# Patient Record
Sex: FEMALE | Race: WHITE | NOT HISPANIC OR LATINO | Employment: FULL TIME | URBAN - METROPOLITAN AREA
[De-identification: names, ages, dates, MRNs, and addresses within clinical notes are randomized per-mention and may not be internally consistent; named-entity substitution may affect disease eponyms.]

---

## 2017-10-25 ENCOUNTER — OFFICE VISIT (OUTPATIENT)
Dept: OBSTETRICS AND GYNECOLOGY | Facility: CLINIC | Age: 57
End: 2017-10-25

## 2017-10-25 VITALS
HEIGHT: 65 IN | BODY MASS INDEX: 35.82 KG/M2 | WEIGHT: 215 LBS | SYSTOLIC BLOOD PRESSURE: 122 MMHG | DIASTOLIC BLOOD PRESSURE: 80 MMHG

## 2017-10-25 DIAGNOSIS — Z01.419 WELL WOMAN EXAM WITH ROUTINE GYNECOLOGICAL EXAM: Primary | ICD-10-CM

## 2017-10-25 PROCEDURE — 99396 PREV VISIT EST AGE 40-64: CPT | Performed by: OBSTETRICS & GYNECOLOGY

## 2017-10-25 RX ORDER — CANAGLIFLOZIN 300 MG/1
TABLET, FILM COATED ORAL
Refills: 1 | COMMUNITY
Start: 2017-08-23

## 2017-10-25 RX ORDER — DULAGLUTIDE 0.75 MG/.5ML
INJECTION, SOLUTION SUBCUTANEOUS
Refills: 0 | COMMUNITY
Start: 2017-10-20 | End: 2022-01-05

## 2017-10-25 RX ORDER — METFORMIN HYDROCHLORIDE 500 MG/1
TABLET, EXTENDED RELEASE ORAL
Refills: 3 | COMMUNITY
Start: 2017-10-20

## 2017-10-25 RX ORDER — METOPROLOL SUCCINATE 25 MG/1
TABLET, EXTENDED RELEASE ORAL
Refills: 1 | COMMUNITY
Start: 2017-09-20

## 2017-10-25 RX ORDER — PRAVASTATIN SODIUM 40 MG
TABLET ORAL
Refills: 1 | COMMUNITY
Start: 2017-10-20

## 2017-10-25 RX ORDER — LORAZEPAM 1 MG/1
TABLET ORAL
Refills: 5 | COMMUNITY
Start: 2017-10-14 | End: 2022-01-05

## 2017-10-25 RX ORDER — ESOMEPRAZOLE MAGNESIUM 40 MG/1
CAPSULE, DELAYED RELEASE ORAL
Refills: 2 | COMMUNITY
Start: 2017-10-14

## 2017-10-25 RX ORDER — ROPINIROLE 1 MG/1
1 TABLET, FILM COATED ORAL NIGHTLY
COMMUNITY
End: 2022-01-05

## 2017-10-25 RX ORDER — VENLAFAXINE HYDROCHLORIDE 75 MG/1
CAPSULE, EXTENDED RELEASE ORAL
Refills: 1 | COMMUNITY
Start: 2017-10-14

## 2017-10-25 NOTE — PROGRESS NOTES
Subjective:    Patient Colleen Yanes is a 56 y.o. female.   Chief Complaint   Patient presents with   • Gynecologic Exam     MX TODAY. DX UP TO DATE. C-SCOPY DUE. NON SMOKER. NO HYST.      Patient's postmenopausal having no hormone symptoms here for her anal exam    HPI      The following portions of the patient's history were reviewed and updated as appropriate: allergies, current medications, past family history, past medical history, past social history, past surgical history and problem list.      Review of Systems   Constitutional: Negative.    HENT: Negative.    Eyes: Negative.    Respiratory: Negative.    Cardiovascular: Negative.    Gastrointestinal: Negative for abdominal distention, abdominal pain, anal bleeding, blood in stool, constipation, diarrhea, nausea, rectal pain and vomiting.   Endocrine: Negative for cold intolerance, heat intolerance, polydipsia, polyphagia and polyuria.   Genitourinary: Negative.  Negative for decreased urine volume, dyspareunia, dysuria, enuresis, flank pain, frequency, genital sores, hematuria, menstrual problem, pelvic pain, urgency, vaginal bleeding, vaginal discharge and vaginal pain.   Musculoskeletal: Negative.    Skin: Negative.    Allergic/Immunologic: Negative.    Neurological: Negative.    Hematological: Negative for adenopathy. Does not bruise/bleed easily.   Psychiatric/Behavioral: Negative for agitation, confusion and sleep disturbance. The patient is not nervous/anxious.          Objective:      Physical Exam   Constitutional: She appears well-developed and well-nourished. She is not intubated.   HENT:   Head: Hair is normal.   Nose: Nose normal.   Mouth/Throat: Oropharynx is clear and moist.   Eyes: Conjunctivae are normal.   Neck: Normal carotid pulses and no JVD present. No tracheal tenderness, no spinous process tenderness and no muscular tenderness present. Carotid bruit is not present. No rigidity. No edema, no erythema and normal range of motion present.  No thyroid mass and no thyromegaly present.   Cardiovascular: Normal rate, regular rhythm, S1 normal and normal heart sounds.  Exam reveals no gallop.    No murmur heard.  Pulmonary/Chest: Effort normal. No accessory muscle usage or stridor. No apnea, no tachypnea and no bradypnea. She is not intubated. No respiratory distress. She has no wheezes. She has no rales. She exhibits no tenderness. Right breast exhibits no inverted nipple, no mass, no nipple discharge, no skin change and no tenderness. Left breast exhibits no inverted nipple, no mass, no nipple discharge, no skin change and no tenderness.   Abdominal: Soft. Bowel sounds are normal. She exhibits no distension and no mass. There is no tenderness. There is no rebound and no guarding. No hernia.   Genitourinary: Vagina normal and uterus normal. Rectal exam shows no external hemorrhoid, no internal hemorrhoid, no fissure, no mass, no tenderness and anal tone normal. There is no rash, tenderness, lesion or injury on the right labia. There is no rash, tenderness, lesion or injury on the left labia. Uterus is not deviated, not enlarged, not fixed and not tender. Cervix exhibits no motion tenderness, no discharge and no friability. Right adnexum displays no mass and no tenderness. Left adnexum displays no mass and no tenderness. No erythema, tenderness or bleeding in the vagina. No foreign body in the vagina. No signs of injury around the vagina. No vaginal discharge found.   Musculoskeletal: She exhibits no edema or tenderness.        Right shoulder: She exhibits no tenderness, no swelling, no pain and no spasm.   Lymphadenopathy:        Head (right side): No submental, no submandibular, no tonsillar, no preauricular, no posterior auricular and no occipital adenopathy present.        Head (left side): No submental, no submandibular, no tonsillar, no preauricular, no posterior auricular and no occipital adenopathy present.     She has no cervical adenopathy.         Right cervical: No superficial cervical, no deep cervical and no posterior cervical adenopathy present.       Left cervical: No superficial cervical, no deep cervical and no posterior cervical adenopathy present.        Right axillary: No pectoral and no lateral adenopathy present.        Left axillary: No pectoral and no lateral adenopathy present.       Right: No inguinal, no supraclavicular and no epitrochlear adenopathy present.        Left: No inguinal, no supraclavicular and no epitrochlear adenopathy present.   Neurological: No cranial nerve deficit. Coordination normal.   Skin: Skin is warm and dry. No abrasion, no bruising, no burn, no lesion, no petechiae, no purpura and no rash noted. Rash is not macular, not maculopapular, not nodular and not urticarial. No cyanosis or erythema. No pallor. Nails show no clubbing.   Psychiatric: She has a normal mood and affect. Her behavior is normal.         Assessment and Plan: Patient's having no major issues she does have the adult onset diabetes and started on trulucidity    Patient has been instructed to perform a self breast exam on a weekly basis, a yearly mammogram, pap smear yearly unless instructed otherwise and bone density every 2 years.  I recommended that the patient not smoke, and discussed smoking cessation when appropriate.     Colleen was seen today for gynecologic exam.    Diagnoses and all orders for this visit:    Well woman exam with routine gynecological exam  -     IGP,rfx Aptima HPV All Pth - ThinPrep Vial, Cervix

## 2017-10-27 LAB
CONV .: NORMAL
CYTOLOGIST CVX/VAG CYTO: NORMAL
CYTOLOGY CVX/VAG DOC THIN PREP: NORMAL
DX ICD CODE: NORMAL
HIV 1 & 2 AB SER-IMP: NORMAL
OTHER STN SPEC: NORMAL
PATH REPORT.FINAL DX SPEC: NORMAL
STAT OF ADQ CVX/VAG CYTO-IMP: NORMAL

## 2018-02-26 ENCOUNTER — OFFICE (OUTPATIENT)
Dept: URBAN - METROPOLITAN AREA PATHOLOGY 4 | Facility: PATHOLOGY | Age: 58
End: 2018-02-26

## 2018-02-26 ENCOUNTER — AMBULATORY SURGICAL CENTER (OUTPATIENT)
Dept: URBAN - METROPOLITAN AREA SURGERY 17 | Facility: SURGERY | Age: 58
End: 2018-02-26

## 2018-02-26 VITALS
HEART RATE: 78 BPM | HEART RATE: 75 BPM | OXYGEN SATURATION: 97 % | HEART RATE: 86 BPM | OXYGEN SATURATION: 95 % | OXYGEN SATURATION: 100 % | OXYGEN SATURATION: 94 % | SYSTOLIC BLOOD PRESSURE: 142 MMHG | OXYGEN SATURATION: 91 % | DIASTOLIC BLOOD PRESSURE: 54 MMHG | SYSTOLIC BLOOD PRESSURE: 147 MMHG | OXYGEN SATURATION: 96 % | SYSTOLIC BLOOD PRESSURE: 120 MMHG | RESPIRATION RATE: 20 BRPM | SYSTOLIC BLOOD PRESSURE: 123 MMHG | DIASTOLIC BLOOD PRESSURE: 71 MMHG | DIASTOLIC BLOOD PRESSURE: 85 MMHG | RESPIRATION RATE: 21 BRPM | HEART RATE: 87 BPM | HEART RATE: 79 BPM | SYSTOLIC BLOOD PRESSURE: 146 MMHG | TEMPERATURE: 99 F | SYSTOLIC BLOOD PRESSURE: 104 MMHG | DIASTOLIC BLOOD PRESSURE: 84 MMHG | HEART RATE: 85 BPM | OXYGEN SATURATION: 93 % | OXYGEN SATURATION: 92 % | HEIGHT: 64 IN | SYSTOLIC BLOOD PRESSURE: 158 MMHG | WEIGHT: 208 LBS | HEART RATE: 104 BPM | DIASTOLIC BLOOD PRESSURE: 69 MMHG | DIASTOLIC BLOOD PRESSURE: 89 MMHG | SYSTOLIC BLOOD PRESSURE: 107 MMHG | DIASTOLIC BLOOD PRESSURE: 75 MMHG | DIASTOLIC BLOOD PRESSURE: 66 MMHG | HEART RATE: 76 BPM | SYSTOLIC BLOOD PRESSURE: 124 MMHG | RESPIRATION RATE: 14 BRPM | DIASTOLIC BLOOD PRESSURE: 53 MMHG | HEART RATE: 81 BPM | TEMPERATURE: 98.5 F | SYSTOLIC BLOOD PRESSURE: 131 MMHG | OXYGEN SATURATION: 98 % | DIASTOLIC BLOOD PRESSURE: 58 MMHG | DIASTOLIC BLOOD PRESSURE: 76 MMHG | HEART RATE: 101 BPM | OXYGEN SATURATION: 99 % | DIASTOLIC BLOOD PRESSURE: 73 MMHG | HEART RATE: 88 BPM | SYSTOLIC BLOOD PRESSURE: 117 MMHG | RESPIRATION RATE: 16 BRPM | SYSTOLIC BLOOD PRESSURE: 127 MMHG

## 2018-02-26 DIAGNOSIS — D12.5 BENIGN NEOPLASM OF SIGMOID COLON: ICD-10-CM

## 2018-02-26 DIAGNOSIS — Z12.11 ENCOUNTER FOR SCREENING FOR MALIGNANT NEOPLASM OF COLON: ICD-10-CM

## 2018-02-26 DIAGNOSIS — K63.5 POLYP OF COLON: ICD-10-CM

## 2018-02-26 DIAGNOSIS — K62.89 OTHER SPECIFIED DISEASES OF ANUS AND RECTUM: ICD-10-CM

## 2018-02-26 LAB
GI HISTOLOGY: A. UNSPECIFIED: (no result)
GI HISTOLOGY: PDF REPORT: (no result)

## 2018-02-26 PROCEDURE — 45380 COLONOSCOPY AND BIOPSY: CPT | Mod: 33 | Performed by: INTERNAL MEDICINE

## 2018-02-26 PROCEDURE — 88305 TISSUE EXAM BY PATHOLOGIST: CPT | Mod: 33 | Performed by: INTERNAL MEDICINE

## 2018-02-26 RX ORDER — LINACLOTIDE 145 UG/1
145 CAPSULE, GELATIN COATED ORAL
Qty: 30 | Refills: 11 | Status: COMPLETED
Start: 2018-02-26 | End: 2023-03-09

## 2018-02-26 RX ADMIN — PROPOFOL 50 MG: 10 INJECTION, EMULSION INTRAVENOUS at 12:38

## 2018-02-26 RX ADMIN — PROPOFOL 25 MG: 10 INJECTION, EMULSION INTRAVENOUS at 12:48

## 2018-02-26 RX ADMIN — PROPOFOL 50 MG: 10 INJECTION, EMULSION INTRAVENOUS at 12:51

## 2018-02-26 RX ADMIN — PROPOFOL 50 MG: 10 INJECTION, EMULSION INTRAVENOUS at 12:41

## 2018-02-26 RX ADMIN — PROPOFOL 50 MG: 10 INJECTION, EMULSION INTRAVENOUS at 12:40

## 2018-02-26 RX ADMIN — PROPOFOL 100 MG: 10 INJECTION, EMULSION INTRAVENOUS at 12:44

## 2018-02-26 RX ADMIN — PROPOFOL 25 MG: 10 INJECTION, EMULSION INTRAVENOUS at 12:52

## 2018-02-26 RX ADMIN — PROPOFOL 50 MG: 10 INJECTION, EMULSION INTRAVENOUS at 13:00

## 2018-02-26 RX ADMIN — PROPOFOL 50 MG: 10 INJECTION, EMULSION INTRAVENOUS at 12:56

## 2018-02-26 RX ADMIN — PROPOFOL 50 MG: 10 INJECTION, EMULSION INTRAVENOUS at 12:36

## 2018-02-26 RX ADMIN — PROPOFOL 50 MG: 10 INJECTION, EMULSION INTRAVENOUS at 13:10

## 2018-02-26 RX ADMIN — LIDOCAINE HYDROCHLORIDE 25 MG: 10 INJECTION, SOLUTION EPIDURAL; INFILTRATION; INTRACAUDAL; PERINEURAL at 12:35

## 2018-02-26 RX ADMIN — PROPOFOL 50 MG: 10 INJECTION, EMULSION INTRAVENOUS at 13:05

## 2018-02-26 RX ADMIN — PROPOFOL 100 MG: 10 INJECTION, EMULSION INTRAVENOUS at 12:35

## 2018-03-21 VITALS
DIASTOLIC BLOOD PRESSURE: 59 MMHG | RESPIRATION RATE: 16 BRPM | OXYGEN SATURATION: 94 % | SYSTOLIC BLOOD PRESSURE: 124 MMHG | RESPIRATION RATE: 19 BRPM | SYSTOLIC BLOOD PRESSURE: 129 MMHG | DIASTOLIC BLOOD PRESSURE: 69 MMHG | DIASTOLIC BLOOD PRESSURE: 80 MMHG | HEART RATE: 74 BPM | DIASTOLIC BLOOD PRESSURE: 76 MMHG | SYSTOLIC BLOOD PRESSURE: 116 MMHG | SYSTOLIC BLOOD PRESSURE: 136 MMHG | TEMPERATURE: 98.4 F | OXYGEN SATURATION: 91 % | DIASTOLIC BLOOD PRESSURE: 66 MMHG | OXYGEN SATURATION: 99 % | DIASTOLIC BLOOD PRESSURE: 90 MMHG | SYSTOLIC BLOOD PRESSURE: 128 MMHG | HEART RATE: 83 BPM | RESPIRATION RATE: 17 BRPM | RESPIRATION RATE: 18 BRPM | HEART RATE: 94 BPM | HEIGHT: 64 IN | HEART RATE: 80 BPM | SYSTOLIC BLOOD PRESSURE: 141 MMHG | OXYGEN SATURATION: 95 % | TEMPERATURE: 98.6 F | HEART RATE: 76 BPM | HEART RATE: 96 BPM | RESPIRATION RATE: 14 BRPM | SYSTOLIC BLOOD PRESSURE: 153 MMHG | WEIGHT: 208 LBS | DIASTOLIC BLOOD PRESSURE: 75 MMHG

## 2018-03-22 ENCOUNTER — OFFICE (OUTPATIENT)
Dept: URBAN - METROPOLITAN AREA PATHOLOGY 4 | Facility: PATHOLOGY | Age: 58
End: 2018-03-22

## 2018-03-22 ENCOUNTER — AMBULATORY SURGICAL CENTER (OUTPATIENT)
Dept: URBAN - METROPOLITAN AREA SURGERY 17 | Facility: SURGERY | Age: 58
End: 2018-03-22

## 2018-03-22 DIAGNOSIS — K31.7 POLYP OF STOMACH AND DUODENUM: ICD-10-CM

## 2018-03-22 DIAGNOSIS — K29.50 UNSPECIFIED CHRONIC GASTRITIS WITHOUT BLEEDING: ICD-10-CM

## 2018-03-22 DIAGNOSIS — K29.60 OTHER GASTRITIS WITHOUT BLEEDING: ICD-10-CM

## 2018-03-22 DIAGNOSIS — K21.9 GASTRO-ESOPHAGEAL REFLUX DISEASE WITHOUT ESOPHAGITIS: ICD-10-CM

## 2018-03-22 DIAGNOSIS — R13.10 DYSPHAGIA, UNSPECIFIED: ICD-10-CM

## 2018-03-22 DIAGNOSIS — K22.2 ESOPHAGEAL OBSTRUCTION: ICD-10-CM

## 2018-03-22 LAB
GI HISTOLOGY: A. UNSPECIFIED: (no result)
GI HISTOLOGY: PDF REPORT: (no result)

## 2018-03-22 PROCEDURE — 43239 EGD BIOPSY SINGLE/MULTIPLE: CPT | Performed by: INTERNAL MEDICINE

## 2018-03-22 PROCEDURE — 43450 DILATE ESOPHAGUS 1/MULT PASS: CPT | Performed by: INTERNAL MEDICINE

## 2018-03-22 PROCEDURE — 88305 TISSUE EXAM BY PATHOLOGIST: CPT | Performed by: INTERNAL MEDICINE

## 2018-03-22 RX ADMIN — PROPOFOL 100 MG: 10 INJECTION, EMULSION INTRAVENOUS at 11:34

## 2018-03-22 RX ADMIN — PROPOFOL 25 MG: 10 INJECTION, EMULSION INTRAVENOUS at 11:37

## 2018-03-22 RX ADMIN — PROPOFOL 75 MG: 10 INJECTION, EMULSION INTRAVENOUS at 11:37

## 2018-03-22 RX ADMIN — PROPOFOL 100 MG: 10 INJECTION, EMULSION INTRAVENOUS at 11:41

## 2019-01-22 DIAGNOSIS — Z12.31 VISIT FOR SCREENING MAMMOGRAM: Primary | ICD-10-CM

## 2019-01-23 ENCOUNTER — OFFICE VISIT (OUTPATIENT)
Dept: OBSTETRICS AND GYNECOLOGY | Facility: CLINIC | Age: 59
End: 2019-01-23

## 2019-01-23 VITALS
HEIGHT: 64 IN | SYSTOLIC BLOOD PRESSURE: 112 MMHG | DIASTOLIC BLOOD PRESSURE: 60 MMHG | BODY MASS INDEX: 34.15 KG/M2 | WEIGHT: 200 LBS

## 2019-01-23 DIAGNOSIS — Z01.419 ENCOUNTER FOR ANNUAL ROUTINE GYNECOLOGICAL EXAMINATION: Primary | ICD-10-CM

## 2019-01-23 PROCEDURE — 99396 PREV VISIT EST AGE 40-64: CPT | Performed by: OBSTETRICS & GYNECOLOGY

## 2019-01-23 RX ORDER — SEMAGLUTIDE 1.34 MG/ML
INJECTION, SOLUTION SUBCUTANEOUS
Refills: 3 | COMMUNITY
Start: 2018-12-02

## 2019-01-23 RX ORDER — PERPHENAZINE 16 MG
TABLET ORAL
COMMUNITY

## 2019-01-23 RX ORDER — LOSARTAN POTASSIUM 25 MG/1
25 TABLET ORAL DAILY
Refills: 1 | COMMUNITY
Start: 2018-12-05

## 2019-01-23 NOTE — PROGRESS NOTES
Subjective:    Patient Colleen Yanes is a 58 y.o. female.   Chief Complaint   Patient presents with   • Gynecologic Exam     AE, NO HYST, NON SMOKER     CC no issues annual exam yearly mammograms discussed patient had lichen sclerosis but the symptoms cleared completely    HPI      The following portions of the patient's history were reviewed and updated as appropriate: allergies, current medications, past family history, past medical history, past social history, past surgical history and problem list.      Review of Systems   Constitutional: Negative.    HENT: Negative.    Eyes: Negative.    Respiratory: Negative.    Cardiovascular: Negative.    Gastrointestinal: Negative for abdominal distention, abdominal pain, anal bleeding, blood in stool, constipation, diarrhea, nausea, rectal pain and vomiting.   Endocrine: Negative for cold intolerance, heat intolerance, polydipsia, polyphagia and polyuria.   Genitourinary: Negative.  Negative for decreased urine volume, dyspareunia, dysuria, enuresis, flank pain, frequency, genital sores, hematuria, menstrual problem, pelvic pain, urgency, vaginal bleeding, vaginal discharge and vaginal pain.   Musculoskeletal: Negative.    Skin: Negative.    Allergic/Immunologic: Negative.    Neurological: Negative.    Hematological: Negative for adenopathy. Does not bruise/bleed easily.   Psychiatric/Behavioral: Negative for agitation, confusion and sleep disturbance. The patient is not nervous/anxious.          Objective:      Physical Exam   Constitutional: She appears well-developed and well-nourished. She is not intubated.   HENT:   Head: Hair is normal.   Nose: Nose normal.   Mouth/Throat: Oropharynx is clear and moist.   Eyes: Conjunctivae are normal.   Neck: Normal carotid pulses and no JVD present. No tracheal tenderness, no spinous process tenderness and no muscular tenderness present. Carotid bruit is not present. No neck rigidity. No edema, no erythema and normal range of  motion present. No thyroid mass and no thyromegaly present.   Cardiovascular: Normal rate, regular rhythm, S1 normal and normal heart sounds. Exam reveals no gallop.   No murmur heard.  Pulmonary/Chest: Effort normal. No accessory muscle usage or stridor. No apnea, no tachypnea and no bradypnea. She is not intubated. No respiratory distress. She has no wheezes. She has no rales. She exhibits no tenderness. Right breast exhibits no inverted nipple, no mass, no nipple discharge, no skin change and no tenderness. Left breast exhibits no inverted nipple, no mass, no nipple discharge, no skin change and no tenderness.   Abdominal: Soft. Bowel sounds are normal. She exhibits no distension and no mass. There is no tenderness. There is no rebound and no guarding. No hernia.   Genitourinary: Vagina normal and uterus normal. Rectal exam shows no external hemorrhoid, no internal hemorrhoid, no fissure, no mass, no tenderness and anal tone normal. There is no rash, tenderness, lesion or injury on the right labia. There is no rash, tenderness, lesion or injury on the left labia. Uterus is not deviated, not enlarged, not fixed and not tender. Cervix exhibits no motion tenderness, no discharge and no friability. Right adnexum displays no mass and no tenderness. Left adnexum displays no mass and no tenderness. No erythema, tenderness or bleeding in the vagina. No foreign body in the vagina. No signs of injury around the vagina. No vaginal discharge found.   Musculoskeletal: She exhibits no edema or tenderness.        Right shoulder: She exhibits no tenderness, no swelling, no pain and no spasm.   Lymphadenopathy:        Head (right side): No submental, no submandibular, no tonsillar, no preauricular, no posterior auricular and no occipital adenopathy present.        Head (left side): No submental, no submandibular, no tonsillar, no preauricular, no posterior auricular and no occipital adenopathy present.     She has no cervical  adenopathy.        Right cervical: No superficial cervical, no deep cervical and no posterior cervical adenopathy present.       Left cervical: No superficial cervical, no deep cervical and no posterior cervical adenopathy present.        Right axillary: No pectoral and no lateral adenopathy present.        Left axillary: No pectoral and no lateral adenopathy present.       Right: No inguinal, no supraclavicular and no epitrochlear adenopathy present.        Left: No inguinal, no supraclavicular and no epitrochlear adenopathy present.   Neurological: No cranial nerve deficit. Coordination normal.   Skin: Skin is warm and dry. No abrasion, no bruising, no burn, no lesion, no petechiae, no purpura and no rash noted. Rash is not macular, not maculopapular, not nodular and not urticarial. No cyanosis or erythema. No pallor. Nails show no clubbing.   Psychiatric: She has a normal mood and affect. Her behavior is normal.         Assessment and Plan: Patient will get her mammogram and yearly mammograms recommended  lichen sclerosis has cleared but she had several lichen sclerosis spots on her upper torso and she can use the clobetasol    Patient has been instructed to perform a self breast exam on a weekly basis, a yearly mammogram, pap smear yearly unless instructed otherwise and bone density every 2 years.  I recommended that the patient not smoke, and discussed smoking cessation when appropriate.     Colleen was seen today for gynecologic exam.    Diagnoses and all orders for this visit:    Encounter for annual routine gynecological examination  -     Pap IG, Rfx HPV ASCU

## 2020-09-16 ENCOUNTER — OFFICE VISIT (OUTPATIENT)
Dept: OBSTETRICS AND GYNECOLOGY | Facility: CLINIC | Age: 60
End: 2020-09-16

## 2020-09-16 ENCOUNTER — APPOINTMENT (OUTPATIENT)
Dept: WOMENS IMAGING | Facility: HOSPITAL | Age: 60
End: 2020-09-16

## 2020-09-16 ENCOUNTER — PROCEDURE VISIT (OUTPATIENT)
Dept: OBSTETRICS AND GYNECOLOGY | Facility: CLINIC | Age: 60
End: 2020-09-16

## 2020-09-16 VITALS
WEIGHT: 199.8 LBS | DIASTOLIC BLOOD PRESSURE: 78 MMHG | HEIGHT: 64 IN | SYSTOLIC BLOOD PRESSURE: 138 MMHG | BODY MASS INDEX: 34.11 KG/M2

## 2020-09-16 DIAGNOSIS — Z01.419 WELL WOMAN EXAM WITH ROUTINE GYNECOLOGICAL EXAM: Primary | ICD-10-CM

## 2020-09-16 DIAGNOSIS — Z12.31 VISIT FOR SCREENING MAMMOGRAM: Primary | ICD-10-CM

## 2020-09-16 PROCEDURE — 77067 SCR MAMMO BI INCL CAD: CPT | Performed by: RADIOLOGY

## 2020-09-16 PROCEDURE — 77067 SCR MAMMO BI INCL CAD: CPT | Performed by: STUDENT IN AN ORGANIZED HEALTH CARE EDUCATION/TRAINING PROGRAM

## 2020-09-16 PROCEDURE — 99396 PREV VISIT EST AGE 40-64: CPT | Performed by: STUDENT IN AN ORGANIZED HEALTH CARE EDUCATION/TRAINING PROGRAM

## 2020-09-16 PROCEDURE — 77063 BREAST TOMOSYNTHESIS BI: CPT | Performed by: RADIOLOGY

## 2020-09-16 PROCEDURE — 77063 BREAST TOMOSYNTHESIS BI: CPT | Performed by: STUDENT IN AN ORGANIZED HEALTH CARE EDUCATION/TRAINING PROGRAM

## 2020-09-16 RX ORDER — PRAMIPEXOLE DIHYDROCHLORIDE 0.5 MG/1
0.5 TABLET ORAL DAILY
COMMUNITY

## 2020-09-16 NOTE — PROGRESS NOTES
GYN Annual Exam     CC- Here for annual exam.     Colleen Yanes is a 59 y.o. female who presents for annual well woman exam. Postmenopausal status. She has no complaints today and reports that she is doing well. She denies postmenopausal bleeding, vaginal discharge, vaginal dryness.     OB History        3    Para   3    Term   3            AB        Living           SAB        TAB        Ectopic        Molar        Multiple        Live Births                  Currently not sexually active with .   Current contraception: post menopausal status  History of abnormal Pap smear: Yes, reports testing positive for HPV and underwent colposcopy with normal biopsy for study.   Family history of uterine, colon or ovarian cancer: no  History of abnormal mammogram: no  Family history of breast cancer: yes - Mother at age 80  Last Pap : 29- NILM   Last mammogram: 10/25/17- B[facility]ADS 1   Last colonoscopy: Performed in 2018- polyp. Patient reports needing follow up in 3-5 years from colonoscopy.   Last DEXA: n/a  Parental Hip Fracture: denies    Past Medical History:   Diagnosis Date   • Depression    • Diabetes mellitus (CMS/HCC)    • GERD (gastroesophageal reflux disease)    • Heart palpitations    • Hyperlipidemia    • Restless leg        Past Surgical History:   Procedure Laterality Date   •  SECTION     • DILATATION AND CURETTAGE           Current Outpatient Medications:   •  Alpha-Lipoic Acid 600 MG capsule, Take  by mouth., Disp: , Rfl:   •  CALCIUM-MAG-VIT C-VIT D PO, Take  by mouth., Disp: , Rfl:   •  esomeprazole (nexIUM) 40 MG capsule, TK 1 C PO QD, Disp: , Rfl: 2  •  INVOKANA 300 MG tablet, TK 1 T PO QAM B NATE, Disp: , Rfl: 1  •  LORazepam (ATIVAN) 1 MG tablet, TK 1 T PO  Q 6 H PRF ANXIETY., Disp: , Rfl: 5  •  losartan (COZAAR) 25 MG tablet, Take 25 mg by mouth Daily., Disp: , Rfl: 1  •  metFORMIN ER (GLUCOPHAGE-XR) 500 MG 24 hr tablet, TK 2 TS PO BID WC, Disp: , Rfl: 3  •  metoprolol  succinate XL (TOPROL-XL) 25 MG 24 hr tablet, TK 1 T PO  QD, Disp: , Rfl: 1  •  OZEMPIC 1 MG/DOSE solution pen-injector, INJECT 1 MG INTO THE SKIN Q 7 DAYS, Disp: , Rfl: 3  •  pramipexole (MIRAPEX) 0.5 MG tablet, Take 0.5 mg by mouth Daily., Disp: , Rfl:   •  pravastatin (PRAVACHOL) 40 MG tablet, TK 1 T PO D, Disp: , Rfl: 1  •  venlafaxine XR (EFFEXOR-XR) 75 MG 24 hr capsule, TK 1 C PO D, Disp: , Rfl: 1  •  rOPINIRole (REQUIP) 1 MG tablet, Take 1 mg by mouth Every Night. Take 1 hour before bedtime., Disp: , Rfl:   •  TRULICITY 0.75 MG/0.5ML solution pen-injector, INJECT 0.5MLS INTO THE SKIN Q WEEK, Disp: , Rfl: 0    Allergies   Allergen Reactions   • Lisinopril Other (See Comments)     cough  cough  cough   • Simvastatin Other (See Comments)     myalgias  myalgias  myalgias   • Sulfa Antibiotics Itching and Rash   • Sulfasalazine Itching and Rash       Social History     Tobacco Use   • Smoking status: Former Smoker   • Smokeless tobacco: Never Used   • Tobacco comment: quit age 35   Substance Use Topics   • Alcohol use: Yes     Comment: occassional   • Drug use: Never       Family History   Problem Relation Age of Onset   • Breast cancer Mother        Review of Systems   Constitutional: Negative for chills and fever.   HENT: Negative for congestion and sore throat.    Eyes: Negative for photophobia and visual disturbance.   Respiratory: Negative for cough and shortness of breath.    Cardiovascular: Negative for chest pain and leg swelling.   Gastrointestinal: Negative for abdominal pain, constipation and diarrhea.   Endocrine: Negative for cold intolerance and heat intolerance.   Genitourinary: Negative for breast discharge, breast lump, breast pain, difficulty urinating, vaginal bleeding and vaginal discharge.   Musculoskeletal: Negative for arthralgias and myalgias.   Skin: Negative for rash and bruise.   Allergic/Immunologic: Negative for environmental allergies and food allergies.   Neurological: Negative for  "light-headedness and headache.   Hematological: Negative for adenopathy. Does not bruise/bleed easily.   Psychiatric/Behavioral: Negative for agitation. The patient is not nervous/anxious.        /78   Ht 162.6 cm (64\")   Wt 90.6 kg (199 lb 12.8 oz)   BMI 34.30 kg/m²     Physical Exam  Vitals signs reviewed.   Constitutional:       Appearance: Normal appearance.   HENT:      Head: Normocephalic and atraumatic.      Right Ear: External ear normal.      Left Ear: External ear normal.   Eyes:      Extraocular Movements: Extraocular movements intact.      Pupils: Pupils are equal, round, and reactive to light.   Neck:      Musculoskeletal: Normal range of motion and neck supple.   Cardiovascular:      Rate and Rhythm: Normal rate and regular rhythm.   Pulmonary:      Effort: Pulmonary effort is normal. No respiratory distress.   Chest:      Breasts: Breasts are symmetrical.         Right: Normal. No mass, nipple discharge, skin change or tenderness.         Left: Normal. No mass, nipple discharge, skin change or tenderness.   Abdominal:      General: There is no distension.      Palpations: Abdomen is soft. There is no mass.      Tenderness: There is no abdominal tenderness. There is no guarding or rebound.      Hernia: No hernia is present.   Genitourinary:     Comments: Normal external female genitalia. Normal vulva. Mild vaginal atrophy. Normal appearing, small cervix. Uterus anteverted, normal size, non-tender, mobile. No adnexal masses or tenderness.   Musculoskeletal: Normal range of motion.         General: No swelling.   Lymphadenopathy:      Upper Body:      Right upper body: No supraclavicular or axillary adenopathy.      Left upper body: No supraclavicular or axillary adenopathy.   Skin:     General: Skin is warm and dry.   Neurological:      General: No focal deficit present.      Mental Status: She is alert and oriented to person, place, and time.   Psychiatric:         Mood and Affect: Mood " normal.         Behavior: Behavior normal.       Assessment     1) GYN annual well woman exam.   2) Breast cancer screening      Plan     1) Breast Health - Clinical breast exam & mammogram yearly, Self breast awareness monthly. Patient underwent mammogram today.   2) Pap - Up to date. Patient needs repeat pap smear with cotesting in 2022.   3) Smoking status- non-smoker   4) Colon health - screening colonoscopy recommended if not up to date. Patient to contact PCP regarding follow up colonoscopy.   5) Bone health - Weight bearing exercise, dietary calcium recommendations and vitamin D reviewed.   6) Activity recommends - Adult 150-300 min/week of multi-component physical activities that include balance training, aerobic and physical strengthening.    Avoidance of distracted driving - texts/phone calls while driving.   7) Follow up prn and one year    Dorothy Dean MD  9/16/2020  14:17 EDT

## 2020-10-05 DIAGNOSIS — R92.8 ABNORMALITY OF LEFT BREAST ON SCREENING MAMMOGRAM: Primary | ICD-10-CM

## 2020-10-05 DIAGNOSIS — R92.1 CALCIFICATION OF LEFT BREAST ON MAMMOGRAPHY: ICD-10-CM

## 2020-10-06 ENCOUNTER — TELEPHONE (OUTPATIENT)
Dept: OBSTETRICS AND GYNECOLOGY | Facility: CLINIC | Age: 60
End: 2020-10-06

## 2020-10-06 NOTE — TELEPHONE ENCOUNTER
Called patient to discuss mammogram results. We reviewed mammogram that demonstrated calcifications needing further evaluation on the left breast with a diagnostic mammogram which is scheduled for 10/28/20. Right breast appeared normal. All questions and concerns answered.    Dorothy Dean MD  10/6/2020  17:06 EDT

## 2020-10-28 ENCOUNTER — APPOINTMENT (OUTPATIENT)
Dept: WOMENS IMAGING | Facility: HOSPITAL | Age: 60
End: 2020-10-28

## 2020-10-28 PROCEDURE — 77065 DX MAMMO INCL CAD UNI: CPT | Performed by: RADIOLOGY

## 2020-10-30 ENCOUNTER — TELEPHONE (OUTPATIENT)
Dept: OBSTETRICS AND GYNECOLOGY | Facility: CLINIC | Age: 60
End: 2020-10-30

## 2020-10-30 DIAGNOSIS — R92.8 ABNORMALITY OF LEFT BREAST ON SCREENING MAMMOGRAM: ICD-10-CM

## 2020-10-30 DIAGNOSIS — R92.1 CALCIFICATION OF LEFT BREAST ON MAMMOGRAPHY: ICD-10-CM

## 2020-10-30 DIAGNOSIS — R92.1 BREAST CALCIFICATION, LEFT: ICD-10-CM

## 2020-10-30 DIAGNOSIS — R92.8 ABNORMAL MAMMOGRAM OF LEFT BREAST: Primary | ICD-10-CM

## 2020-11-11 ENCOUNTER — APPOINTMENT (OUTPATIENT)
Dept: WOMENS IMAGING | Facility: HOSPITAL | Age: 60
End: 2020-11-11

## 2020-11-11 PROCEDURE — 19081 BX BREAST 1ST LESION STRTCTC: CPT | Performed by: RADIOLOGY

## 2020-11-13 ENCOUNTER — TELEPHONE (OUTPATIENT)
Dept: OBSTETRICS AND GYNECOLOGY | Facility: CLINIC | Age: 60
End: 2020-11-13

## 2020-11-13 DIAGNOSIS — R92.8 ABNORMAL MAMMOGRAM OF LEFT BREAST: ICD-10-CM

## 2020-11-13 DIAGNOSIS — R92.1 BREAST CALCIFICATION, LEFT: ICD-10-CM

## 2020-11-13 NOTE — TELEPHONE ENCOUNTER
Called patient regarding results of recent stereotactic left breast biopsy. Verified name and . Reviewed benign pathology with the patient and recommended repeat screening mammogram in 1 year. All questions and concerns answered.    Dorothy Dean MD  2020  13:06 EST

## 2021-03-19 ENCOUNTER — BULK ORDERING (OUTPATIENT)
Dept: CASE MANAGEMENT | Facility: OTHER | Age: 61
End: 2021-03-19

## 2021-03-19 DIAGNOSIS — Z23 IMMUNIZATION DUE: ICD-10-CM

## 2022-01-05 ENCOUNTER — OFFICE VISIT (OUTPATIENT)
Dept: OBSTETRICS AND GYNECOLOGY | Facility: CLINIC | Age: 62
End: 2022-01-05

## 2022-01-05 ENCOUNTER — APPOINTMENT (OUTPATIENT)
Dept: WOMENS IMAGING | Facility: HOSPITAL | Age: 62
End: 2022-01-05

## 2022-01-05 ENCOUNTER — PROCEDURE VISIT (OUTPATIENT)
Dept: OBSTETRICS AND GYNECOLOGY | Facility: CLINIC | Age: 62
End: 2022-01-05

## 2022-01-05 VITALS
WEIGHT: 195 LBS | DIASTOLIC BLOOD PRESSURE: 61 MMHG | HEIGHT: 64 IN | BODY MASS INDEX: 33.29 KG/M2 | SYSTOLIC BLOOD PRESSURE: 118 MMHG

## 2022-01-05 DIAGNOSIS — N90.4 LICHEN SCLEROSUS OF FEMALE GENITALIA: ICD-10-CM

## 2022-01-05 DIAGNOSIS — Z01.419 WELL WOMAN EXAM: Primary | ICD-10-CM

## 2022-01-05 DIAGNOSIS — Z12.31 VISIT FOR SCREENING MAMMOGRAM: Primary | ICD-10-CM

## 2022-01-05 DIAGNOSIS — Z12.31 BREAST CANCER SCREENING BY MAMMOGRAM: ICD-10-CM

## 2022-01-05 DIAGNOSIS — Z12.4 CERVICAL CANCER SCREENING: ICD-10-CM

## 2022-01-05 DIAGNOSIS — Z78.0 POSTMENOPAUSAL STATE: ICD-10-CM

## 2022-01-05 PROCEDURE — 77067 SCR MAMMO BI INCL CAD: CPT | Performed by: RADIOLOGY

## 2022-01-05 PROCEDURE — 77063 BREAST TOMOSYNTHESIS BI: CPT | Performed by: RADIOLOGY

## 2022-01-05 PROCEDURE — 77063 BREAST TOMOSYNTHESIS BI: CPT | Performed by: STUDENT IN AN ORGANIZED HEALTH CARE EDUCATION/TRAINING PROGRAM

## 2022-01-05 PROCEDURE — 99396 PREV VISIT EST AGE 40-64: CPT | Performed by: STUDENT IN AN ORGANIZED HEALTH CARE EDUCATION/TRAINING PROGRAM

## 2022-01-05 PROCEDURE — 99213 OFFICE O/P EST LOW 20 MIN: CPT | Performed by: STUDENT IN AN ORGANIZED HEALTH CARE EDUCATION/TRAINING PROGRAM

## 2022-01-05 PROCEDURE — 77067 SCR MAMMO BI INCL CAD: CPT | Performed by: STUDENT IN AN ORGANIZED HEALTH CARE EDUCATION/TRAINING PROGRAM

## 2022-01-05 RX ORDER — CLOBETASOL PROPIONATE 0.5 MG/G
1 OINTMENT TOPICAL 2 TIMES WEEKLY
Qty: 60 G | Refills: 2 | Status: SHIPPED | OUTPATIENT
Start: 2022-01-06

## 2022-01-05 NOTE — PROGRESS NOTES
GYN Annual Exam     CC- Here for annual exam.     Colleen Yanes is a 61 y.o. postmenopausal female who presents for annual well woman exam. She has no complaints today and reports that she is doing well. She denies postmenopausal vaginal bleeding, discharge.  Denies vasomotor symptoms. She has history of lichen sclerosus that she previously was applying a steroid to and has not caused too many issues over the last few years. It is now causing intermittent itching and dryness.     OB History        3    Para   3    Term   3            AB        Living           SAB        IAB        Ectopic        Molar        Multiple        Live Births                  She was on birth control pill until her mid 50s then switched to hormone patch but did not like how she felt.   Current contraception: post menopausal status  History of abnormal Pap smear: yes - reports positive testing for HPV and underwent colposcopy with benign biopsy in the past.   Family history of uterine, colon or ovarian cancer: no  History of abnormal mammogram: no  Family history of breast cancer: yes - Mother diagnosed at age 80.  Last Pap : 19- NILM   Last mammogram: 20- BIRADS 0--> 10/ stereotactic biopsy with placement of clip of left breast with benign biopsy  Last colonoscopy: - polyp--> repeat in 3-5 years.   Last DEXA: n/a  Parental Hip Fracture: denies     Past Medical History:   Diagnosis Date   • Depression    • Diabetes mellitus (HCC)    • GERD (gastroesophageal reflux disease)    • Heart palpitations    • Hyperlipidemia    • Restless leg        Past Surgical History:   Procedure Laterality Date   •  SECTION     • DILATATION AND CURETTAGE           Current Outpatient Medications:   •  Alpha-Lipoic Acid 600 MG capsule, Take  by mouth., Disp: , Rfl:   •  CALCIUM-MAG-VIT C-VIT D PO, Take  by mouth., Disp: , Rfl:   •  esomeprazole (nexIUM) 40 MG capsule, TK 1 C PO QD, Disp: , Rfl: 2  •  INVOKANA 300 MG tablet, TK  "1 T PO QAM B NATE, Disp: , Rfl: 1  •  losartan (COZAAR) 25 MG tablet, Take 25 mg by mouth Daily., Disp: , Rfl: 1  •  metFORMIN ER (GLUCOPHAGE-XR) 500 MG 24 hr tablet, TK 2 TS PO BID WC, Disp: , Rfl: 3  •  metoprolol succinate XL (TOPROL-XL) 25 MG 24 hr tablet, TK 1 T PO  QD, Disp: , Rfl: 1  •  OZEMPIC 1 MG/DOSE solution pen-injector, INJECT 1 MG INTO THE SKIN Q 7 DAYS, Disp: , Rfl: 3  •  pramipexole (MIRAPEX) 0.5 MG tablet, Take 0.5 mg by mouth Daily., Disp: , Rfl:   •  pravastatin (PRAVACHOL) 40 MG tablet, TK 1 T PO D, Disp: , Rfl: 1  •  venlafaxine XR (EFFEXOR-XR) 75 MG 24 hr capsule, TK 1 C PO D, Disp: , Rfl: 1    Allergies   Allergen Reactions   • Lisinopril Other (See Comments)     cough  cough  cough   • Simvastatin Other (See Comments)     myalgias  myalgias  myalgias   • Sulfa Antibiotics Itching and Rash   • Sulfasalazine Itching and Rash       Social History     Tobacco Use   • Smoking status: Former Smoker   • Smokeless tobacco: Never Used   • Tobacco comment: quit age 35   Substance Use Topics   • Alcohol use: Yes     Comment: occassional   • Drug use: Never       Family History   Problem Relation Age of Onset   • Breast cancer Mother        Review of Systems   All other systems reviewed and are negative.      /61   Ht 162.6 cm (64.02\")   Wt 88.5 kg (195 lb)   BMI 33.45 kg/m²     Physical Exam  Vitals reviewed. Exam conducted with a chaperone present.   Constitutional:       General: She is not in acute distress.  HENT:      Head: Normocephalic and atraumatic.      Right Ear: External ear normal.      Left Ear: External ear normal.   Eyes:      Extraocular Movements: Extraocular movements intact.      Pupils: Pupils are equal, round, and reactive to light.   Cardiovascular:      Rate and Rhythm: Normal rate and regular rhythm.   Pulmonary:      Effort: Pulmonary effort is normal. No respiratory distress.   Chest:   Breasts: Breasts are symmetrical.      Right: No swelling, bleeding, inverted " nipple, mass, nipple discharge, skin change or tenderness.      Left: Mass present. No swelling, bleeding, inverted nipple, nipple discharge, skin change or tenderness.        Comments: Bilateral fibrocystic dense breasts. Palpable mass in left breast at 6 o'clock approximately 1 cm size, where clip placed previously.   Abdominal:      General: There is no distension.      Palpations: Abdomen is soft.      Tenderness: There is no abdominal tenderness. There is no guarding or rebound.   Genitourinary:     Labia:         Right: No tenderness, lesion or injury.         Left: No tenderness, lesion or injury.       Urethra: No prolapse or urethral swelling.      Vagina: Vaginal discharge (white discharge in vaginal vault.) present.      Cervix: Normal.      Uterus: Normal. Not enlarged, not fixed and not tender.       Adnexa: Right adnexa normal and left adnexa normal.        Right: No mass, tenderness or fullness.          Left: No mass, tenderness or fullness.        Comments: Labial agglutination of labia minora to labia majora with tissue appearing thin and white.   Moderate vaginal atrophy.   Anterior vaginal wall with mild prolapse.   Musculoskeletal:         General: No deformity. Normal range of motion.      Cervical back: Normal range of motion and neck supple.   Lymphadenopathy:      Lower Body: No right inguinal adenopathy. No left inguinal adenopathy.   Skin:     General: Skin is warm and dry.   Neurological:      General: No focal deficit present.      Mental Status: She is alert and oriented to person, place, and time.   Psychiatric:         Mood and Affect: Mood normal.         Behavior: Behavior normal.         Assessment     1) GYN annual well woman exam.   2) Postmenopausal status   3) Cervical cancer screening  4) Breast cancer screening  5) Lichen sclerosus      Plan     1) Breast Health - Clinical breast exam & mammogram yearly, Self breast awareness monthly. Mammogram performed today.   2) Pap - Pap  smear with cotesting performed today.   3) Smoking status- non-smoker   4) Colon health - screening colonoscopy recommended if not up to date  5) Bone health - Weight bearing exercise, dietary calcium recommendations and vitamin D reviewed.   6) Activity recommends - Adult 150-300 min/week of multi-component physical activities that include balance training, aerobic and physical strengthening.    7) Lichen sclerosus- Patient exam consistent with lichen sclerosus and will prescribe clobetasol 0.05% ointment to be applied twice weekly for symptom management.   8) Follow up prn and one year      Dorothy Dean MD  1/5/2022  13:40 EST

## 2022-01-10 LAB
CYTOLOGIST CVX/VAG CYTO: NORMAL
CYTOLOGY CVX/VAG DOC CYTO: NORMAL
CYTOLOGY CVX/VAG DOC THIN PREP: NORMAL
DX ICD CODE: NORMAL
HIV 1 & 2 AB SER-IMP: NORMAL
HPV I/H RISK 4 DNA CVX QL PROBE+SIG AMP: NEGATIVE
OTHER STN SPEC: NORMAL
STAT OF ADQ CVX/VAG CYTO-IMP: NORMAL

## 2022-01-14 ENCOUNTER — TELEPHONE (OUTPATIENT)
Dept: OBSTETRICS AND GYNECOLOGY | Facility: CLINIC | Age: 62
End: 2022-01-14

## 2023-04-26 ENCOUNTER — OFFICE (OUTPATIENT)
Dept: URBAN - METROPOLITAN AREA CLINIC 76 | Facility: CLINIC | Age: 63
End: 2023-04-26

## 2023-04-26 VITALS
DIASTOLIC BLOOD PRESSURE: 67 MMHG | WEIGHT: 198 LBS | HEIGHT: 64 IN | OXYGEN SATURATION: 98 % | HEART RATE: 74 BPM | SYSTOLIC BLOOD PRESSURE: 120 MMHG

## 2023-04-26 DIAGNOSIS — K59.00 CONSTIPATION, UNSPECIFIED: ICD-10-CM

## 2023-04-26 DIAGNOSIS — Z86.010 PERSONAL HISTORY OF COLONIC POLYPS: ICD-10-CM

## 2023-04-26 DIAGNOSIS — K76.0 FATTY (CHANGE OF) LIVER, NOT ELSEWHERE CLASSIFIED: ICD-10-CM

## 2023-04-26 DIAGNOSIS — K21.9 GASTRO-ESOPHAGEAL REFLUX DISEASE WITHOUT ESOPHAGITIS: ICD-10-CM

## 2023-04-26 DIAGNOSIS — Z12.11 ENCOUNTER FOR SCREENING FOR MALIGNANT NEOPLASM OF COLON: ICD-10-CM

## 2023-04-26 DIAGNOSIS — R13.10 DYSPHAGIA, UNSPECIFIED: ICD-10-CM

## 2023-04-26 PROBLEM — K29.70 GASTRITIS, UNSPECIFIED, WITHOUT BLEEDING: Status: ACTIVE | Noted: 2018-03-22

## 2023-04-26 PROBLEM — K62.89 OTHER SPECIFIED DISEASES OF ANUS AND RECTUM: Status: ACTIVE | Noted: 2018-02-26

## 2023-04-26 PROBLEM — K22.2 ESOPHAGEAL OBSTRUCTION: Status: ACTIVE | Noted: 2018-03-22

## 2023-04-26 PROBLEM — K31.89 OTHER DISEASES OF STOMACH AND DUODENUM: Status: ACTIVE | Noted: 2018-03-22

## 2023-04-26 PROBLEM — D12.5 BENIGN NEOPLASM OF SIGMOID COLON: Status: ACTIVE | Noted: 2018-02-26

## 2023-04-26 PROBLEM — K31.7 POLYP OF STOMACH AND DUODENUM: Status: ACTIVE | Noted: 2018-03-22

## 2023-04-26 PROCEDURE — 99204 OFFICE O/P NEW MOD 45 MIN: CPT | Performed by: NURSE PRACTITIONER

## 2024-03-06 ENCOUNTER — OFFICE VISIT (OUTPATIENT)
Dept: OBSTETRICS AND GYNECOLOGY | Facility: CLINIC | Age: 64
End: 2024-03-06
Payer: COMMERCIAL

## 2024-03-06 ENCOUNTER — PROCEDURE VISIT (OUTPATIENT)
Dept: OBSTETRICS AND GYNECOLOGY | Facility: CLINIC | Age: 64
End: 2024-03-06
Payer: COMMERCIAL

## 2024-03-06 VITALS
SYSTOLIC BLOOD PRESSURE: 107 MMHG | DIASTOLIC BLOOD PRESSURE: 62 MMHG | HEIGHT: 64 IN | BODY MASS INDEX: 30.56 KG/M2 | WEIGHT: 179 LBS

## 2024-03-06 DIAGNOSIS — N90.4 LICHEN SCLEROSUS OF FEMALE GENITALIA: ICD-10-CM

## 2024-03-06 DIAGNOSIS — Z12.4 CERVICAL CANCER SCREENING: ICD-10-CM

## 2024-03-06 DIAGNOSIS — Z12.31 BREAST CANCER SCREENING BY MAMMOGRAM: ICD-10-CM

## 2024-03-06 DIAGNOSIS — Z78.0 POSTMENOPAUSAL STATUS: ICD-10-CM

## 2024-03-06 DIAGNOSIS — Z12.31 VISIT FOR SCREENING MAMMOGRAM: Primary | ICD-10-CM

## 2024-03-06 DIAGNOSIS — Z01.419 WELL WOMAN EXAM WITH ROUTINE GYNECOLOGICAL EXAM: Primary | ICD-10-CM

## 2024-03-06 PROCEDURE — 77067 SCR MAMMO BI INCL CAD: CPT | Performed by: STUDENT IN AN ORGANIZED HEALTH CARE EDUCATION/TRAINING PROGRAM

## 2024-03-06 PROCEDURE — 77063 BREAST TOMOSYNTHESIS BI: CPT | Performed by: STUDENT IN AN ORGANIZED HEALTH CARE EDUCATION/TRAINING PROGRAM

## 2024-03-06 NOTE — PROGRESS NOTES
GYN Annual Exam     CC- Here for annual exam.     Colleen Yanes is a 63 y.o. female who presents for annual well woman exam. She has no complaints today and reports that she is doing well. She denies postmenopausal vaginal bleeding, discharge.  Denies vasomotor symptoms. She has history of lichen sclerosus and reports flares up every now and then which she uses Clobetasol cream and A&D ointment.     OB History          3    Para   3    Term   3            AB        Living             SAB        IAB        Ectopic        Molar        Multiple        Live Births                  She was on birth control pill until her mid 50s then switched to hormone patch but did not like how she felt.    Current contraception: post menopausal status  History of abnormal Pap smear: yes - reports positive testing for HPV  and underwent colposcopy with benign biopsy in the past.   Family history of uterine, colon or ovarian cancer: no  History of abnormal mammogram: yes- 2020- left stereotactic biopsy for BIRADS-4A- benign predominately fatty breast tissue showing lobular and stromal microcalcifications   Family history of breast cancer: yes - Mother diagnosed at age 80.   Last Pap : 22- NILM, negative HPV testing   Last Completed Pap Smear            PAP SMEAR (Every 3 Years) Next due on 2022  IGP, Apt HPV,rfx 16 / 18,45    2019  Pap IG, Rfx HPV ASCU    10/25/2017  IGP,rfx Aptima HPV All Pth - ThinPrep Vial, Cervix                   Last mammogram: 22- BIRADS-2   Last Completed Mammogram       This patient has no relevant Health Maintenance data.           Last colonoscopy:  3-4 years ago- polyp benign- followed by her GI   Last Completed Colonoscopy       This patient has no relevant Health Maintenance data.           Last DEXA: reports normal a long time ago; Frax score 7.4%, 0.7%  Parental Hip Fracture: denies     Past Medical History:   Diagnosis Date    Depression     Diabetes  mellitus     GERD (gastroesophageal reflux disease)     Heart palpitations     Hyperlipidemia     Restless leg        Past Surgical History:   Procedure Laterality Date     SECTION      DILATATION AND CURETTAGE           Current Outpatient Medications:     clobetasol (TEMOVATE) 0.05 % ointment, Apply 1 application topically to the appropriate area as directed 2 (Two) Times a Week., Disp: 60 g, Rfl: 2    losartan (COZAAR) 25 MG tablet, Take 1 tablet by mouth Daily., Disp: , Rfl: 1    metFORMIN ER (GLUCOPHAGE-XR) 500 MG 24 hr tablet, TK 2 TS PO BID WC, Disp: , Rfl: 3    metoprolol succinate XL (TOPROL-XL) 25 MG 24 hr tablet, TK 1 T PO  QD, Disp: , Rfl: 1    pramipexole (MIRAPEX) 0.5 MG tablet, Take 1 tablet by mouth Daily., Disp: , Rfl:     pravastatin (PRAVACHOL) 40 MG tablet, TK 1 T PO D, Disp: , Rfl: 1    Alpha-Lipoic Acid 600 MG capsule, Take  by mouth. (Patient not taking: Reported on 3/6/2024), Disp: , Rfl:     CALCIUM-MAG-VIT C-VIT D PO, Take  by mouth. (Patient not taking: Reported on 3/6/2024), Disp: , Rfl:     esomeprazole (nexIUM) 40 MG capsule, TK 1 C PO QD, Disp: , Rfl: 2    INVOKANA 300 MG tablet, TK 1 T PO QAM B NATE, Disp: , Rfl: 1    OZEMPIC 1 MG/DOSE solution pen-injector, INJECT 1 MG INTO THE SKIN Q 7 DAYS (Patient not taking: Reported on 3/6/2024), Disp: , Rfl: 3    venlafaxine XR (EFFEXOR-XR) 75 MG 24 hr capsule, TK 1 C PO D, Disp: , Rfl: 1    Allergies   Allergen Reactions    Lisinopril Other (See Comments)     cough  cough  cough    Simvastatin Other (See Comments)     myalgias  myalgias  myalgias    Sulfa Antibiotics Itching and Rash    Sulfasalazine Itching and Rash       Social History     Tobacco Use    Smoking status: Former    Smokeless tobacco: Never    Tobacco comments:     quit age 35   Substance Use Topics    Alcohol use: Yes     Comment: occassional    Drug use: Never       Family History   Problem Relation Age of Onset    Breast cancer Mother     Ovarian cancer Neg Hx      "Uterine cancer Neg Hx     Colon cancer Neg Hx        Review of Systems   All other systems reviewed and are negative.      /62   Ht 162.6 cm (64.02\")   Wt 81.2 kg (179 lb)   BMI 30.71 kg/m²     Physical Exam  Vitals reviewed. Exam conducted with a chaperone present.   Constitutional:       General: She is not in acute distress.  HENT:      Head: Normocephalic and atraumatic.      Right Ear: External ear normal.      Left Ear: External ear normal.   Eyes:      Extraocular Movements: Extraocular movements intact.      Pupils: Pupils are equal, round, and reactive to light.   Cardiovascular:      Rate and Rhythm: Normal rate.   Pulmonary:      Effort: Pulmonary effort is normal. No respiratory distress.   Chest:   Breasts:     Right: No swelling, bleeding, inverted nipple, mass, nipple discharge, skin change or tenderness.      Left: No swelling, bleeding, inverted nipple, mass, nipple discharge, skin change or tenderness.   Abdominal:      General: There is no distension.      Palpations: Abdomen is soft.      Tenderness: There is no abdominal tenderness. There is no guarding or rebound.   Genitourinary:     General: Normal vulva.      Exam position: Lithotomy position.      Labia:         Right: No rash, tenderness, lesion or injury.         Left: No rash, tenderness, lesion or injury.       Urethra: No prolapse or urethral swelling.      Vagina: No vaginal discharge, erythema, tenderness, bleeding or lesions.      Cervix: Normal.      Uterus: Not enlarged, not fixed and not tender.       Adnexa:         Right: No mass, tenderness or fullness.          Left: No mass, tenderness or fullness.        Comments: Labial agglutination of the labia minora.   Moderate vulvovaginal atrophy.   Mild anterior wall vaginal prolapse.   Musculoskeletal:         General: No deformity. Normal range of motion.      Cervical back: Normal range of motion and neck supple.   Lymphadenopathy:      Upper Body:      Right upper body: " No supraclavicular or axillary adenopathy.      Left upper body: No supraclavicular or axillary adenopathy.      Lower Body: No right inguinal adenopathy. No left inguinal adenopathy.   Skin:     General: Skin is warm and dry.   Neurological:      General: No focal deficit present.      Mental Status: She is alert and oriented to person, place, and time.   Psychiatric:         Mood and Affect: Mood normal.         Behavior: Behavior normal.       Assessment     1) GYN annual well woman exam.   2) Postmenopausal status   3) Cervical cancer screening   4) Breast cancer screening   5) Lichen sclerosus      Plan     1) Breast Health - Clinical breast exam & mammogram yearly, Self breast awareness monthly. Mammogram performed today for breast cancer screening.   2) Pap - collected pap smear for cervical cancer screening.   3) Smoking status- former smoker.   4) Colon health - screening colonoscopy recommended if not up to date  5) Bone health - Weight bearing exercise, dietary calcium recommendations and vitamin D reviewed. Reviewed Frax score today and early DEXA is not indicated at this time. Will plan to starting osteoporosis screening at 65.   6) Activity recommends - Adult 150-300 min/week of multi-component physical activities that include balance training, aerobic and physical strengthening.    7) Lichen sclerosus- Patient is doing well with Clobetasol cream and A&D ointment to manage flare ups. Clinical exam consistent with lichen sclerosus today.   8) Follow up prn and one year      Dorothy Dean MD  3/6/2024  10:27 EST

## 2024-03-10 LAB
CONV .: NORMAL
CYTOLOGIST CVX/VAG CYTO: NORMAL
CYTOLOGY CVX/VAG DOC CYTO: NORMAL
CYTOLOGY CVX/VAG DOC THIN PREP: NORMAL
DX ICD CODE: NORMAL
Lab: NORMAL
OTHER STN SPEC: NORMAL
STAT OF ADQ CVX/VAG CYTO-IMP: NORMAL

## 2024-03-12 DIAGNOSIS — N64.89 BREAST ASYMMETRY: ICD-10-CM

## 2024-03-12 DIAGNOSIS — R92.8 ABNORMAL MAMMOGRAM: Primary | ICD-10-CM

## 2024-03-13 ENCOUNTER — TELEPHONE (OUTPATIENT)
Dept: OBSTETRICS AND GYNECOLOGY | Facility: CLINIC | Age: 64
End: 2024-03-13
Payer: COMMERCIAL

## 2024-03-13 NOTE — TELEPHONE ENCOUNTER
LM for pt to call about mammogram results and appt scheduled.    [facility] 3/19/24 @ 2 & 230pm for DX Left Mammo & Left Limited US suggested from her screening mammogram.

## 2024-03-13 NOTE — TELEPHONE ENCOUNTER
Pt returned my call.  We went over results and we changed her appt at [facility] to 3/20/24 @ 230 & 3pm.

## 2024-03-13 NOTE — TELEPHONE ENCOUNTER
----- Message from Trudi Duran RN sent at 3/12/2024  8:42 AM EDT -----  VM left for pt to call for imaging results, order placed for L dx mammo/US

## 2024-03-20 ENCOUNTER — APPOINTMENT (OUTPATIENT)
Dept: WOMENS IMAGING | Facility: HOSPITAL | Age: 64
End: 2024-03-20
Payer: COMMERCIAL

## 2024-03-20 PROCEDURE — 77061 BREAST TOMOSYNTHESIS UNI: CPT | Performed by: RADIOLOGY

## 2024-03-20 PROCEDURE — 77065 DX MAMMO INCL CAD UNI: CPT | Performed by: RADIOLOGY

## 2024-03-20 PROCEDURE — G0279 TOMOSYNTHESIS, MAMMO: HCPCS | Performed by: RADIOLOGY

## 2024-03-20 PROCEDURE — 76642 ULTRASOUND BREAST LIMITED: CPT | Performed by: RADIOLOGY

## 2024-03-21 ENCOUNTER — TELEPHONE (OUTPATIENT)
Dept: OBSTETRICS AND GYNECOLOGY | Facility: CLINIC | Age: 64
End: 2024-03-21
Payer: COMMERCIAL

## 2024-03-21 DIAGNOSIS — R92.8 ABNORMAL MAMMOGRAM: ICD-10-CM

## 2024-03-21 DIAGNOSIS — N64.89 BREAST ASYMMETRY: ICD-10-CM

## 2024-03-21 DIAGNOSIS — R92.8 ABNORMAL MAMMOGRAM: Primary | ICD-10-CM

## 2024-03-21 DIAGNOSIS — N63.20 MASS OF LEFT BREAST, UNSPECIFIED QUADRANT: Primary | ICD-10-CM

## 2024-03-21 RX ORDER — LIDOCAINE HYDROCHLORIDE AND EPINEPHRINE 10; 10 MG/ML; UG/ML
20 INJECTION, SOLUTION INFILTRATION; PERINEURAL ONCE
OUTPATIENT
Start: 2024-03-21 | End: 2024-03-21

## 2024-03-21 NOTE — TELEPHONE ENCOUNTER
Called patient and reviewed results of mammogram and ultrasound of left breast with BIRADS-4C results. Discussed recommended biopsies and the patient is already scheduled tomorrow with [facility]. She indicated understanding and all questions answered.     Dorothy Dean MD

## 2024-03-22 ENCOUNTER — LAB REQUISITION (OUTPATIENT)
Dept: LAB | Facility: HOSPITAL | Age: 64
End: 2024-03-22
Payer: COMMERCIAL

## 2024-03-22 ENCOUNTER — APPOINTMENT (OUTPATIENT)
Dept: WOMENS IMAGING | Facility: HOSPITAL | Age: 64
End: 2024-03-22
Payer: COMMERCIAL

## 2024-03-22 DIAGNOSIS — N63.0 UNSPECIFIED LUMP IN UNSPECIFIED BREAST: ICD-10-CM

## 2024-03-22 DIAGNOSIS — N63.20 MASS OF LEFT BREAST, UNSPECIFIED QUADRANT: Primary | ICD-10-CM

## 2024-03-22 PROCEDURE — 88360 TUMOR IMMUNOHISTOCHEM/MANUAL: CPT | Performed by: STUDENT IN AN ORGANIZED HEALTH CARE EDUCATION/TRAINING PROGRAM

## 2024-03-22 PROCEDURE — A4648 IMPLANTABLE TISSUE MARKER: HCPCS | Performed by: RADIOLOGY

## 2024-03-22 PROCEDURE — 19083 BX BREAST 1ST LESION US IMAG: CPT | Performed by: RADIOLOGY

## 2024-03-22 PROCEDURE — 88341 IMHCHEM/IMCYTCHM EA ADD ANTB: CPT | Performed by: STUDENT IN AN ORGANIZED HEALTH CARE EDUCATION/TRAINING PROGRAM

## 2024-03-22 PROCEDURE — 88342 IMHCHEM/IMCYTCHM 1ST ANTB: CPT | Performed by: STUDENT IN AN ORGANIZED HEALTH CARE EDUCATION/TRAINING PROGRAM

## 2024-03-22 PROCEDURE — 88305 TISSUE EXAM BY PATHOLOGIST: CPT | Performed by: STUDENT IN AN ORGANIZED HEALTH CARE EDUCATION/TRAINING PROGRAM

## 2024-03-22 PROCEDURE — 19000 PUNCTURE ASPIR CYST BREAST: CPT | Performed by: RADIOLOGY

## 2024-03-22 PROCEDURE — 19084 BX BREAST ADD LESION US IMAG: CPT | Performed by: RADIOLOGY

## 2024-03-26 ENCOUNTER — TELEPHONE (OUTPATIENT)
Dept: OBSTETRICS AND GYNECOLOGY | Facility: CLINIC | Age: 64
End: 2024-03-26
Payer: COMMERCIAL

## 2024-03-26 DIAGNOSIS — C50.919 INVASIVE LOBULAR CARCINOMA OF BREAST IN FEMALE: ICD-10-CM

## 2024-03-26 DIAGNOSIS — C50.912 INVASIVE DUCTAL CARCINOMA OF BREAST, LEFT: Primary | ICD-10-CM

## 2024-03-26 NOTE — TELEPHONE ENCOUNTER
Called the patient and reviewed results of breast biopsy demonstrated invasive ductal carcinoma and invasive lobular carcinoma on two different biopsies sites. Discussed next steps and getting her referred to breast surgery. Referral has been placed and will work on getting her scheduled ASAP. All questions answered.    Dorothy Dean MD

## 2024-03-27 ENCOUNTER — TELEPHONE (OUTPATIENT)
Dept: SURGERY | Facility: CLINIC | Age: 64
End: 2024-03-27
Payer: COMMERCIAL

## 2024-03-27 ENCOUNTER — HOSPITAL ENCOUNTER (OUTPATIENT)
Dept: MRI IMAGING | Facility: HOSPITAL | Age: 64
Discharge: HOME | End: 2024-03-27
Admitting: STUDENT IN AN ORGANIZED HEALTH CARE EDUCATION/TRAINING PROGRAM
Payer: COMMERCIAL

## 2024-03-27 DIAGNOSIS — C50.812 MALIGNANT NEOPLASM OF OVERLAPPING SITES OF LEFT BREAST IN FEMALE, ESTROGEN RECEPTOR POSITIVE: ICD-10-CM

## 2024-03-27 DIAGNOSIS — Z17.0 MALIGNANT NEOPLASM OF OVERLAPPING SITES OF LEFT BREAST IN FEMALE, ESTROGEN RECEPTOR POSITIVE: ICD-10-CM

## 2024-03-27 LAB
DX PRELIMINARY: NORMAL
LAB AP CASE REPORT: NORMAL
LAB AP CLINICAL INFORMATION: NORMAL
LAB AP DIAGNOSIS COMMENT: NORMAL
LAB AP SPECIAL STAINS: NORMAL
LAB AP SYNOPTIC CHECKLIST: NORMAL
PATH REPORT.FINAL DX SPEC: NORMAL
PATH REPORT.GROSS SPEC: NORMAL

## 2024-03-27 PROCEDURE — 77049 MRI BREAST C-+ W/CAD BI: CPT

## 2024-03-27 PROCEDURE — 0 GADOBENATE DIMEGLUMINE 529 MG/ML SOLUTION: Performed by: STUDENT IN AN ORGANIZED HEALTH CARE EDUCATION/TRAINING PROGRAM

## 2024-03-27 PROCEDURE — 82565 ASSAY OF CREATININE: CPT

## 2024-03-27 PROCEDURE — A9577 INJ MULTIHANCE: HCPCS | Performed by: STUDENT IN AN ORGANIZED HEALTH CARE EDUCATION/TRAINING PROGRAM

## 2024-03-27 RX ADMIN — GADOBENATE DIMEGLUMINE 16 ML: 529 INJECTION, SOLUTION INTRAVENOUS at 17:57

## 2024-03-27 NOTE — TELEPHONE ENCOUNTER
Scheduled with Dr Riddhi Amezcua, confirmed with patient, images requested, new patient packet mailed.    71-year-old male with significant past medical history for renal insufficiency, CHF, COPD, diabetes, prostate CA presents to the ED from [place] for AMS during episode of hypoglycemia.  In the ED patient is ANO x3 and asymptomatic.  We will repeat labs and monitor for hypoglycemia.

## 2024-03-27 NOTE — TELEPHONE ENCOUNTER
Eduardo, I do not place those referrals.    Pt has been scheduled to see Dr Amezcua on 4/10/24 @ 1245pm.

## 2024-03-27 NOTE — PROGRESS NOTES
[facility]     Referring Provider: Dorothy Dean MD     Chief complaint: newly diagnosed breast cancer    Subjective    HPI: Ms. Colleen Yanes is a 63 y.o. yo woman, seen at the request of Dr. Dean for a new diagnosis of left breast cancer.      This was initially detected as an imaging abnormality. Her work-up is detailed in the oncologic history below.   She denies any breast lumps, pain, skin changes, or nipple discharge.    She previously underwent 1 prior breast biopsy in 2020, left breast - reportedly benign pathology. No changes to her over all health. Since this diagnosis has been extreemly anxious, her PCP has added ativan to help with acute stress.    She reports she has a pertinent PMH of DM not on insulin, managed with mounjaro (A1c in January was 9) and metformin, depression, HTN, HLD, GERD.     She was joined today in clinic by her . They all participated in the discussion, after her examination, and she gave her consent for them to participate.  She gave consent for her  to be present during her examination and participate in the discussion.          Oncology/Hematology History    No history exists.       Review of Systems   Constitutional:  Negative for appetite change, fatigue and fever.   Respiratory:  Negative for cough and shortness of breath.    Gastrointestinal:  Positive for constipation. Negative for abdominal distention, diarrhea and nausea.   Musculoskeletal:  Negative for arthralgias and back pain.   Neurological:  Negative for dizziness, headaches and speech difficulty.   Psychiatric/Behavioral:  Positive for depression and sleep disturbance. Negative for decreased concentration.        Medications:    Current Outpatient Medications:     clobetasol (TEMOVATE) 0.05 % ointment, Apply 1 application topically to the appropriate area as directed 2 (Two) Times a Week., Disp: 60 g, Rfl: 2    esomeprazole (nexIUM) 40 MG capsule, TK 1 C PO QD, Disp: , Rfl: 2     fluorouracil (EFUDEX) 5 % cream, , Disp: , Rfl:     hydrOXYzine (ATARAX) 25 MG tablet, Take 1 tablet by mouth 3 (Three) Times a Day As Needed for Anxiety., Disp: 30 tablet, Rfl: 0    INVOKANA 300 MG tablet, TK 1 T PO QAM B NATE, Disp: , Rfl: 1    Jardiance 25 MG tablet tablet, Take 1 tablet by mouth Daily., Disp: , Rfl:     LORazepam (ATIVAN) 0.5 MG tablet, Take 1 tablet by mouth Every 8 (Eight) Hours As Needed for Anxiety., Disp: , Rfl:     losartan (COZAAR) 25 MG tablet, Take 1 tablet by mouth Daily., Disp: , Rfl: 1    metFORMIN ER (GLUCOPHAGE-XR) 500 MG 24 hr tablet, TK 2 TS PO BID WC, Disp: , Rfl: 3    metoprolol succinate XL (TOPROL-XL) 25 MG 24 hr tablet, TK 1 T PO  QD, Disp: , Rfl: 1    pramipexole (MIRAPEX) 0.5 MG tablet, Take 1 tablet by mouth Daily., Disp: , Rfl:     pravastatin (PRAVACHOL) 40 MG tablet, TK 1 T PO D, Disp: , Rfl: 1    rosuvastatin (CRESTOR) 40 MG tablet, Take 1 tablet by mouth Daily., Disp: , Rfl:     Tirzepatide (Mounjaro) 12.5 MG/0.5ML solution pen-injector pen, Inject 0.5 mL under the skin into the appropriate area as directed 1 (One) Time Per Week., Disp: , Rfl:     venlafaxine XR (EFFEXOR-XR) 75 MG 24 hr capsule, TK 1 C PO D, Disp: , Rfl: 1    vitamin D3 (vitamin d) 125 MCG (5000 UT) capsule capsule, Take  by mouth., Disp: , Rfl:     Alpha-Lipoic Acid 600 MG capsule, Take  by mouth. (Patient not taking: Reported on 3/6/2024), Disp: , Rfl:     CALCIUM-MAG-VIT C-VIT D PO, Take  by mouth. (Patient not taking: Reported on 3/6/2024), Disp: , Rfl:     OZEMPIC 1 MG/DOSE solution pen-injector, INJECT 1 MG INTO THE SKIN Q 7 DAYS (Patient not taking: Reported on 3/6/2024), Disp: , Rfl: 3    Allergies:  Allergies   Allergen Reactions    Lisinopril Other (See Comments)     cough  cough  cough    Simvastatin Other (See Comments)     myalgias  myalgias  myalgias    Sulfa Antibiotics Itching and Rash    Sulfasalazine Itching and Rash       Medical history:  Past Medical History:   Diagnosis Date     Depression     Diabetes mellitus     GERD (gastroesophageal reflux disease)     Heart palpitations     Hyperlipidemia     Restless leg        Surgical History:  Past Surgical History:   Procedure Laterality Date    BREAST BIOPSY Left      AND     BREAST CYST ASPIRATION Left      SECTION      DILATATION AND CURETTAGE      US GUIDED CYST ASPIRATION BREAST N/A 2024       Family History:  Family History   Problem Relation Age of Onset    Breast cancer Mother     Ovarian cancer Neg Hx     Uterine cancer Neg Hx     Colon cancer Neg Hx        Family Cancer History: relationship - (age of diagnosis/current age or age at death)  Breast: Mother dx age 78/  age 88 from old age    Ovarian: N/A  Colon: N/A  Pancreas: N/A  Prostate: N/A    Social History:   Social History     Socioeconomic History    Marital status:    Tobacco Use    Smoking status: Former     Passive exposure: Never    Smokeless tobacco: Never    Tobacco comments:     quit age 35   Substance and Sexual Activity    Alcohol use: Yes     Comment: occassional    Drug use: Never    Sexual activity: Yes     Birth control/protection: Post-menopausal       She lives her .  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         GYNECOLOGIC HISTORY:   G: 3. P: 3. AB: 0.  Last menstrual period: 46-47  Age at menarche: 12  Age at first childbirth: 27  Lactation/How long: N/A  Age at menopause: [age]  Total years of oral contraceptive use: 30+ years  Total years of hormone replacement therapy: hormone path only used once or twice.      Objective   PHYSICAL EXAMINATION:   Vitals:    24 0856   BP: 142/84   Pulse: 78   SpO2: 97%     Examination chaperoned by Cristal.  ECOG 0 - Asymptomatic  General: NAD, well appearing  Psych: a&o x 3, normal mood and affect  Eyes: EOMI, no scleral icterus  ENMT: neck supple without masses or thyromegaly, mucus membranes moist  Resp: normal effort  CV: RRR, no edema    GI: soft, NT, ND  MSK: normal gait, normal ROM in bilateral shoulders  Lymph nodes: no cervical, supraclavicular or axillary lymphadenopathy  Breast: symmetric, mild ptosis, bra size 42c  Right:  No visible abnormalities on inspection while seated, with arms raised or hands on hips. No masses, skin changes, or nipple abnormalities.  Left: significant bruise to 6 oclock, 12 oclock with hematoma prox 2 cm in size. No skin involvement, skin is freely mobile over bruising. No visible abnormalities on inspection while seated, with arms raised or hands on hips. No masses, skin changes, or nipple abnormalities.      Previous IMAGING: I have independently reviewed her imaging:   3/6/2024 bilateral screening mammogram ([facility])  Scattered areas of fibroglandular density.  Isodense focal asymmetry measuring 6 mm in the middle one third inferior region of the left breast.  No suspicious calcifications or areas of architectural distortion.  Biopsy clip noted, unchanged.  Right breast no suspicious masses, significant calcifications or other abnormalities are seen.  Impression: Focal asymmetry in the left breast requires additional evaluation.  Diagnostic mammogram and limited breast ultrasound is recommended.  BI-RADS 0    3/20/2024 left diagnostic mammogram ([facility])  Scattered areas of fibroglandular density.  Finding 1: Additional evaluation performed for focal asymmetry seen in the left breast inferior.  On present, there is an isodense irregular mass measuring 11 mm with spiculated margins and associated calcifications in the middle one third region of the left breast at 6:00.  Finding 3: There is a focal asymmetry measuring 10 mm with associated calcifications seen in the posterior one third outer region of the left breast.  Best seen on LS CC projection.  The left breast ultrasound  Finding 1: Ultrasound demonstrates an irregular nonparallel hypoechoic mass with spiculated margins measuring 11 x 7 x 8 mm in the middle one  third region of the left breast at 6:00 2 cm from the nipple.  Echogenic foci within the mass are consistent with calcifications seen mammographically.  Finding 2: There is an oval hypoechoic mass with indistinct margins measuring 5 x 3 x 5 mm in the left breast.  This is an incidental finding.  Impression:  Finding 1: Mass in the middle one third region of the left breast at 6:00 2 cm from the nipple is suspicious.  Ultrasound-guided biopsy is recommended.  Finding 2: Mass in the left breast is suspicious ultrasound-guided cyst aspiration is recommended, followed by ultrasound-guided biopsy if aspiration is unsuccessful.  Finding 3: Focal asymmetry in the posterior one third upper outer region of the left breast is suspicious.  Stereotactic biopsy is recommended.  However this will be reevaluated on the day of biopsy with additional diagnostic views and repeat ultrasound to determine the best biopsy approach if still indicated.  BI-RADS 4C    3/22/2024 ultrasound-guided cyst aspiration  Left breast cyst was identified at 3:00.  Approximately 1/4 cc of yellow fluid was removed.  This walls of the cyst collapsed.  With complete resolution of the cyst.  Impression successful cyst aspiration    3/22/2024 left ultrasound-guided biopsy (6:00)  The left breast at 6:00 was imaged and the mass was localized.  Using a 12-gauge core needle vacuum-assisted device 16 cores were obtained.  Using ultrasound guidance a mini cork tissue marker was placed at the biopsy site.  Hemostasis was achieved.  2 view postprocedure mammogram confirmed the clip in the expected position.  There is a 5 to 6 cm postbiopsy hematoma.  Pathology returned as invasive ductal carcinoma grade 2.  Pathology is malignant and concordant.    3/22/2024 left ultrasound-guided biopsy (12:00)  Left breast at 12:00 was imaged and the mass was localized with calcifications at 12-gauge vacuum-assisted device was utilized and 13 cores were obtained.  A Top-Hat  shaped tissue marker was placed on the biopsy site.  Hemostasis was achieved.  Follow-up mammogram showed Top-Hat within the expected position residual calcifications are seen but difficult to assess.  Specimen radiograph confirmed sampling of calcifications.  5 to 6 cm postbiopsy hematoma was noted.  Pathology returned as invasive lobular carcinoma, ductal carcinoma in situ, atypical lobular hyperplasia.  Pathology is malignant and concordant.    3/27/24 Breast MRI  FINDINGS: Scattered fibroglandular tissue is seen throughout both  breast. Mild background parenchymal enhancement of both breasts is  noted.     In the left breast at the 12 o'clock position centered on the order of 8  cm from the nipple there is an irregular enhancing mass that measures  1.3 cm in anterior to posterior dimension, 1.2 cm in the  superior-inferior dimension and 1 cm in the medial to lateral dimension.  A centrally located signal void is noted within the mass. This  corresponds to the biopsy-proven malignancy with the internal top hat  shaped metallic clip.     In the middle third of the left breast at the 6 o'clock position  centered on the order of 4.5 cm from the nipple there is an irregular  enhancing mass that measures on the order of 0.8 cm in the anterior to  posterior dimension, 0.7 cm in the superior to inferior dimension and  1.2 cm in the medial to lateral dimension. A centrally located signal  void is noted. This corresponds to the biopsy-proven malignancy with the  internal mini cork shaped metallic clip.     No other areas of suspicious enhancement or morphology are seen in the  left breast. I see no evidence for abnormal skin, nipple or chest wall  enhancement of the left breast and there is no evidence for left  axillary or internal mammary chain adenopathy.     There are no areas of suspicious enhancement or morphology in the right  breast. I see no evidence for abnormal skin, nipple or chest wall  enhancement of the  right breast and there is no evidence for right  axillary or internal mammary chain adenopathy.     IMPRESSION:  1. Biopsy-proven malignancy in the left breast at the 12 o'clock  position in the posterior one third that measures on the order of 1.3 cm  in greatest dimension with the internal top hat shaped metallic clip. No  other evidence for left axillary adenopathy is appreciated.     2. Biopsy-proven malignancy in the left breast at the 6 o'clock position  in the middle third that measures on the order of 1.2 cm in greatest  dimension with the internal mini cork shaped metallic clip.        3. There are no findings suspicious for malignancy in the right breast.     BI-RADS category 6: Known malignancy.    PATHOLOGY:         Assessment & Plan   Assessment:  63 y.o. F with a new diagnosis of left breast multifocal: Invasive ductal and invasive lobular carcinoma grade 2/3, ER/NC +, Her2 -; [id number], anatomic stage IA, prognostic stage IA.      Discussion:  I had an extensive discussion with the patient and her family about the nature of her breast cancer diagnosis. We reviewed the components of breast tissue including ducts and lobules. We reviewed her pathology report in detail. We reviewed breast cancer histology, including stage, grade, ER/NC receptors, HER2 receptors and how this applies to her diagnosis. We reviewed the basics of systemic and local/regional management of breast cancer.      We discussed that in her case, systemic treatment would involve endocrine therapy and possibly chemotherapy. She will be referred to medical oncology postoperatively to discuss this further.     We reviewed potential surgical treatments to include partial mastectomy, mastectomy, sentinel lymph node biopsy and axillary node dissection and discussed the rationale associated with each approach. Regarding radiation therapy, we discussed that radiation is indicated in all cases of breast conservation and in only limited  circumstances following mastectomy. Partial mastectomy with radiation and mastectomy were explained with option of reconstruction.  The patient was informed that from a survival standpoint, both procedures are oncologically equivalent.  We discussed that the primary goal of adjuvant radiation is to decrease the likelihood of local recurrence.     In her case she is not a candidate for breast conservation due to multifocal disease at opposite sides of her breast. Mastectomy was discussed including surgical incision planning and reconstruction options. She is considering bilateral surgery for peace of mind, her cancer diagnosis has been extremely stressful    I explained that most breast cancer is not hereditary, that I do not believe this plays a role in her case, and that she does not meet NCCN criteria for genetic testing. I would not recommend a bilateral mastectomy, since her risk of a second primary cancer is relatively low and endocrine therapy will further reduce her risk. She is also at an elevated risk of wound healing issues due to her diabetes and elevated A1c.    I described additional risks and potential complications associated with surgery, including, but not limited to, bleeding, infection, deformity/poor cosmetic result, chronic pain, numbness, seroma, hematoma, deep venous thrombosis, skin flap necrosis, disease recurrence and the possibility of requiring additional surgery. We also discussed other treatment options including the option of not undergoing any surgical treatment with a palliative rather than curative intent and the risks associated with this including disease progression. She expressed an understanding of these factors and wished to proceed.    We discussed axillary staging. I described the procedure for sentinel lymph node biopsy in detail, including the preoperative injection of a radiotracer and intraoperative injection of lymphazurin blue dye. I explained that this is a mapping  test and not a cancer test, that all of the lymph nodes containing these dyes will be removed for complete testing by pathology, and that the results could impact the decision for adjuvant treatment or additional surgery.    I described additional risks and potential complications associated with surgery, including, but not limited to, bleeding, infection, complications related to blue dye, lymphedema, deformity/poor cosmetic result, chronic pain, neurovascular injury, numbness, seroma, hematoma, deep venous thrombosis, skin flap necrosis, disease recurrence and the possibility of requiring additional surgery. We also discussed other treatment options including the option of not undergoing any surgical treatment and the risks associated with this including disease progression. She expressed an understanding of these factors and wished to proceed.      Plan:  A multidisciplinary plan has been formulated for the patient:      # Breast Surgical Oncology:  - plan for at least left mastectomy and sentinel lymph node biopsy.  -Plastic surgery referral.   - RTC after PRS appointment for final consents and surgical planning. She prefers to meet with Dr. Duran. Will do our best to coordinate surgical timing with our office.    # Medical Oncology:  - oncotype candidate  - will need 5-10 years of hormone blockade, for invasive cancer and ALH  -Will refer postoperatively.    #  Radiation Oncology:  -Will refer postoperatively.    # Nurse Navigation  - Referral made today  - discussed need for support groups vs peer support and more importantly therapy for stress of diagnosis on top of baseline depression.     # Lymphedema clinic  - Referral was placed today     # Genetic Testing   - not indicated per NCCN    # Breast Imaging  - Next Screening mammogram due: 3/2025      Riddhi Amezcua MD  Breast Surgical Oncology    I spent 65 minutes caring for Ms Yanes on this date of service. This time includes time spent by me in the  following activities: preparing for the visit, reviewing tests, obtaining and/or reviewing a separately obtained history, performing a medically appropriate examination and/or evaluation , counseling and educating the patient/family/caregiver, referring and communicating with other health care professionals , documenting information in the medical record, independently interpreting results and communicating that information with the patient/family/caregiver, and care coordination.      CC:  Dr. Jermaine Schuster

## 2024-03-28 LAB — CREAT BLDA-MCNC: 0.6 MG/DL (ref 0.6–1.3)

## 2024-03-29 ENCOUNTER — TELEPHONE (OUTPATIENT)
Dept: OBSTETRICS AND GYNECOLOGY | Facility: CLINIC | Age: 64
End: 2024-03-29
Payer: COMMERCIAL

## 2024-03-29 RX ORDER — HYDROXYZINE HYDROCHLORIDE 25 MG/1
25 TABLET, FILM COATED ORAL 3 TIMES DAILY PRN
Qty: 30 TABLET | Refills: 0 | Status: SHIPPED | OUTPATIENT
Start: 2024-03-29

## 2024-03-29 NOTE — TELEPHONE ENCOUNTER
Tab Mercado- she can make an appointment with Dr. Dean if she feels she needs something stronger, but I will start with hydroxyzine which can be taken as needed.  It can cause sedation so do not recommend taking before driving or operating machinery.

## 2024-03-29 NOTE — TELEPHONE ENCOUNTER
Jermaine pt called stated she was diagnosed with breast cancer and would like some anxiety medicine sent in if possible.   Pharmacy is updated in [facility].    Please Advise?

## 2024-04-05 ENCOUNTER — OFFICE VISIT (OUTPATIENT)
Dept: SURGERY | Facility: CLINIC | Age: 64
End: 2024-04-05
Payer: COMMERCIAL

## 2024-04-05 ENCOUNTER — HOSPITAL ENCOUNTER (OUTPATIENT)
Dept: SURGERY | Facility: HOSPITAL | Age: 64
Discharge: HOME | End: 2024-04-05
Payer: COMMERCIAL

## 2024-04-05 VITALS
BODY MASS INDEX: 30.56 KG/M2 | SYSTOLIC BLOOD PRESSURE: 142 MMHG | HEIGHT: 64 IN | HEART RATE: 78 BPM | WEIGHT: 179 LBS | DIASTOLIC BLOOD PRESSURE: 84 MMHG | OXYGEN SATURATION: 97 %

## 2024-04-05 DIAGNOSIS — C50.812 MALIGNANT NEOPLASM OF OVERLAPPING SITES OF LEFT BREAST IN FEMALE, ESTROGEN RECEPTOR POSITIVE: Primary | ICD-10-CM

## 2024-04-05 DIAGNOSIS — Z17.0 MALIGNANT NEOPLASM OF OVERLAPPING SITES OF LEFT BREAST IN FEMALE, ESTROGEN RECEPTOR POSITIVE: ICD-10-CM

## 2024-04-05 DIAGNOSIS — Z17.0 MALIGNANT NEOPLASM OF OVERLAPPING SITES OF LEFT BREAST IN FEMALE, ESTROGEN RECEPTOR POSITIVE: Primary | ICD-10-CM

## 2024-04-05 DIAGNOSIS — C50.812 MALIGNANT NEOPLASM OF OVERLAPPING SITES OF LEFT BREAST IN FEMALE, ESTROGEN RECEPTOR POSITIVE: ICD-10-CM

## 2024-04-05 RX ORDER — LORAZEPAM 0.5 MG/1
0.5 TABLET ORAL EVERY 8 HOURS PRN
COMMUNITY

## 2024-04-05 RX ORDER — ROSUVASTATIN CALCIUM 40 MG/1
40 TABLET, COATED ORAL DAILY
COMMUNITY
Start: 2023-12-14

## 2024-04-05 RX ORDER — EMPAGLIFLOZIN 25 MG/1
25 TABLET, FILM COATED ORAL DAILY
COMMUNITY

## 2024-04-05 RX ORDER — FLUOROURACIL 50 MG/G
CREAM TOPICAL
COMMUNITY
Start: 2024-03-11

## 2024-04-05 NOTE — LETTER
April 5, 2024       No Recipients    Patient: Colleen Yanes   YOB: 1960   Date of Visit: 4/5/2024     Dear Temo Schuster MD:       Thank you for referring Colleen Yanes to me for evaluation. Below are the relevant portions of my assessment and plan of care.    If you have questions, please do not hesitate to call me. I look forward to following Colleen along with you.         Sincerely,        Riddhi Amezcua MD        CC:   No Recipients    Riddhi Amezcua MD  04/05/24 1131  Sign when Signing Visit  [facility]     Referring Provider: Dorothy Dean MD     Chief complaint: newly diagnosed breast cancer    Subjective   HPI: Ms. Colleen Yanes is a 63 y.o. yo woman, seen at the request of Dr. Dean for a new diagnosis of left breast cancer.      This was initially detected as an imaging abnormality. Her work-up is detailed in the oncologic history below.   She denies any breast lumps, pain, skin changes, or nipple discharge.    She previously underwent 1 prior breast biopsy in 2020, left breast - reportedly benign pathology. No changes to her over all health. Since this diagnosis has been extreemly anxious, her PCP has added ativan to help with acute stress.    She reports she has a pertinent PMH of DM not on insulin, managed with mounjaro (A1c in January was 9) and metformin, depression, HTN, HLD, GERD.     She was joined today in clinic by her . They all participated in the discussion, after her examination, and she gave her consent for them to participate.  She gave consent for her  to be present during her examination and participate in the discussion.          Oncology/Hematology History    No history exists.       Review of Systems   Constitutional:  Negative for appetite change, fatigue and fever.   Respiratory:  Negative for cough and shortness of breath.    Gastrointestinal:  Positive for constipation. Negative for abdominal distention, diarrhea and nausea.   Musculoskeletal:   Negative for arthralgias and back pain.   Neurological:  Negative for dizziness, headaches and speech difficulty.   Psychiatric/Behavioral:  Positive for depression and sleep disturbance. Negative for decreased concentration.        Medications:    Current Outpatient Medications:   •  clobetasol (TEMOVATE) 0.05 % ointment, Apply 1 application topically to the appropriate area as directed 2 (Two) Times a Week., Disp: 60 g, Rfl: 2  •  esomeprazole (nexIUM) 40 MG capsule, TK 1 C PO QD, Disp: , Rfl: 2  •  fluorouracil (EFUDEX) 5 % cream, , Disp: , Rfl:   •  hydrOXYzine (ATARAX) 25 MG tablet, Take 1 tablet by mouth 3 (Three) Times a Day As Needed for Anxiety., Disp: 30 tablet, Rfl: 0  •  INVOKANA 300 MG tablet, TK 1 T PO QAM B NATE, Disp: , Rfl: 1  •  Jardiance 25 MG tablet tablet, Take 1 tablet by mouth Daily., Disp: , Rfl:   •  LORazepam (ATIVAN) 0.5 MG tablet, Take 1 tablet by mouth Every 8 (Eight) Hours As Needed for Anxiety., Disp: , Rfl:   •  losartan (COZAAR) 25 MG tablet, Take 1 tablet by mouth Daily., Disp: , Rfl: 1  •  metFORMIN ER (GLUCOPHAGE-XR) 500 MG 24 hr tablet, TK 2 TS PO BID WC, Disp: , Rfl: 3  •  metoprolol succinate XL (TOPROL-XL) 25 MG 24 hr tablet, TK 1 T PO  QD, Disp: , Rfl: 1  •  pramipexole (MIRAPEX) 0.5 MG tablet, Take 1 tablet by mouth Daily., Disp: , Rfl:   •  pravastatin (PRAVACHOL) 40 MG tablet, TK 1 T PO D, Disp: , Rfl: 1  •  rosuvastatin (CRESTOR) 40 MG tablet, Take 1 tablet by mouth Daily., Disp: , Rfl:   •  Tirzepatide (Mounjaro) 12.5 MG/0.5ML solution pen-injector pen, Inject 0.5 mL under the skin into the appropriate area as directed 1 (One) Time Per Week., Disp: , Rfl:   •  venlafaxine XR (EFFEXOR-XR) 75 MG 24 hr capsule, TK 1 C PO D, Disp: , Rfl: 1  •  vitamin D3 (vitamin d) 125 MCG (5000 UT) capsule capsule, Take  by mouth., Disp: , Rfl:   •  Alpha-Lipoic Acid 600 MG capsule, Take  by mouth. (Patient not taking: Reported on 3/6/2024), Disp: , Rfl:   •  CALCIUM-MAG-VIT C-VIT D PO,  Take  by mouth. (Patient not taking: Reported on 3/6/2024), Disp: , Rfl:   •  OZEMPIC 1 MG/DOSE solution pen-injector, INJECT 1 MG INTO THE SKIN Q 7 DAYS (Patient not taking: Reported on 3/6/2024), Disp: , Rfl: 3    Allergies:  Allergies   Allergen Reactions   • Lisinopril Other (See Comments)     cough  cough  cough   • Simvastatin Other (See Comments)     myalgias  myalgias  myalgias   • Sulfa Antibiotics Itching and Rash   • Sulfasalazine Itching and Rash       Medical history:  Past Medical History:   Diagnosis Date   • Depression    • Diabetes mellitus    • GERD (gastroesophageal reflux disease)    • Heart palpitations    • Hyperlipidemia    • Restless leg        Surgical History:  Past Surgical History:   Procedure Laterality Date   • BREAST BIOPSY Left      AND    • BREAST CYST ASPIRATION Left    •  SECTION     • DILATATION AND CURETTAGE     • US GUIDED CYST ASPIRATION BREAST N/A 2024       Family History:  Family History   Problem Relation Age of Onset   • Breast cancer Mother    • Ovarian cancer Neg Hx    • Uterine cancer Neg Hx    • Colon cancer Neg Hx        Family Cancer History: relationship - (age of diagnosis/current age or age at death)  Breast: Mother dx age 78/  age 88 from old age    Ovarian: N/A  Colon: N/A  Pancreas: N/A  Prostate: N/A    Social History:   Social History     Socioeconomic History   • Marital status:    Tobacco Use   • Smoking status: Former     Passive exposure: Never   • Smokeless tobacco: Never   • Tobacco comments:     quit age 35   Substance and Sexual Activity   • Alcohol use: Yes     Comment: occassional   • Drug use: Never   • Sexual activity: Yes     Birth control/protection: Post-menopausal       She lives her .  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         GYNECOLOGIC HISTORY:   G: 3. P: 3. AB: 0.  Last menstrual period: 46-47  Age at menarche: 12  Age at first childbirth: 27  Lactation/How  long: N/A  Age at menopause: [age]  Total years of oral contraceptive use: 30+ years  Total years of hormone replacement therapy: hormone path only used once or twice.      Objective  PHYSICAL EXAMINATION:   Vitals:    04/05/24 0856   BP: 142/84   Pulse: 78   SpO2: 97%     Examination chaperoned by Cristal.  ECOG 0 - Asymptomatic  General: NAD, well appearing  Psych: a&o x 3, normal mood and affect  Eyes: EOMI, no scleral icterus  ENMT: neck supple without masses or thyromegaly, mucus membranes moist  Resp: normal effort  CV: RRR, no edema   GI: soft, NT, ND  MSK: normal gait, normal ROM in bilateral shoulders  Lymph nodes: no cervical, supraclavicular or axillary lymphadenopathy  Breast: symmetric, mild ptosis, bra size 42c  Right:  No visible abnormalities on inspection while seated, with arms raised or hands on hips. No masses, skin changes, or nipple abnormalities.  Left: significant bruise to 6 oclock, 12 oclock with hematoma prox 2 cm in size. No skin involvement, skin is freely mobile over bruising. No visible abnormalities on inspection while seated, with arms raised or hands on hips. No masses, skin changes, or nipple abnormalities.      Previous IMAGING: I have independently reviewed her imaging:   3/6/2024 bilateral screening mammogram ([facility])  Scattered areas of fibroglandular density.  Isodense focal asymmetry measuring 6 mm in the middle one third inferior region of the left breast.  No suspicious calcifications or areas of architectural distortion.  Biopsy clip noted, unchanged.  Right breast no suspicious masses, significant calcifications or other abnormalities are seen.  Impression: Focal asymmetry in the left breast requires additional evaluation.  Diagnostic mammogram and limited breast ultrasound is recommended.  BI-RADS 0    3/20/2024 left diagnostic mammogram ([facility])  Scattered areas of fibroglandular density.  Finding 1: Additional evaluation performed for focal asymmetry seen in the left breast  inferior.  On present, there is an isodense irregular mass measuring 11 mm with spiculated margins and associated calcifications in the middle one third region of the left breast at 6:00.  Finding 3: There is a focal asymmetry measuring 10 mm with associated calcifications seen in the posterior one third outer region of the left breast.  Best seen on LS CC projection.  The left breast ultrasound  Finding 1: Ultrasound demonstrates an irregular nonparallel hypoechoic mass with spiculated margins measuring 11 x 7 x 8 mm in the middle one third region of the left breast at 6:00 2 cm from the nipple.  Echogenic foci within the mass are consistent with calcifications seen mammographically.  Finding 2: There is an oval hypoechoic mass with indistinct margins measuring 5 x 3 x 5 mm in the left breast.  This is an incidental finding.  Impression:  Finding 1: Mass in the middle one third region of the left breast at 6:00 2 cm from the nipple is suspicious.  Ultrasound-guided biopsy is recommended.  Finding 2: Mass in the left breast is suspicious ultrasound-guided cyst aspiration is recommended, followed by ultrasound-guided biopsy if aspiration is unsuccessful.  Finding 3: Focal asymmetry in the posterior one third upper outer region of the left breast is suspicious.  Stereotactic biopsy is recommended.  However this will be reevaluated on the day of biopsy with additional diagnostic views and repeat ultrasound to determine the best biopsy approach if still indicated.  BI-RADS 4C    3/22/2024 ultrasound-guided cyst aspiration  Left breast cyst was identified at 3:00.  Approximately 1/4 cc of yellow fluid was removed.  This walls of the cyst collapsed.  With complete resolution of the cyst.  Impression successful cyst aspiration    3/22/2024 left ultrasound-guided biopsy (6:00)  The left breast at 6:00 was imaged and the mass was localized.  Using a 12-gauge core needle vacuum-assisted device 16 cores were obtained.  Using  ultrasound guidance a mini cork tissue marker was placed at the biopsy site.  Hemostasis was achieved.  2 view postprocedure mammogram confirmed the clip in the expected position.  There is a 5 to 6 cm postbiopsy hematoma.  Pathology returned as invasive ductal carcinoma grade 2.  Pathology is malignant and concordant.    3/22/2024 left ultrasound-guided biopsy (12:00)  Left breast at 12:00 was imaged and the mass was localized with calcifications at 12-gauge vacuum-assisted device was utilized and 13 cores were obtained.  A Top-Hat shaped tissue marker was placed on the biopsy site.  Hemostasis was achieved.  Follow-up mammogram showed Top-Hat within the expected position residual calcifications are seen but difficult to assess.  Specimen radiograph confirmed sampling of calcifications.  5 to 6 cm postbiopsy hematoma was noted.  Pathology returned as invasive lobular carcinoma, ductal carcinoma in situ, atypical lobular hyperplasia.  Pathology is malignant and concordant.    3/27/24 Breast MRI  FINDINGS: Scattered fibroglandular tissue is seen throughout both  breast. Mild background parenchymal enhancement of both breasts is  noted.     In the left breast at the 12 o'clock position centered on the order of 8  cm from the nipple there is an irregular enhancing mass that measures  1.3 cm in anterior to posterior dimension, 1.2 cm in the  superior-inferior dimension and 1 cm in the medial to lateral dimension.  A centrally located signal void is noted within the mass. This  corresponds to the biopsy-proven malignancy with the internal top hat  shaped metallic clip.     In the middle third of the left breast at the 6 o'clock position  centered on the order of 4.5 cm from the nipple there is an irregular  enhancing mass that measures on the order of 0.8 cm in the anterior to  posterior dimension, 0.7 cm in the superior to inferior dimension and  1.2 cm in the medial to lateral dimension. A centrally located  signal  void is noted. This corresponds to the biopsy-proven malignancy with the  internal mini cork shaped metallic clip.     No other areas of suspicious enhancement or morphology are seen in the  left breast. I see no evidence for abnormal skin, nipple or chest wall  enhancement of the left breast and there is no evidence for left  axillary or internal mammary chain adenopathy.     There are no areas of suspicious enhancement or morphology in the right  breast. I see no evidence for abnormal skin, nipple or chest wall  enhancement of the right breast and there is no evidence for right  axillary or internal mammary chain adenopathy.     IMPRESSION:  1. Biopsy-proven malignancy in the left breast at the 12 o'clock  position in the posterior one third that measures on the order of 1.3 cm  in greatest dimension with the internal top hat shaped metallic clip. No  other evidence for left axillary adenopathy is appreciated.     2. Biopsy-proven malignancy in the left breast at the 6 o'clock position  in the middle third that measures on the order of 1.2 cm in greatest  dimension with the internal mini cork shaped metallic clip.        3. There are no findings suspicious for malignancy in the right breast.     BI-RADS category 6: Known malignancy.    PATHOLOGY:         Assessment & Plan  Assessment:  63 y.o. F with a new diagnosis of left breast multifocal: Invasive ductal and invasive lobular carcinoma grade 2/3, ER/IN +, Her2 -; [id number], anatomic stage IA, prognostic stage IA.      Discussion:  I had an extensive discussion with the patient and her family about the nature of her breast cancer diagnosis. We reviewed the components of breast tissue including ducts and lobules. We reviewed her pathology report in detail. We reviewed breast cancer histology, including stage, grade, ER/IN receptors, HER2 receptors and how this applies to her diagnosis. We reviewed the basics of systemic and local/regional management of  breast cancer.      We discussed that in her case, systemic treatment would involve endocrine therapy and possibly chemotherapy. She will be referred to medical oncology postoperatively to discuss this further.     We reviewed potential surgical treatments to include partial mastectomy, mastectomy, sentinel lymph node biopsy and axillary node dissection and discussed the rationale associated with each approach. Regarding radiation therapy, we discussed that radiation is indicated in all cases of breast conservation and in only limited circumstances following mastectomy. Partial mastectomy with radiation and mastectomy were explained with option of reconstruction.  The patient was informed that from a survival standpoint, both procedures are oncologically equivalent.  We discussed that the primary goal of adjuvant radiation is to decrease the likelihood of local recurrence.     In her case she is not a candidate for breast conservation due to multifocal disease at opposite sides of her breast. Mastectomy was discussed including surgical incision planning and reconstruction options. She is considering bilateral surgery for peace of mind, her cancer diagnosis has been extremely stressful    I explained that most breast cancer is not hereditary, that I do not believe this plays a role in her case, and that she does not meet NCCN criteria for genetic testing. I would not recommend a bilateral mastectomy, since her risk of a second primary cancer is relatively low and endocrine therapy will further reduce her risk. She is also at an elevated risk of wound healing issues due to her diabetes and elevated A1c.    I described additional risks and potential complications associated with surgery, including, but not limited to, bleeding, infection, deformity/poor cosmetic result, chronic pain, numbness, seroma, hematoma, deep venous thrombosis, skin flap necrosis, disease recurrence and the possibility of requiring additional  surgery. We also discussed other treatment options including the option of not undergoing any surgical treatment with a palliative rather than curative intent and the risks associated with this including disease progression. She expressed an understanding of these factors and wished to proceed.    We discussed axillary staging. I described the procedure for sentinel lymph node biopsy in detail, including the preoperative injection of a radiotracer and intraoperative injection of lymphazurin blue dye. I explained that this is a mapping test and not a cancer test, that all of the lymph nodes containing these dyes will be removed for complete testing by pathology, and that the results could impact the decision for adjuvant treatment or additional surgery.    I described additional risks and potential complications associated with surgery, including, but not limited to, bleeding, infection, complications related to blue dye, lymphedema, deformity/poor cosmetic result, chronic pain, neurovascular injury, numbness, seroma, hematoma, deep venous thrombosis, skin flap necrosis, disease recurrence and the possibility of requiring additional surgery. We also discussed other treatment options including the option of not undergoing any surgical treatment and the risks associated with this including disease progression. She expressed an understanding of these factors and wished to proceed.      Plan:  A multidisciplinary plan has been formulated for the patient:      # Breast Surgical Oncology:  - plan for at least left mastectomy and sentinel lymph node biopsy.  -Plastic surgery referral.   - RTC after PRS appointment for final consents and surgical planning. She prefers to meet with Dr. Duran. Will do our best to coordinate surgical timing with our office.    # Medical Oncology:  - oncotype candidate  - will need 5-10 years of hormone blockade, for invasive cancer and ALH  -Will refer postoperatively.    #  Radiation  Oncology:  -Will refer postoperatively.    # Nurse Navigation  - Referral made today  - discussed need for support groups vs peer support and more importantly therapy for stress of diagnosis on top of baseline depression.     # Lymphedema clinic  - Referral was placed today     # Genetic Testing   - not indicated per NCCN    # Breast Imaging  - Next Screening mammogram due: 3/2025      Riddhi Amezcua MD  Breast Surgical Oncology    I spent 65 minutes caring for Ms Yanes on this date of service. This time includes time spent by me in the following activities: preparing for the visit, reviewing tests, obtaining and/or reviewing a separately obtained history, performing a medically appropriate examination and/or evaluation , counseling and educating the patient/family/caregiver, referring and communicating with other health care professionals , documenting information in the medical record, independently interpreting results and communicating that information with the patient/family/caregiver, and care coordination.      CC:  Dr. Jermaine Schuster

## 2024-04-09 ENCOUNTER — PATIENT OUTREACH (OUTPATIENT)
Dept: OTHER | Facility: HOSPITAL | Age: 64
End: 2024-04-09
Payer: COMMERCIAL

## 2024-04-09 NOTE — PROGRESS NOTES
Referral received from Dr. Amezcua's office. I called Ms. Yanes and introduced myself and navigational services. She stated the consult with Dr. Amezcua went well and she is leaning toward a mastectomy with reconstruction. She will see Dr. Grajeda for plastic surgery next week. She has a good understanding of her pathology and treatment options. She was able to verbalize teach back on her plan of care.      She stated she has a wonderful support system with her , children and friends. One of her sons is a doctor and has been very helpful. She stated she feels comfortable talking to them about needs or issues.      She stated she has no financial or transportation concerns at this time. She has no resource needs or ongoing concerns at this time.      She stated she has been anxious about her diagnosis and we discussed that can be normal. Her PCP prescribes ativan and Effexor for her anxiety and depression. We discussed we have support options if the need arises. She was thankful for the information.      We discussed integrative therapies and other services [facility]. She will received a navigation folder with the following information in the mail:     [facility], Cancer and Restorative Exercise (CARE), [employer] Exercise program, Guide for the Newly Diagnosed, [facility], [facility], Massage Therapy, Reiki Therapy, [facility] [place], Cancer Nutrition, and [facility].     She verbalized appreciation for navigational services and she has my contact information and will call with any questions that arise.

## 2024-04-16 ENCOUNTER — PATIENT OUTREACH (OUTPATIENT)
Dept: OTHER | Facility: HOSPITAL | Age: 64
End: 2024-04-16
Payer: COMMERCIAL

## 2024-04-16 ENCOUNTER — LAB (OUTPATIENT)
Dept: LAB | Facility: HOSPITAL | Age: 64
End: 2024-04-16
Payer: COMMERCIAL

## 2024-04-16 ENCOUNTER — HOSPITAL ENCOUNTER (OUTPATIENT)
Dept: OCCUPATIONAL THERAPY | Facility: HOSPITAL | Age: 64
Setting detail: THERAPIES SERIES
Discharge: HOME | End: 2024-04-16
Payer: COMMERCIAL

## 2024-04-16 DIAGNOSIS — C50.812 MALIGNANT NEOPLASM OF OVERLAPPING SITES OF LEFT BREAST IN FEMALE, ESTROGEN RECEPTOR POSITIVE: Primary | ICD-10-CM

## 2024-04-16 DIAGNOSIS — Z01.818 OTHER SPECIFIED PRE-OPERATIVE EXAMINATION: Primary | ICD-10-CM

## 2024-04-16 DIAGNOSIS — C50.812 MALIGNANT NEOPLASM OF OVERLAPPING SITES OF LEFT BREAST IN FEMALE, ESTROGEN RECEPTOR POSITIVE: ICD-10-CM

## 2024-04-16 DIAGNOSIS — Z17.0 MALIGNANT NEOPLASM OF OVERLAPPING SITES OF LEFT BREAST IN FEMALE, ESTROGEN RECEPTOR POSITIVE: ICD-10-CM

## 2024-04-16 DIAGNOSIS — Z17.0 MALIGNANT NEOPLASM OF OVERLAPPING SITES OF LEFT BREAST IN FEMALE, ESTROGEN RECEPTOR POSITIVE: Primary | ICD-10-CM

## 2024-04-16 DIAGNOSIS — Z91.89 AT RISK FOR LYMPHEDEMA: ICD-10-CM

## 2024-04-16 LAB — HBA1C MFR BLD: 8.8 % (ref 4.8–5.6)

## 2024-04-16 PROCEDURE — 36415 COLL VENOUS BLD VENIPUNCTURE: CPT

## 2024-04-16 PROCEDURE — 97165 OT EVAL LOW COMPLEX 30 MIN: CPT

## 2024-04-16 PROCEDURE — 83036 HEMOGLOBIN GLYCOSYLATED A1C: CPT

## 2024-04-16 PROCEDURE — 93702 BIS XTRACELL FLUID ANALYSIS: CPT

## 2024-04-16 NOTE — PROGRESS NOTES
Ms. Yanes called tearful asking if there was someone she can talk to and who could help manage her anti depression medication. Currently she is on Effexor and Ativan from her PCP and realizes it is not helping as much as it use to. She is struggling with insomnia, increased anxiety and depression along with grief from her recent diagnosis and the loss of her brother last month. Asked if she had thoughts of self harm at this time and she stated she did not. Referral placed to behavioral oncology for an appointment as soon as possible. She was thankful for the help

## 2024-04-16 NOTE — THERAPY EVALUATION
Outpatient Occupational Therapy Lymphedema Initial Evaluation  [facility]     Patient Name: Colleen Yanes  : 1960  MRN: [id number]  Today's Date: 2024      Visit Date: 2024    Patient Active Problem List   Diagnosis    Well woman exam with routine gynecological exam    Malignant neoplasm of overlapping sites of left breast in female, estrogen receptor positive        Past Medical History:   Diagnosis Date    Depression     Diabetes mellitus     GERD (gastroesophageal reflux disease)     Heart palpitations     Hyperlipidemia     Restless leg         Past Surgical History:   Procedure Laterality Date    BREAST BIOPSY Left      AND     BREAST CYST ASPIRATION Left      SECTION      DILATATION AND CURETTAGE      US GUIDED CYST ASPIRATION BREAST N/A 2024         Visit Dx:     ICD-10-CM ICD-9-CM   1. Other specified pre-operative examination  Z01.818 V72.83   2. At risk for lymphedema  Z91.89 V49.89   3. Malignant neoplasm of overlapping sites of left breast in female, estrogen receptor positive  C50.812 174.8    Z17.0 V86.0        Patient History       Row Name 24 0900             History    Chief Complaint Other 1 (comment)  pre-op  -RE      Patient/Caregiver Goals Know what to do to help the symptoms;Return to prior level of function  -RE      History of Previous Related Injuries none  -RE         Pain     Pain at Present 0  -RE      Pain at Best 0  -RE      Pain at Worst 0  -RE      Pain Comments no pain  -RE      Is your sleep disturbed? No  -RE      Difficulties at work? No issues.  -RE      Difficulties with ADL's? No issues.  -RE      Difficulties with recreational activities? No issues.  -RE         Fall Risk Assessment    Any falls in the past year: No  -RE         Services    Prior Rehab/Home Health Experiences No  -RE      Are you currently receiving Home Health services No  -RE      Do you plan to receive Home Health services in the near future No  -RE          Daily Activities    Primary Language English  -RE      Are you able to read Yes  -RE      Are you able to write Yes  -RE      How does patient learn best? Listening;Reading;Demonstration  -RE      Barriers to learning None  -RE      Pt Participated in POC and Goals Yes  -RE         Safety    Are you being hurt, hit, or frightened by anyone at home or in your life? No  -RE      Are you being neglected by a caregiver No  -RE      Have you had any of the following issues with N/A  -RE                User Key  (r) = Recorded By, (t) = Taken By, (c) = Cosigned By      Initials Name Provider Type    RE Yesi Pedroza OTR Occupational Therapist                     Lymphedema       Row Name 04/16/24 0900             Subjective Pain    Able to rate subjective pain? yes  -RE      Pre-Treatment Pain Level 0  -RE      Subjective Pain Comment pre-operative; denies pain  -RE         Lymphedema Assessment    Lymphedema Classification at risk/stage 0;LUE:  -RE      Lymphedema Surgery Comments pre-operative  -RE         Posture/Observations    Posture/Observations Comments WNL  -RE         General ROM    GENERAL ROM COMMENTS BUE WFL  -RE         MMT (Manual Muscle Testing)    General MMT Comments BUE WFL  -RE         Lymphedema Edema Assessment    Edema Assessment Comment no obvious edema; pre-operative  -RE         Skin Changes/Observations    Skin Observations Comment normal  -RE         Lymphedema Sensation    Lymphedema Sensation Comments normal  -RE         Lymphedema Pulses/Capillary Refill    Lymph Pulses Capillary Refill Comments normal  -RE         Lymphedema Measurements    Measurement Type(s) --  -RE         Compression/Skin Care    Compression/Skin Care Comments discussed compression garment for prevention  -RE         L-Dex Bioimpedence Screening    L-Dex Measurement Extremity LUE  -RE      L-Dex Patient Position Standing  -RE      L-Dex UE Dominate Side Right  -RE      L-Dex UE At Risk Side Left  -RE      L-Dex UE  "Pre Surgical Value Yes  -RE      L-Dex UE Score -2.9  -RE      L-Dex UE Baseline Score -2.9  -RE      L-Dex UE Value Change 0  -RE      L-Dex UE Comment WNL  -RE      $ L-Dex Charge yes  -RE                User Key  (r) = Recorded By, (t) = Taken By, (c) = Cosigned By      Initials Name Provider Type    Yesi Nunez OTR Occupational Therapist                    The patient had a preoperative baseline SOZO measurement which I reviewed today. The score is WNL  see scanned to EMR. Bioimpedance spectroscopy helps identify the   onset of lymphedema in an arm or leg before patients experience noticeable swelling. Research has   shown that 92% of patients with early detection of lymphedema using L-Dex combined with   intervention do not progress to chronic lymphedema through three years. Additionally, as of March 2023, the NCCN Guidelines® for Survivorship recommend proactive screening for lymphedema using   bioimpedance spectroscopy. Whenever possible, patients are tested for baseline L-Dex score before   cancer treatment begins and then are reassessed during regular follow-up visits using the SOZO device.   Otherwise, this can be started postoperatively and continued during regular follow-up visits. If the   patient’s L-Dex score increases above normal levels, that is a sign that lymphedema is developing and a   referral is made to occupational therapy for further evaluation and early compression treatment.   Lymphedema assessment with the SOZO L-Dex score is recommended to be done every 3 months for   the first 3 years and then every 6 months for years 4 and 5 followed by annually afterwards        Therapy Education  Education Details: Discussed personal risk factors for lymphedema postoperatively for the affected upper extremity and trunk quadrant.  I provided patient with \"What Is Lymphedema\" and \"Healthy Habits for Patients at Risk for Lymphedema\" and reviewed with the patient.  We also discussed indications " for bioimpedance and patient's current L-Dex value.  I also explained the recommended prevention and surveillance schedule using bioimpedance.  Given: Symptoms/condition management, Other (comment)  Program: New  How Provided: Verbal, Written  Provided to: Patient  Level of Understanding: Teach back education performed, Verbalized         OT Goals       Row Name 04/16/24 1200          OT Short Term Goals    STG Date to Achieve 05/16/24  -RE     STG 1 Patient will demonstrate proper awareness of “What is Lymphedema?” and “Healthy Habits” for improved prevention, management, care of symptoms and ease of transition to self-care of condition.  -RE     STG 1 Progress New  -RE        Long Term Goals    LTG Date to Achieve 07/16/24  -RE     LTG 1 Patient will participate in bioimpedance scans every 3 months as a method of early detection of lymphedema to allow for early intervention.  -RE     LTG 1 Progress New  -RE     LTG 2 Patient's bioimpedance score to remain below 3.6 for decreased risk of stage II lymphedema.  -RE     LTG 2 Progress New  -RE        Time Calculation    OT Goal Re-Cert Due Date 07/16/24  -RE               User Key  (r) = Recorded By, (t) = Taken By, (c) = Cosigned By      Initials Name Provider Type    RE Yesi Pedroza OTR Occupational Therapist                     OT Assessment/Plan       Row Name 04/16/24 1254          OT Assessment    Assessment Comments Patient presents to Occupational Therapy preoperatively for planned breast cancer surgery to include left mastectomy with planned sentinel lymph node biopsy.  Patient is scheduled for surgery on 5/16/2024.  Baseline range of motion postural and bioimpedance measurements were taken today to be compared to measurements retaken postoperatively.  At that time, any reduced movement, decline in function or postural issues will be addressed with skilled therapy and new goals will be established.  Patient's strength and active range of motion are within  functional limits.  Patient's L-Dex value today was -2.9 which is well within normal limits.  Personal risk factors for lymphedema postoperatively for the left upper extremity and trunk quadrant were also assessed today and basic lymphedema precautions were discussed.  A more detailed discussion regarding personal risk factors will take place postoperatively once the number of nodes removed and the plan for further medical care is known.  -RE     Please refer to paper survey for additional self-reported information No  -RE     OT Diagnosis At risk for lymphedema; preoperative evaluation  -RE     OT Rehab Potential Good  -RE     Patient/caregiver participated in establishment of treatment plan and goals Yes  -RE     Patient would benefit from skilled therapy intervention Yes  -RE        OT Plan    OT Frequency Other (comment)  -RE     Predicted Duration of Therapy Intervention (OT) Follow-up 4 to 6 weeks postoperatively and then every 3 months for bioimpedance  -RE     Planned CPT's? OT EVAL LOW COMPLEXITY: 74722;OT SELF CARE/MGMT/TRAIN 15 MIN: 63448;OT BIS XTRACELL FLUID ANALYSIS: 80364  -RE     Planned Therapy Interventions (Optional Details) home exercise program;patient/family education;other (see comments)  -RE     OT Plan Comments Follow-up 4 to 6 weeks postop  -RE               User Key  (r) = Recorded By, (t) = Taken By, (c) = Cosigned By      Initials Name Provider Type    RE Yesi Pedroza OTR Occupational Therapist                    Outcome Measure Options: Quick DASH  Quick DASH  Open a tight or new jar.: No Difficulty  Do heavy household chores (e.g., wash walls, wash floors): No Difficulty  Carry a shopping bag or briefcase: No Difficulty  Wash your back: Mild Difficulty  Use a knife to cut food: No Difficulty  Recreational activities in which you take some force or impact through your arm, should or hand (e.g. golf, hammering, tennis, etc.): Mild Difficulty  During the past week, to what extent has  your arm, shoulder, or hand problem interfered with your normal social activites with family, friends, neighbors or groups?: Not at all  During the past week, were you limited in your work or other regular daily activities as a result of your arm, shoulder or hand problem?: Not limited at all  Arm, Shoulder, or hand pain: Mild  Tingling (pins and needles) in your arm, shoulder, or hand: None  During the past week, how much difficulty have you had sleeping because of the pain in your arm, shoulder or hand?: No difficulty  Number of Questions Answered: 11  Quick DASH Score: 6.82         Time Calculation:   OT Start Time: 0900  OT Stop Time: 0940  OT Time Calculation (min): 40 min  Untimed Charges  OT Eval/Re-eval Minutes: 30  87200 - OT Bioimpedence Rx Minutes: 10  Total Minutes  Untimed Charges Total Minutes: 40   Total Minutes: 40     Therapy Charges for Today       Code Description Service Date Service Provider Modifiers Qty    25477106661 HC PT BIS XTRACELL FLUID ANALYSIS 4/16/2024 Yesi Pedroza OTR  1    11095294890 HC OT EVAL LOW COMPLEXITY 2 4/16/2024 Yesi Pedroza OTR GO 1    33068719642 HC OT BIS XTRACELL FLUID ANALYSIS 4/16/2024 Yesi Pedroza OTR GO 1              Dictated utilizing Dragon dictation:  Much of this encounter note is an electronic transcription/translation of spoken language to printed text. The electronic translation of spoken language may permit erroneous, or at times, nonsensical words or phrases to be inadvertently transcribed; Although I have reviewed the note for such errors, some may still exist.        KARAN Jeong  4/16/2024

## 2024-04-17 ENCOUNTER — PREP FOR SURGERY (OUTPATIENT)
Dept: OTHER | Facility: HOSPITAL | Age: 64
End: 2024-04-17

## 2024-04-17 ENCOUNTER — OFFICE VISIT (OUTPATIENT)
Dept: SURGERY | Facility: CLINIC | Age: 64
End: 2024-04-17
Payer: COMMERCIAL

## 2024-04-17 VITALS
WEIGHT: 179 LBS | BODY MASS INDEX: 30.56 KG/M2 | DIASTOLIC BLOOD PRESSURE: 62 MMHG | SYSTOLIC BLOOD PRESSURE: 122 MMHG | HEART RATE: 98 BPM | RESPIRATION RATE: 18 BRPM | HEIGHT: 64 IN | OXYGEN SATURATION: 96 %

## 2024-04-17 DIAGNOSIS — C50.812 MALIGNANT NEOPLASM OF OVERLAPPING SITES OF LEFT BREAST IN FEMALE, ESTROGEN RECEPTOR POSITIVE: Primary | ICD-10-CM

## 2024-04-17 DIAGNOSIS — Z17.0 MALIGNANT NEOPLASM OF OVERLAPPING SITES OF LEFT BREAST IN FEMALE, ESTROGEN RECEPTOR POSITIVE: Primary | ICD-10-CM

## 2024-04-17 PROCEDURE — 99213 OFFICE O/P EST LOW 20 MIN: CPT | Performed by: STUDENT IN AN ORGANIZED HEALTH CARE EDUCATION/TRAINING PROGRAM

## 2024-04-17 RX ORDER — DIAZEPAM 5 MG/1
10 TABLET ORAL ONCE
Status: CANCELLED | OUTPATIENT
Start: 2024-04-30 | End: 2024-04-18

## 2024-04-17 RX ORDER — NYSTATIN 100000 U/G
CREAM TOPICAL
COMMUNITY
Start: 2024-04-16

## 2024-04-17 NOTE — H&P (VIEW-ONLY)
[facility]     Referring Provider: Dorothy Dean MD     Chief complaint: newly diagnosed breast cancer    Subjective    4/5/24 HPI: Ms. Colleen Yanes is a 63 y.o. yo woman, seen at the request of Dr. Dean for a new diagnosis of left breast cancer.      This was initially detected as an imaging abnormality. Her work-up is detailed in the oncologic history below.   She denies any breast lumps, pain, skin changes, or nipple discharge.    She previously underwent 1 prior breast biopsy in 2020, left breast - reportedly benign pathology. No changes to her over all health. Since this diagnosis has been extreemly anxious, her PCP has added ativan to help with acute stress.    She reports she has a pertinent PMH of DM not on insulin, managed with mounjaro (A1c in January was 9) and metformin, depression, HTN, HLD, GERD.     She was joined today in clinic by her . They all participated in the discussion, after her examination, and she gave her consent for them to participate.  She gave consent for her  to be present during her examination and participate in the discussion.     4/17/24 - Met with PRS - Dr. Jaswinder Barbour. She is a candidate for unilateral tissue expander placement after mastectomy. No further questions. A1c improving but remains elevated at 8.8    Oncology/Hematology History   Malignant neoplasm of overlapping sites of left breast in female, estrogen receptor positive   3/22/2024 Cancer Staged    Staging form: Breast, AJCC 8th Edition  - Clinical stage from 3/22/2024: Stage IA (cT1c, cN0, cM0, G3, ER+, TX+, HER2-) - Signed by Riddhi Amezcua MD on 4/5/2024 4/5/2024 Initial Diagnosis    Malignant neoplasm of overlapping sites of left breast in female, estrogen receptor positive         Review of Systems   Constitutional:  Negative for appetite change, fatigue and fever.   Respiratory:  Negative for cough and shortness of breath.    Gastrointestinal:  Positive for constipation. Negative  for abdominal distention, diarrhea and nausea.   Musculoskeletal:  Negative for arthralgias and back pain.   Neurological:  Negative for dizziness, headaches and speech difficulty.   Psychiatric/Behavioral:  Positive for depression and sleep disturbance. Negative for decreased concentration.        Medications:    Current Outpatient Medications:     Alpha-Lipoic Acid 600 MG capsule, Take  by mouth. (Patient not taking: Reported on 3/6/2024), Disp: , Rfl:     CALCIUM-MAG-VIT C-VIT D PO, Take  by mouth. (Patient not taking: Reported on 3/6/2024), Disp: , Rfl:     clobetasol (TEMOVATE) 0.05 % ointment, Apply 1 application topically to the appropriate area as directed 2 (Two) Times a Week., Disp: 60 g, Rfl: 2    esomeprazole (nexIUM) 40 MG capsule, TK 1 C PO QD, Disp: , Rfl: 2    fluorouracil (EFUDEX) 5 % cream, , Disp: , Rfl:     hydrOXYzine (ATARAX) 25 MG tablet, Take 1 tablet by mouth 3 (Three) Times a Day As Needed for Anxiety., Disp: 30 tablet, Rfl: 0    INVOKANA 300 MG tablet, TK 1 T PO QAM B NATE, Disp: , Rfl: 1    Jardiance 25 MG tablet tablet, Take 1 tablet by mouth Daily., Disp: , Rfl:     LORazepam (ATIVAN) 0.5 MG tablet, Take 1 tablet by mouth Every 8 (Eight) Hours As Needed for Anxiety., Disp: , Rfl:     losartan (COZAAR) 25 MG tablet, Take 1 tablet by mouth Daily., Disp: , Rfl: 1    metFORMIN ER (GLUCOPHAGE-XR) 500 MG 24 hr tablet, TK 2 TS PO BID WC, Disp: , Rfl: 3    metoprolol succinate XL (TOPROL-XL) 25 MG 24 hr tablet, TK 1 T PO  QD, Disp: , Rfl: 1    OZEMPIC 1 MG/DOSE solution pen-injector, INJECT 1 MG INTO THE SKIN Q 7 DAYS (Patient not taking: Reported on 3/6/2024), Disp: , Rfl: 3    pramipexole (MIRAPEX) 0.5 MG tablet, Take 1 tablet by mouth Daily., Disp: , Rfl:     pravastatin (PRAVACHOL) 40 MG tablet, TK 1 T PO D, Disp: , Rfl: 1    rosuvastatin (CRESTOR) 40 MG tablet, Take 1 tablet by mouth Daily., Disp: , Rfl:     Tirzepatide (Mounjaro) 12.5 MG/0.5ML solution pen-injector pen, Inject 0.5 mL under  the skin into the appropriate area as directed 1 (One) Time Per Week., Disp: , Rfl:     venlafaxine XR (EFFEXOR-XR) 75 MG 24 hr capsule, TK 1 C PO D, Disp: , Rfl: 1    vitamin D3 (vitamin d) 125 MCG (5000 UT) capsule capsule, Take  by mouth., Disp: , Rfl:     Allergies:  Allergies   Allergen Reactions    Lisinopril Other (See Comments)     cough  cough  cough    Simvastatin Other (See Comments)     myalgias  myalgias  myalgias    Sulfa Antibiotics Itching and Rash    Sulfasalazine Itching and Rash       Medical history:  Past Medical History:   Diagnosis Date    Depression     Diabetes mellitus     GERD (gastroesophageal reflux disease)     Heart palpitations     Hyperlipidemia     Restless leg        Surgical History:  Past Surgical History:   Procedure Laterality Date    BREAST BIOPSY Left      AND     BREAST CYST ASPIRATION Left      SECTION      DILATATION AND CURETTAGE      US GUIDED CYST ASPIRATION BREAST N/A 2024       Family History:  Family History   Problem Relation Age of Onset    Breast cancer Mother     Ovarian cancer Neg Hx     Uterine cancer Neg Hx     Colon cancer Neg Hx        Family Cancer History: relationship - (age of diagnosis/current age or age at death)  Breast: Mother dx age 78/  age 88 from old age    Ovarian: N/A  Colon: N/A  Pancreas: N/A  Prostate: N/A    Social History:   Social History     Socioeconomic History    Marital status:    Tobacco Use    Smoking status: Former     Passive exposure: Never    Smokeless tobacco: Never    Tobacco comments:     quit age 35   Vaping Use    Vaping status: Never Used   Substance and Sexual Activity    Alcohol use: Yes     Comment: occassional    Drug use: Never    Sexual activity: Yes     Birth control/protection: Post-menopausal       She lives her .  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         GYNECOLOGIC HISTORY:   G: 3. P: 3. AB: 0.  Last menstrual period: 46-47  Age  at menarche: 12  Age at first childbirth: 27  Lactation/How long: N/A  Age at menopause: 46-47  Total years of oral contraceptive use: 30+ years  Total years of hormone replacement therapy: hormone path only used once or twice.      Objective   PHYSICAL EXAMINATION:   There were no vitals filed for this visit.    Examination chaperoned by Chrissy.  ECOG 0 - Asymptomatic  General: NAD, well appearing  Psych: a&o x 3, normal mood and affect  Eyes: EOMI, no scleral icterus  ENMT: neck supple without masses or thyromegaly, mucus membranes moist  Resp: normal effort  CV: RRR, no edema   GI: soft, NT, ND  MSK: normal gait, normal ROM in bilateral shoulders  Lymph nodes: no cervical, supraclavicular or axillary lymphadenopathy    Previous  exam - deferred on exam today  Breast: symmetric, mild ptosis, bra size 42c  Right:  No visible abnormalities on inspection while seated, with arms raised or hands on hips. No masses, skin changes, or nipple abnormalities.  Left: significant bruise to 6 oclock, 12 oclock with hematoma prox 2 cm in size. No skin involvement, skin is freely mobile over bruising. No visible abnormalities on inspection while seated, with arms raised or hands on hips. No masses, skin changes, or nipple abnormalities.      Previous IMAGING: I have independently reviewed her imaging:   3/6/2024 bilateral screening mammogram ([facility])  Scattered areas of fibroglandular density.  Isodense focal asymmetry measuring 6 mm in the middle one third inferior region of the left breast.  No suspicious calcifications or areas of architectural distortion.  Biopsy clip noted, unchanged.  Right breast no suspicious masses, significant calcifications or other abnormalities are seen.  Impression: Focal asymmetry in the left breast requires additional evaluation.  Diagnostic mammogram and limited breast ultrasound is recommended.  BI-RADS 0    3/20/2024 left diagnostic mammogram ([facility])  Scattered areas of fibroglandular density.  Finding  1: Additional evaluation performed for focal asymmetry seen in the left breast inferior.  On present, there is an isodense irregular mass measuring 11 mm with spiculated margins and associated calcifications in the middle one third region of the left breast at 6:00.  Finding 3: There is a focal asymmetry measuring 10 mm with associated calcifications seen in the posterior one third outer region of the left breast.  Best seen on LS CC projection.  The left breast ultrasound  Finding 1: Ultrasound demonstrates an irregular nonparallel hypoechoic mass with spiculated margins measuring 11 x 7 x 8 mm in the middle one third region of the left breast at 6:00 2 cm from the nipple.  Echogenic foci within the mass are consistent with calcifications seen mammographically.  Finding 2: There is an oval hypoechoic mass with indistinct margins measuring 5 x 3 x 5 mm in the left breast.  This is an incidental finding.  Impression:  Finding 1: Mass in the middle one third region of the left breast at 6:00 2 cm from the nipple is suspicious.  Ultrasound-guided biopsy is recommended.  Finding 2: Mass in the left breast is suspicious ultrasound-guided cyst aspiration is recommended, followed by ultrasound-guided biopsy if aspiration is unsuccessful.  Finding 3: Focal asymmetry in the posterior one third upper outer region of the left breast is suspicious.  Stereotactic biopsy is recommended.  However this will be reevaluated on the day of biopsy with additional diagnostic views and repeat ultrasound to determine the best biopsy approach if still indicated.  BI-RADS 4C    3/22/2024 ultrasound-guided cyst aspiration  Left breast cyst was identified at 3:00.  Approximately 1/4 cc of yellow fluid was removed.  This walls of the cyst collapsed.  With complete resolution of the cyst.  Impression successful cyst aspiration    3/22/2024 left ultrasound-guided biopsy (6:00)  The left breast at 6:00 was imaged and the mass was localized.  Using  a 12-gauge core needle vacuum-assisted device 16 cores were obtained.  Using ultrasound guidance a mini cork tissue marker was placed at the biopsy site.  Hemostasis was achieved.  2 view postprocedure mammogram confirmed the clip in the expected position.  There is a 5 to 6 cm postbiopsy hematoma.  Pathology returned as invasive ductal carcinoma grade 2.  Pathology is malignant and concordant.    3/22/2024 left ultrasound-guided biopsy (12:00)  Left breast at 12:00 was imaged and the mass was localized with calcifications at 12-gauge vacuum-assisted device was utilized and 13 cores were obtained.  A Top-Hat shaped tissue marker was placed on the biopsy site.  Hemostasis was achieved.  Follow-up mammogram showed Top-Hat within the expected position residual calcifications are seen but difficult to assess.  Specimen radiograph confirmed sampling of calcifications.  5 to 6 cm postbiopsy hematoma was noted.  Pathology returned as invasive lobular carcinoma, ductal carcinoma in situ, atypical lobular hyperplasia.  Pathology is malignant and concordant.    3/27/24 Breast MRI  FINDINGS: Scattered fibroglandular tissue is seen throughout both  breast. Mild background parenchymal enhancement of both breasts is  noted.     In the left breast at the 12 o'clock position centered on the order of 8  cm from the nipple there is an irregular enhancing mass that measures  1.3 cm in anterior to posterior dimension, 1.2 cm in the  superior-inferior dimension and 1 cm in the medial to lateral dimension.  A centrally located signal void is noted within the mass. This  corresponds to the biopsy-proven malignancy with the internal top hat  shaped metallic clip.     In the middle third of the left breast at the 6 o'clock position  centered on the order of 4.5 cm from the nipple there is an irregular  enhancing mass that measures on the order of 0.8 cm in the anterior to  posterior dimension, 0.7 cm in the superior to inferior dimension  and  1.2 cm in the medial to lateral dimension. A centrally located signal  void is noted. This corresponds to the biopsy-proven malignancy with the  internal mini cork shaped metallic clip.     No other areas of suspicious enhancement or morphology are seen in the  left breast. I see no evidence for abnormal skin, nipple or chest wall  enhancement of the left breast and there is no evidence for left  axillary or internal mammary chain adenopathy.     There are no areas of suspicious enhancement or morphology in the right  breast. I see no evidence for abnormal skin, nipple or chest wall  enhancement of the right breast and there is no evidence for right  axillary or internal mammary chain adenopathy.     IMPRESSION:  1. Biopsy-proven malignancy in the left breast at the 12 o'clock  position in the posterior one third that measures on the order of 1.3 cm  in greatest dimension with the internal top hat shaped metallic clip. No  other evidence for left axillary adenopathy is appreciated.     2. Biopsy-proven malignancy in the left breast at the 6 o'clock position  in the middle third that measures on the order of 1.2 cm in greatest  dimension with the internal mini cork shaped metallic clip.      3. There are no findings suspicious for malignancy in the right breast.     BI-RADS category 6: Known malignancy.    PATHOLOGY:   1. Left Breast, 6:00, Core Needle Biopsy for a Mass with Calcifications:               A. INVASIVE DUCTAL CARCINOMA, Moderately Differentiated; [place] Histologic Grade II/III       (tubule score = 3, nuclear score = 3, mitoses score = 1), measuring at least 7 mm.               B. Negative for in situ carcinoma in this sample.               C. Negative for lymphovascular space invasion.               D. Calcifications associated with invasive tumor.  E. See biomarker template #1 and comment.     2. Left Breast, 12:00, Core Needle Biopsy for a Mass with Calcifications:               A. INVASIVE  LOBULAR CARCINOMA, Moderately Differentiated; [place] Histologic Grade II/III       (tubule score = 3, nuclear score = 2, mitoses score = 1), measuring at least 5 mm.               B. Low-grade ductal carcinoma in situ (DCIS), solid and cribriform types with associated calcifications.                   C. Atypical lobular hyperplasia.               D. Negative for lymphovascular space invasion.               E. Calcifications also associated with invasive tumor.               F. See biomarker template #2 and comment.    Estrogen Receptor (ER) Status  Positive (greater than 10% of cells demonstrate nuclear positivity)   Percentage of Cells with Nuclear Positivity  %   Average Intensity of Staining  Strong   Test Type  Food and Drug Administration (FDA) cleared (test / vendor): [place]   Primary Antibody  SP1   Scoring System  Yudith   Proportion Score  5   Intensity Score  3   Total Yudith Score  8   Test(s) Performed     Progesterone Receptor (PgR) Status  Positive   Percentage of Cells with Nuclear Positivity  81-90%   Average Intensity of Staining  Moderate   Test Type  Food and Drug Administration (FDA) cleared (test / vendor): [place]   Primary Antibody  1E2   Scoring System  Yudith   Proportion Score  5   Intensity Score  2   Total Yudith Score  7   Test(s) Performed     HER2 by Immunohistochemistry  Negative (Score 1+)     Estrogen Receptor (ER) Status  Positive (greater than 10% of cells demonstrate nuclear positivity)   Percentage of Cells with Nuclear Positivity  %   Average Intensity of Staining  Strong   Test Type  Food and Drug Administration (FDA) cleared (test / vendor): [place]   Primary Antibody  SP1   Scoring System  Yudith   Proportion Score  5   Intensity Score  3   Total Yudith Score  8   Test(s) Performed     Progesterone Receptor (PgR) Status  Positive   Percentage of Cells with Nuclear Positivity  %   Average Intensity of Staining  Strong   Test Type  Food and Drug  Administration (FDA) cleared (test / vendor): [place]   Primary Antibody  1E2   Scoring System  Yudith   Proportion Score  5   Intensity Score  3   Total Yudith Score  8   Test(s) Performed     HER2 by Immunohistochemistry  Negative (Score 1+)       Assessment & Plan   Assessment:  63 y.o. F with a new diagnosis of left breast multifocal: Invasive ductal and invasive lobular carcinoma grade 2/3, ER/ID +, Her2 -; [id number], anatomic stage IA, prognostic stage IA.      Discussion:  I had an extensive discussion with the patient and her family about the nature of her breast cancer diagnosis. We reviewed the components of breast tissue including ducts and lobules. We reviewed her pathology report in detail. We reviewed breast cancer histology, including stage, grade, ER/ID receptors, HER2 receptors and how this applies to her diagnosis. We reviewed the basics of systemic and local/regional management of breast cancer.      We discussed that in her case, systemic treatment would involve endocrine therapy and possibly chemotherapy. She will be referred to medical oncology postoperatively to discuss this further.     We reviewed potential surgical treatments to include partial mastectomy, mastectomy, sentinel lymph node biopsy and axillary node dissection and discussed the rationale associated with each approach. Regarding radiation therapy, we discussed that radiation is indicated in all cases of breast conservation and in only limited circumstances following mastectomy. Partial mastectomy with radiation and mastectomy were explained with option of reconstruction.  The patient was informed that from a survival standpoint, both procedures are oncologically equivalent.  We discussed that the primary goal of adjuvant radiation is to decrease the likelihood of local recurrence.     In her case she is not a candidate for breast conservation due to multifocal disease at opposite sides of her breast. Mastectomy was discussed  including surgical incision planning and reconstruction options. She is considering bilateral surgery for peace of mind, her cancer diagnosis has been extremely stressful    I explained that most breast cancer is not hereditary, that I do not believe this plays a role in her case, and that she does not meet NCCN criteria for genetic testing. I would not recommend a bilateral mastectomy, since her risk of a second primary cancer is relatively low and endocrine therapy will further reduce her risk. She is also at an elevated risk of wound healing issues due to her diabetes and elevated A1c.    I described additional risks and potential complications associated with surgery, including, but not limited to, bleeding, infection, deformity/poor cosmetic result, chronic pain, numbness, seroma, hematoma, deep venous thrombosis, skin flap necrosis, disease recurrence and the possibility of requiring additional surgery. We also discussed other treatment options including the option of not undergoing any surgical treatment with a palliative rather than curative intent and the risks associated with this including disease progression. She expressed an understanding of these factors and wished to proceed.    We discussed axillary staging. I described the procedure for sentinel lymph node biopsy in detail, including the preoperative injection of a radiotracer and intraoperative injection of lymphazurin blue dye. I explained that this is a mapping test and not a cancer test, that all of the lymph nodes containing these dyes will be removed for complete testing by pathology, and that the results could impact the decision for adjuvant treatment or additional surgery.    I described additional risks and potential complications associated with surgery, including, but not limited to, bleeding, infection, complications related to blue dye, lymphedema, deformity/poor cosmetic result, chronic pain, neurovascular injury, numbness, seroma,  hematoma, deep venous thrombosis, skin flap necrosis, disease recurrence and the possibility of requiring additional surgery. We also discussed other treatment options including the option of not undergoing any surgical treatment and the risks associated with this including disease progression. She expressed an understanding of these factors and wished to proceed.      Plan:  A multidisciplinary plan has been formulated for the patient:      # Breast Surgical Oncology:  - plan for at least left mastectomy and sentinel lymph node biopsy.  -Plastic surgery Dr Barbour planning TE based recon  -Consents completed and signed. Discussed the risks and benefits of left mastectomy, sentinel lymph node biopsy at length including estimated time for procedure, postoperative recovery, and postoperative instructions. Discussed the risks to include pain, bleeding, infection, nerve injury, numbness, seroma, skin complications including necrosis, breast asymmetry, need for re-excision, lymphedema, possible need for axillary dissection at time of surgery or at a later date, lymphocele, and scar.  Patient appears to understand and wishes to proceed.  All questions were answered.    # Medical Oncology:  - oncotype candidate  - will need 5-10 years of hormone blockade, for invasive cancer and ALH  -Will refer postoperatively.    #  Radiation Oncology:  -Will refer postoperatively.    # Nurse Navigation  - Referral made and following  - referral to Behavioral health placed    # Lymphedema clinic  - Referral was placed, following    # Genetic Testing   - not indicated per NCCN    # Breast Imaging  - Next Screening mammogram due: 3/2025      Riddhi Amezcua MD  Breast Surgical Oncology    I spent 20 minutes caring for Ms Yanes on this date of service. This time includes time spent by me in the following activities: preparing for the visit, reviewing tests, obtaining and/or reviewing a separately obtained history, performing a medically  appropriate examination and/or evaluation , counseling and educating the patient/family/caregiver, referring and communicating with other health care professionals , documenting information in the medical record, independently interpreting results and communicating that information with the patient/family/caregiver, and care coordination.      CC:  Dr. Jermaine Schuster

## 2024-04-17 NOTE — PROGRESS NOTES
[facility]     Referring Provider: Dorothy Dean MD     Chief complaint: newly diagnosed breast cancer    Subjective    4/5/24 HPI: Ms. Colleen Yanes is a 63 y.o. yo woman, seen at the request of Dr. Dean for a new diagnosis of left breast cancer.      This was initially detected as an imaging abnormality. Her work-up is detailed in the oncologic history below.   She denies any breast lumps, pain, skin changes, or nipple discharge.    She previously underwent 1 prior breast biopsy in 2020, left breast - reportedly benign pathology. No changes to her over all health. Since this diagnosis has been extreemly anxious, her PCP has added ativan to help with acute stress.    She reports she has a pertinent PMH of DM not on insulin, managed with mounjaro (A1c in January was 9) and metformin, depression, HTN, HLD, GERD.     She was joined today in clinic by her . They all participated in the discussion, after her examination, and she gave her consent for them to participate.  She gave consent for her  to be present during her examination and participate in the discussion.     4/17/24 - Met with PRS - Dr. Jaswinder Barbour. She is a candidate for unilateral tissue expander placement after mastectomy. No further questions. A1c improving but remains elevated at 8.8    Oncology/Hematology History   Malignant neoplasm of overlapping sites of left breast in female, estrogen receptor positive   3/22/2024 Cancer Staged    Staging form: Breast, AJCC 8th Edition  - Clinical stage from 3/22/2024: Stage IA (cT1c, cN0, cM0, G3, ER+, NM+, HER2-) - Signed by Riddhi Amezcua MD on 4/5/2024 4/5/2024 Initial Diagnosis    Malignant neoplasm of overlapping sites of left breast in female, estrogen receptor positive         Review of Systems   Constitutional:  Negative for appetite change, fatigue and fever.   Respiratory:  Negative for cough and shortness of breath.    Gastrointestinal:  Positive for constipation. Negative  for abdominal distention, diarrhea and nausea.   Musculoskeletal:  Negative for arthralgias and back pain.   Neurological:  Negative for dizziness, headaches and speech difficulty.   Psychiatric/Behavioral:  Positive for depression and sleep disturbance. Negative for decreased concentration.        Medications:    Current Outpatient Medications:     Alpha-Lipoic Acid 600 MG capsule, Take  by mouth. (Patient not taking: Reported on 3/6/2024), Disp: , Rfl:     CALCIUM-MAG-VIT C-VIT D PO, Take  by mouth. (Patient not taking: Reported on 3/6/2024), Disp: , Rfl:     clobetasol (TEMOVATE) 0.05 % ointment, Apply 1 application topically to the appropriate area as directed 2 (Two) Times a Week., Disp: 60 g, Rfl: 2    esomeprazole (nexIUM) 40 MG capsule, TK 1 C PO QD, Disp: , Rfl: 2    fluorouracil (EFUDEX) 5 % cream, , Disp: , Rfl:     hydrOXYzine (ATARAX) 25 MG tablet, Take 1 tablet by mouth 3 (Three) Times a Day As Needed for Anxiety., Disp: 30 tablet, Rfl: 0    INVOKANA 300 MG tablet, TK 1 T PO QAM B NATE, Disp: , Rfl: 1    Jardiance 25 MG tablet tablet, Take 1 tablet by mouth Daily., Disp: , Rfl:     LORazepam (ATIVAN) 0.5 MG tablet, Take 1 tablet by mouth Every 8 (Eight) Hours As Needed for Anxiety., Disp: , Rfl:     losartan (COZAAR) 25 MG tablet, Take 1 tablet by mouth Daily., Disp: , Rfl: 1    metFORMIN ER (GLUCOPHAGE-XR) 500 MG 24 hr tablet, TK 2 TS PO BID WC, Disp: , Rfl: 3    metoprolol succinate XL (TOPROL-XL) 25 MG 24 hr tablet, TK 1 T PO  QD, Disp: , Rfl: 1    OZEMPIC 1 MG/DOSE solution pen-injector, INJECT 1 MG INTO THE SKIN Q 7 DAYS (Patient not taking: Reported on 3/6/2024), Disp: , Rfl: 3    pramipexole (MIRAPEX) 0.5 MG tablet, Take 1 tablet by mouth Daily., Disp: , Rfl:     pravastatin (PRAVACHOL) 40 MG tablet, TK 1 T PO D, Disp: , Rfl: 1    rosuvastatin (CRESTOR) 40 MG tablet, Take 1 tablet by mouth Daily., Disp: , Rfl:     Tirzepatide (Mounjaro) 12.5 MG/0.5ML solution pen-injector pen, Inject 0.5 mL under  the skin into the appropriate area as directed 1 (One) Time Per Week., Disp: , Rfl:     venlafaxine XR (EFFEXOR-XR) 75 MG 24 hr capsule, TK 1 C PO D, Disp: , Rfl: 1    vitamin D3 (vitamin d) 125 MCG (5000 UT) capsule capsule, Take  by mouth., Disp: , Rfl:     Allergies:  Allergies   Allergen Reactions    Lisinopril Other (See Comments)     cough  cough  cough    Simvastatin Other (See Comments)     myalgias  myalgias  myalgias    Sulfa Antibiotics Itching and Rash    Sulfasalazine Itching and Rash       Medical history:  Past Medical History:   Diagnosis Date    Depression     Diabetes mellitus     GERD (gastroesophageal reflux disease)     Heart palpitations     Hyperlipidemia     Restless leg        Surgical History:  Past Surgical History:   Procedure Laterality Date    BREAST BIOPSY Left      AND     BREAST CYST ASPIRATION Left      SECTION      DILATATION AND CURETTAGE      US GUIDED CYST ASPIRATION BREAST N/A 2024       Family History:  Family History   Problem Relation Age of Onset    Breast cancer Mother     Ovarian cancer Neg Hx     Uterine cancer Neg Hx     Colon cancer Neg Hx        Family Cancer History: relationship - (age of diagnosis/current age or age at death)  Breast: Mother dx age 78/  age 88 from old age    Ovarian: N/A  Colon: N/A  Pancreas: N/A  Prostate: N/A    Social History:   Social History     Socioeconomic History    Marital status:    Tobacco Use    Smoking status: Former     Passive exposure: Never    Smokeless tobacco: Never    Tobacco comments:     quit age 35   Vaping Use    Vaping status: Never Used   Substance and Sexual Activity    Alcohol use: Yes     Comment: occassional    Drug use: Never    Sexual activity: Yes     Birth control/protection: Post-menopausal       She lives her .  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         GYNECOLOGIC HISTORY:   G: 3. P: 3. AB: 0.  Last menstrual period: 46-47  Age  at menarche: 12  Age at first childbirth: 27  Lactation/How long: N/A  Age at menopause: [age]  Total years of oral contraceptive use: 30+ years  Total years of hormone replacement therapy: hormone path only used once or twice.      Objective   PHYSICAL EXAMINATION:   There were no vitals filed for this visit.    Examination chaperoned by Chrissy.  ECOG 0 - Asymptomatic  General: NAD, well appearing  Psych: a&o x 3, normal mood and affect  Eyes: EOMI, no scleral icterus  ENMT: neck supple without masses or thyromegaly, mucus membranes moist  Resp: normal effort  CV: RRR, no edema   GI: soft, NT, ND  MSK: normal gait, normal ROM in bilateral shoulders  Lymph nodes: no cervical, supraclavicular or axillary lymphadenopathy    Previous  exam - deferred on exam today  Breast: symmetric, mild ptosis, bra size 42c  Right:  No visible abnormalities on inspection while seated, with arms raised or hands on hips. No masses, skin changes, or nipple abnormalities.  Left: significant bruise to 6 oclock, 12 oclock with hematoma prox 2 cm in size. No skin involvement, skin is freely mobile over bruising. No visible abnormalities on inspection while seated, with arms raised or hands on hips. No masses, skin changes, or nipple abnormalities.      Previous IMAGING: I have independently reviewed her imaging:   3/6/2024 bilateral screening mammogram ([facility])  Scattered areas of fibroglandular density.  Isodense focal asymmetry measuring 6 mm in the middle one third inferior region of the left breast.  No suspicious calcifications or areas of architectural distortion.  Biopsy clip noted, unchanged.  Right breast no suspicious masses, significant calcifications or other abnormalities are seen.  Impression: Focal asymmetry in the left breast requires additional evaluation.  Diagnostic mammogram and limited breast ultrasound is recommended.  BI-RADS 0    3/20/2024 left diagnostic mammogram ([facility])  Scattered areas of fibroglandular density.  Finding  1: Additional evaluation performed for focal asymmetry seen in the left breast inferior.  On present, there is an isodense irregular mass measuring 11 mm with spiculated margins and associated calcifications in the middle one third region of the left breast at 6:00.  Finding 3: There is a focal asymmetry measuring 10 mm with associated calcifications seen in the posterior one third outer region of the left breast.  Best seen on LS CC projection.  The left breast ultrasound  Finding 1: Ultrasound demonstrates an irregular nonparallel hypoechoic mass with spiculated margins measuring 11 x 7 x 8 mm in the middle one third region of the left breast at 6:00 2 cm from the nipple.  Echogenic foci within the mass are consistent with calcifications seen mammographically.  Finding 2: There is an oval hypoechoic mass with indistinct margins measuring 5 x 3 x 5 mm in the left breast.  This is an incidental finding.  Impression:  Finding 1: Mass in the middle one third region of the left breast at 6:00 2 cm from the nipple is suspicious.  Ultrasound-guided biopsy is recommended.  Finding 2: Mass in the left breast is suspicious ultrasound-guided cyst aspiration is recommended, followed by ultrasound-guided biopsy if aspiration is unsuccessful.  Finding 3: Focal asymmetry in the posterior one third upper outer region of the left breast is suspicious.  Stereotactic biopsy is recommended.  However this will be reevaluated on the day of biopsy with additional diagnostic views and repeat ultrasound to determine the best biopsy approach if still indicated.  BI-RADS 4C    3/22/2024 ultrasound-guided cyst aspiration  Left breast cyst was identified at 3:00.  Approximately 1/4 cc of yellow fluid was removed.  This walls of the cyst collapsed.  With complete resolution of the cyst.  Impression successful cyst aspiration    3/22/2024 left ultrasound-guided biopsy (6:00)  The left breast at 6:00 was imaged and the mass was localized.  Using  a 12-gauge core needle vacuum-assisted device 16 cores were obtained.  Using ultrasound guidance a mini cork tissue marker was placed at the biopsy site.  Hemostasis was achieved.  2 view postprocedure mammogram confirmed the clip in the expected position.  There is a 5 to 6 cm postbiopsy hematoma.  Pathology returned as invasive ductal carcinoma grade 2.  Pathology is malignant and concordant.    3/22/2024 left ultrasound-guided biopsy (12:00)  Left breast at 12:00 was imaged and the mass was localized with calcifications at 12-gauge vacuum-assisted device was utilized and 13 cores were obtained.  A Top-Hat shaped tissue marker was placed on the biopsy site.  Hemostasis was achieved.  Follow-up mammogram showed Top-Hat within the expected position residual calcifications are seen but difficult to assess.  Specimen radiograph confirmed sampling of calcifications.  5 to 6 cm postbiopsy hematoma was noted.  Pathology returned as invasive lobular carcinoma, ductal carcinoma in situ, atypical lobular hyperplasia.  Pathology is malignant and concordant.    3/27/24 Breast MRI  FINDINGS: Scattered fibroglandular tissue is seen throughout both  breast. Mild background parenchymal enhancement of both breasts is  noted.     In the left breast at the 12 o'clock position centered on the order of 8  cm from the nipple there is an irregular enhancing mass that measures  1.3 cm in anterior to posterior dimension, 1.2 cm in the  superior-inferior dimension and 1 cm in the medial to lateral dimension.  A centrally located signal void is noted within the mass. This  corresponds to the biopsy-proven malignancy with the internal top hat  shaped metallic clip.     In the middle third of the left breast at the 6 o'clock position  centered on the order of 4.5 cm from the nipple there is an irregular  enhancing mass that measures on the order of 0.8 cm in the anterior to  posterior dimension, 0.7 cm in the superior to inferior dimension  and  1.2 cm in the medial to lateral dimension. A centrally located signal  void is noted. This corresponds to the biopsy-proven malignancy with the  internal mini cork shaped metallic clip.     No other areas of suspicious enhancement or morphology are seen in the  left breast. I see no evidence for abnormal skin, nipple or chest wall  enhancement of the left breast and there is no evidence for left  axillary or internal mammary chain adenopathy.     There are no areas of suspicious enhancement or morphology in the right  breast. I see no evidence for abnormal skin, nipple or chest wall  enhancement of the right breast and there is no evidence for right  axillary or internal mammary chain adenopathy.     IMPRESSION:  1. Biopsy-proven malignancy in the left breast at the 12 o'clock  position in the posterior one third that measures on the order of 1.3 cm  in greatest dimension with the internal top hat shaped metallic clip. No  other evidence for left axillary adenopathy is appreciated.     2. Biopsy-proven malignancy in the left breast at the 6 o'clock position  in the middle third that measures on the order of 1.2 cm in greatest  dimension with the internal mini cork shaped metallic clip.      3. There are no findings suspicious for malignancy in the right breast.     BI-RADS category 6: Known malignancy.    PATHOLOGY:   1. Left Breast, 6:00, Core Needle Biopsy for a Mass with Calcifications:               A. INVASIVE DUCTAL CARCINOMA, Moderately Differentiated; [place] Histologic Grade II/III       (tubule score = 3, nuclear score = 3, mitoses score = 1), measuring at least 7 mm.               B. Negative for in situ carcinoma in this sample.               C. Negative for lymphovascular space invasion.               D. Calcifications associated with invasive tumor.  E. See biomarker template #1 and comment.     2. Left Breast, 12:00, Core Needle Biopsy for a Mass with Calcifications:               A. INVASIVE  LOBULAR CARCINOMA, Moderately Differentiated; [place] Histologic Grade II/III       (tubule score = 3, nuclear score = 2, mitoses score = 1), measuring at least 5 mm.               B. Low-grade ductal carcinoma in situ (DCIS), solid and cribriform types with associated calcifications.                   C. Atypical lobular hyperplasia.               D. Negative for lymphovascular space invasion.               E. Calcifications also associated with invasive tumor.               F. See biomarker template #2 and comment.    Estrogen Receptor (ER) Status  Positive (greater than 10% of cells demonstrate nuclear positivity)   Percentage of Cells with Nuclear Positivity  %   Average Intensity of Staining  Strong   Test Type  Food and Drug Administration (FDA) cleared (test / vendor): [facility]   Primary Antibody  SP1   Scoring System  Yudith   Proportion Score  5   Intensity Score  3   Total Yudith Score  8   Test(s) Performed     Progesterone Receptor (PgR) Status  Positive   Percentage of Cells with Nuclear Positivity  81-90%   Average Intensity of Staining  Moderate   Test Type  Food and Drug Administration (FDA) cleared (test / vendor): [facility]   Primary Antibody  1E2   Scoring System  Yudith   Proportion Score  5   Intensity Score  2   Total Yudith Score  7   Test(s) Performed     HER2 by Immunohistochemistry  Negative (Score 1+)     Estrogen Receptor (ER) Status  Positive (greater than 10% of cells demonstrate nuclear positivity)   Percentage of Cells with Nuclear Positivity  %   Average Intensity of Staining  Strong   Test Type  Food and Drug Administration (FDA) cleared (test / vendor): [facility]   Primary Antibody  SP1   Scoring System  Yudith   Proportion Score  5   Intensity Score  3   Total Yudith Score  8   Test(s) Performed     Progesterone Receptor (PgR) Status  Positive   Percentage of Cells with Nuclear Positivity  %   Average Intensity of Staining  Strong   Test Type  Food and Drug  Administration (FDA) cleared (test / vendor): [facility]   Primary Antibody  1E2   Scoring System  Yudith   Proportion Score  5   Intensity Score  3   Total Yudith Score  8   Test(s) Performed     HER2 by Immunohistochemistry  Negative (Score 1+)       Assessment & Plan   Assessment:  63 y.o. F with a new diagnosis of left breast multifocal: Invasive ductal and invasive lobular carcinoma grade 2/3, ER/[place] +, Her2 -; [id number], anatomic stage IA, prognostic stage IA.      Discussion:  I had an extensive discussion with the patient and her family about the nature of her breast cancer diagnosis. We reviewed the components of breast tissue including ducts and lobules. We reviewed her pathology report in detail. We reviewed breast cancer histology, including stage, grade, ER/NJ receptors, HER2 receptors and how this applies to her diagnosis. We reviewed the basics of systemic and local/regional management of breast cancer.      We discussed that in her case, systemic treatment would involve endocrine therapy and possibly chemotherapy. She will be referred to medical oncology postoperatively to discuss this further.     We reviewed potential surgical treatments to include partial mastectomy, mastectomy, sentinel lymph node biopsy and axillary node dissection and discussed the rationale associated with each approach. Regarding radiation therapy, we discussed that radiation is indicated in all cases of breast conservation and in only limited circumstances following mastectomy. Partial mastectomy with radiation and mastectomy were explained with option of reconstruction.  The patient was informed that from a survival standpoint, both procedures are oncologically equivalent.  We discussed that the primary goal of adjuvant radiation is to decrease the likelihood of local recurrence.     In her case she is not a candidate for breast conservation due to multifocal disease at opposite sides of her breast. Mastectomy was discussed  including surgical incision planning and reconstruction options. She is considering bilateral surgery for peace of mind, her cancer diagnosis has been extremely stressful    I explained that most breast cancer is not hereditary, that I do not believe this plays a role in her case, and that she does not meet NCCN criteria for genetic testing. I would not recommend a bilateral mastectomy, since her risk of a second primary cancer is relatively low and endocrine therapy will further reduce her risk. She is also at an elevated risk of wound healing issues due to her diabetes and elevated A1c.    I described additional risks and potential complications associated with surgery, including, but not limited to, bleeding, infection, deformity/poor cosmetic result, chronic pain, numbness, seroma, hematoma, deep venous thrombosis, skin flap necrosis, disease recurrence and the possibility of requiring additional surgery. We also discussed other treatment options including the option of not undergoing any surgical treatment with a palliative rather than curative intent and the risks associated with this including disease progression. She expressed an understanding of these factors and wished to proceed.    We discussed axillary staging. I described the procedure for sentinel lymph node biopsy in detail, including the preoperative injection of a radiotracer and intraoperative injection of lymphazurin blue dye. I explained that this is a mapping test and not a cancer test, that all of the lymph nodes containing these dyes will be removed for complete testing by pathology, and that the results could impact the decision for adjuvant treatment or additional surgery.    I described additional risks and potential complications associated with surgery, including, but not limited to, bleeding, infection, complications related to blue dye, lymphedema, deformity/poor cosmetic result, chronic pain, neurovascular injury, numbness, seroma,  hematoma, deep venous thrombosis, skin flap necrosis, disease recurrence and the possibility of requiring additional surgery. We also discussed other treatment options including the option of not undergoing any surgical treatment and the risks associated with this including disease progression. She expressed an understanding of these factors and wished to proceed.      Plan:  A multidisciplinary plan has been formulated for the patient:      # Breast Surgical Oncology:  - plan for at least left mastectomy and sentinel lymph node biopsy.  -Plastic surgery Dr Barbour planning TE based recon  -Consents completed and signed. Discussed the risks and benefits of left mastectomy, sentinel lymph node biopsy at length including estimated time for procedure, postoperative recovery, and postoperative instructions. Discussed the risks to include pain, bleeding, infection, nerve injury, numbness, seroma, skin complications including necrosis, breast asymmetry, need for re-excision, lymphedema, possible need for axillary dissection at time of surgery or at a later date, lymphocele, and scar.  Patient appears to understand and wishes to proceed.  All questions were answered.    # Medical Oncology:  - oncotype candidate  - will need 5-10 years of hormone blockade, for invasive cancer and ALH  -Will refer postoperatively.    #  Radiation Oncology:  -Will refer postoperatively.    # Nurse Navigation  - Referral made and following  - referral to Behavioral health placed    # Lymphedema clinic  - Referral was placed, following    # Genetic Testing   - not indicated per NCCN    # Breast Imaging  - Next Screening mammogram due: 3/2025      Riddhi Amezcua MD  Breast Surgical Oncology    I spent 20 minutes caring for Ms Yanes on this date of service. This time includes time spent by me in the following activities: preparing for the visit, reviewing tests, obtaining and/or reviewing a separately obtained history, performing a medically  appropriate examination and/or evaluation , counseling and educating the patient/family/caregiver, referring and communicating with other health care professionals , documenting information in the medical record, independently interpreting results and communicating that information with the patient/family/caregiver, and care coordination.      CC:  Dr. Jermaine Schuster

## 2024-04-18 ENCOUNTER — TELEPHONE (OUTPATIENT)
Dept: SURGERY | Facility: CLINIC | Age: 64
End: 2024-04-18
Payer: COMMERCIAL

## 2024-04-18 DIAGNOSIS — Z17.0 MALIGNANT NEOPLASM OF OVERLAPPING SITES OF LEFT BREAST IN FEMALE, ESTROGEN RECEPTOR POSITIVE: Primary | ICD-10-CM

## 2024-04-18 DIAGNOSIS — C50.812 MALIGNANT NEOPLASM OF OVERLAPPING SITES OF LEFT BREAST IN FEMALE, ESTROGEN RECEPTOR POSITIVE: Primary | ICD-10-CM

## 2024-04-18 NOTE — TELEPHONE ENCOUNTER
Called pharmacy and ordered the patients EMLA cream. Patient is aware and is expecting the surgery packet.     [place]

## 2024-04-18 NOTE — TELEPHONE ENCOUNTER
Called PT about the upcoming appts:    PATs: 04/25/2024 at 09:00 AM    Surgery: 04/30/2024  Arrive: 05:15 AM  Sentinal: 07:15 AM  Start: 08:00 AM    Post-op: 05/14/2024 09:00 AM    PT gave a verbal understanding of appt dates, times, and locations.    MEN

## 2024-04-24 ENCOUNTER — OFFICE VISIT (OUTPATIENT)
Dept: PSYCHIATRY | Facility: HOSPITAL | Age: 64
End: 2024-04-24
Payer: COMMERCIAL

## 2024-04-24 DIAGNOSIS — F41.0 PANIC DISORDER: ICD-10-CM

## 2024-04-24 DIAGNOSIS — F41.1 GENERALIZED ANXIETY DISORDER: ICD-10-CM

## 2024-04-24 DIAGNOSIS — Z63.4 BEREAVEMENT: ICD-10-CM

## 2024-04-24 DIAGNOSIS — F33.1 MODERATE EPISODE OF RECURRENT MAJOR DEPRESSIVE DISORDER: Primary | ICD-10-CM

## 2024-04-24 RX ORDER — LORAZEPAM 0.5 MG/1
0.5 TABLET ORAL 2 TIMES DAILY
Qty: 60 TABLET | Refills: 0 | Status: SHIPPED | OUTPATIENT
Start: 2024-04-24 | End: 2024-05-16 | Stop reason: SDUPTHER

## 2024-04-24 RX ORDER — VENLAFAXINE HYDROCHLORIDE 75 MG/1
75 CAPSULE, EXTENDED RELEASE ORAL DAILY
Qty: 30 CAPSULE | Refills: 1 | Status: SHIPPED | OUTPATIENT
Start: 2024-04-24 | End: 2024-05-16 | Stop reason: DRUGHIGH

## 2024-04-24 RX ORDER — VENLAFAXINE HYDROCHLORIDE 37.5 MG/1
37.5 CAPSULE, EXTENDED RELEASE ORAL DAILY
Qty: 30 CAPSULE | Refills: 1 | Status: SHIPPED | OUTPATIENT
Start: 2024-04-24 | End: 2024-05-16 | Stop reason: DRUGHIGH

## 2024-04-24 SDOH — SOCIAL STABILITY - SOCIAL INSECURITY: DISSAPEARANCE AND DEATH OF FAMILY MEMBER: Z63.4

## 2024-04-24 NOTE — PATIENT INSTRUCTIONS
[facility]  [address]  [place], [place]   [phone]    Please see below and attached for additional resources:  Group, individual (for the patient and family members), and family therapy    [employer]    [email or web address]    Phone number: [phone]    Support for patients: matched with someone with a similar diagnosis and in survivorship    Friend's for Life  [email or web address]    Self-referrals for wanting a primary care doctor or a specialist  PCP [phone]  Specialist [phone]  Bereavement Support Groups in the [place] Area  Grief Share: https://www.griefshare.org/countries/us/[facility]/[place]/[facility]  *If you go to the website, you can click on the specific group date and it will give additional info about the groups*  Therapy resources  Chronic Illness counseling center  [facility]  [address] , [address]  [place] [address]  [facility]: phone # [phone]    Two Locations:    [address] [facility], [address]  [place] [address]      [address] [facility], [address]  [place] [address]  FREE Massages  Patients are allowed some massages for FREE through the supportive oncology department. Massages at the cancer center and are done by Vandana. She does her own scheduling.  Office:  [phone]    Mobile:  [phone]  FREE exercise program  Free exercise program Livestrong program through the [employer]:  https://www.[facility].org/what-we-do/healthy-living/fitness/livestrong  Chiropractic services ([facility])  [facility]ICA[facility]  [phone]  Accupuncture  Dr. Marni Santana does Accupuncture  [facility]  [address] [place] [address] [phone] phone  [phone] fax    Suicide / Crisis Hotline  Call: 988    OR   Go online and use their text / chat function - 988 Suicide & Crisis Lifeline - Call. Text. Chat.  ([employer].org)  Unified Protocol Safety Plan lesson  National Suicide Hotline Number (074-172-8901)  National safety text line (Text HOME to 765489)    [facility]: Alcohol / Substance Use and Dual Diagnosis Management  [address] [address], [place], [place], [address]  [phone]  OR (348) [phone] https://TelePharm/locations-and-payment/  Domestic Violence  6(848) 433-0633 (SAFE)                            www.kcadv.org   Sexual Assault Crisis Centers (for children and adults)  6(575) 288-6619 (HOPE)  Genesight testing  https://[employer]/gene-test-mental-health-medications/?utm_source=vane&utm_medium=cpc&utm_campaign=[email or web address]&utm_content=9665594172177340&utm_term=genesight%20testing&utm_source=vane&utm_medium=cpc&utm_campaign=branded&utm_content=9108578168535204&utm_term=genesight%20testing&lzkslqr=[id number]

## 2024-04-24 NOTE — PROGRESS NOTES
New psychiatric evaluation    The primary office location is [facility]. The provider is seeing the patient in person for this visit.    Chief Complaint: Sad, anxious, overwhelmed with provider overload and cancer diagnosis, grieving several recent losses, and night sweats    Subjective  Patient ID: Colelen Yanes is a 63 y.o. female who presents for initial consultation through the [facility] at the request of Dr. Riddhi Amezcua, who is following her for left breast cancer, ERP+, diagnosed March 2024. She is also followed by Dr. Jaswinder Alex. She is preparing for a left breast mastectomy and [place] reconstruction on 4/30/2024.       HPI: This is an initial evaluation to assess issues with sadness, anxiety, feeling overwhelmed, and grieving several recent losses, as well as the potential need for psychotropic medication management. The patient endorses she has been overwhelmed with a number of life and medical stressors, including recently being diagnosed with breast cancer, and the deaths of her brother and brother-in-law in March and February.  She reports that she is having her left breast mastectomy on Monday and is having some anticipatory anxiety related to this.  Although, she reports that she is stage I, but there were 2 masses discovered, so she is concerned about this.  Since February, she has had increased anxiety, night sweats, intermittent panic attacks, including racing heart, racing thoughts and ruminative worry, sweating, mild nausea, lasting for 15-30 minutes at a time and most days of the week, more so at night time. She feels she is able to stay distracted at work, but her  goes to bed early and she is alone with her thoughts at night.    She endorses poor sleep quality and quantity, with intermittent fragmentation, averaging about 5 hours per night.  She admits to waking up with anxiety and having difficulty falling back to  sleep.  She reports having good sleep hygiene. She admits to having sleep apnea and using a CPAP machine faithfully. She also struggles with night terrors and restless leg syndrome.  She takes Mirapex 0.5 mg by mouth at bedtime to help with this.  She states she has been prescribed prazosin, but for forgot she was prescribed this and has never used it.      She has decreased interest and motivation in doing things she previously enjoyed.  However, she does continue to push herself to do these things and feels better when she does.  She continues to work from home and feels she is able to distract herself.  Her energy is up and down, depending on her level of sleep. She denies being in any pain. Her concentration and attention are okay.  Her appetite is at her baseline.  She denies any unintentional weight loss or weight gain.  She feels her mood is anxious and expresses having issues with obsessive and ruminative worry.  She denies any passive or active SI, plan or intent, HI, or AVH.  She reports using prayer as a coping skill and believes in the Jainism hoda.  She feels she has good support with her sister, who is a cancer survivor, and with her .    She reports that her dad  when she was 6 years old and found this to be a traumatic experience. She denies any other trauma history. She has a formal history of anxiety, including OCD, depression, and night terrors for the last 20 years. She states she had some outpatient counseling over 20 years ago as well. She denies any inpatient hospitalizations, past thoughts or suicide attempts, or self-harming behaviors.  She reports that she was on Prozac 20 years ago due to obsessive thoughts and behaviors and was later switched to venlafaxine XR due to inefficacy, which she has remained on at 75 mg by mouth daily and Ativan 0.5 mg by mouth twice daily as needed.  She reports that she has previously tried to be titrated up on her venlafaxine to 150 mg, but she  did not tolerate the increase due to side effects and ended up going back down to 75 mg daily.  Additionally, she has been prescribed prazosin, but denies having started it, stating she forgot about it.  She is currently followed by her PCP, Dr. Michael Santos.      ROS: She denies any symptoms or past history of faisal, hypomania, delusional thinking, or psychosis.    Records were reviewed.    PHQ-9 Total Score: 15  She a score of 0 on question 9.    Social History  Marital Status:   Children: 3 - one of her sons is a Doctor, another son is a RN. She has one grandchild that is 2 years old and another grand baby on the way. She also has a daughter who is 26 years old and in nursing school at [employer].  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  Spiritual: Holds Episcopalian beliefs and prays as a form of coping    Medical History  Psychiatric History: Treated for anxiety, OCD, and MDD for over 20 years. No inpatient history.    Medical History:   Past Medical History:   Diagnosis Date    Anxiety     Depression     Diabetes mellitus     Emotional disorder 04/2024    recent loss of brother and diagnosis of left breast cancer    Facial basal cell cancer     using cream    GERD (gastroesophageal reflux disease)     H/O Arrhythmia     Heart palpitations     Hemorrhoid     History of mononucleosis     Hyperlipidemia     Hypertension     Malignant neoplasm of left breast 2024    Restless leg     Sleep apnea     cpap        Family History  Family Psychiatric History: Mother - depression (believes she also took Librium for several years), Brother - depression and previous inpatient hospitalization +15 years ago, Grandmother - OCD.    Family Cancer History: Mother - breast  cancer    The following portions of the patient's history were reviewed and updated as appropriate: She  has a past medical history of Anxiety, Depression, Diabetes mellitus, Emotional disorder (2024), Facial basal cell cancer, GERD (gastroesophageal reflux disease), H/O Arrhythmia, Heart palpitations, Hemorrhoid, History of mononucleosis, Hyperlipidemia, Hypertension, Malignant neoplasm of left breast (), Restless leg, and Sleep apnea.  She does not have any pertinent problems on file.  She  has a past surgical history that includes Dilation and curettage of uterus;  section; US Guided Cyst Aspiration Breast (N/A, 2024); Breast cyst aspiration (Left, ); Breast biopsy (Left); Colonoscopy (); Esophagogastroduodenoscopy; Mastectomy w/ sentinel node biopsy (Left, 2024); and Breast reconstruction (Left, 2024).  Her family history includes Breast cancer in her mother; Coronary artery disease in her brother, brother, father, and sister; Diabetes in her brother, brother, mother, and sister; Heart attack in her father; Heart disease in her brother, brother, father, mother, and sister.  She  reports that she has quit smoking. Her smoking use included cigarettes. She has never been exposed to tobacco smoke. She has never used smokeless tobacco. She reports current alcohol use. She reports that she does not currently use drugs.  Current Outpatient Medications on File Prior to Visit   Medication Sig    Alpha-Lipoic Acid 600 MG capsule Take  by mouth.    CALCIUM-MAG-VIT C-VIT D PO Take  by mouth.    clobetasol (TEMOVATE) 0.05 % ointment Apply 1 application topically to the appropriate area as directed 2 (Two) Times a Week.    esomeprazole (nexIUM) 40 MG capsule TK 1 C PO QD    fluorouracil (EFUDEX) 5 % cream     hydrOXYzine (ATARAX) 25 MG tablet Take 1 tablet by mouth 3 (Three) Times a Day As Needed for Anxiety.    INVOKANA 300 MG tablet TK 1 T PO QAM B NATE    Jardiance 25 MG tablet  tablet Take 1 tablet by mouth Daily.    LORazepam (ATIVAN) 0.5 MG tablet Take 1 tablet by mouth Every 8 (Eight) Hours As Needed for Anxiety.    losartan (COZAAR) 25 MG tablet Take 1 tablet by mouth Daily.    metFORMIN ER (GLUCOPHAGE-XR) 500 MG 24 hr tablet TK 2 TS PO BID WC    metoprolol succinate XL (TOPROL-XL) 25 MG 24 hr tablet TK 1 T PO  QD    nystatin (MYCOSTATIN) 056391 UNIT/GM cream     OZEMPIC 1 MG/DOSE solution pen-injector     pramipexole (MIRAPEX) 0.5 MG tablet Take 1 tablet by mouth Daily.    pravastatin (PRAVACHOL) 40 MG tablet TK 1 T PO D    rosuvastatin (CRESTOR) 40 MG tablet Take 1 tablet by mouth Daily.    Tirzepatide (Mounjaro) 12.5 MG/0.5ML solution pen-injector pen Inject 0.5 mL under the skin into the appropriate area as directed 1 (One) Time Per Week.    venlafaxine XR (EFFEXOR-XR) 75 MG 24 hr capsule TK 1 C PO D    vitamin D3 (vitamin d) 125 MCG (5000 UT) capsule capsule Take  by mouth.       Recent Labs Reviewed:  The patient continues to be followed and managed by her oncology team. Of note, on 04/16/24 - her HgbA1C was elevated at 8.80. On 01/31/2024, her Vitamin B12 level was wnl at 391 andD 25 hydroxy was wnl range at 35.2. Her last TSH panel was wnl on 06/15/2022.       Current Documented Allergies & Reactions  Allergies   Allergen Reactions    Bactrim [Sulfamethoxazole-Trimethoprim] Rash    Sulfa Antibiotics Itching and Rash    Lisinopril Cough    Simvastatin Myalgia     zocor       Objective   Mental Status Exam  Appearance:  clean and casually dressed, appropriate and neat   Attitude toward clinician:  cooperative and agreeable , good eye contact  Speech:    Rate:  regular rate and rhythm   Volume:  normal  Motor:  no abnormal movements present  Mood:  Sad, overwhelmed  Affect: Sad, overwhelmed, tearful at times, mood congruent  Thought Processes:  linear, logical, and goal directed and associations intact  Thought Content:  normal  Suicidal Thoughts:  absent  Homicidal Thoughts:   absent  Perceptual Disturbance: no perceptual disturbance  Attention and Concentration:  good  Insight and Judgement:  good  Memory:  memory appears to be intact    Gait: Within normal limits.  Assistive devices: None.    Medications Reviewed:  Current Psychotropic Medications:    hydrOXYzine (ATARAX) 25 MG tablet, Take 1 tablet by mouth 3 (Three) Times a Day As Needed for Anxiety - currently followed by another provider    LORazepam (ATIVAN) 0.5 MG tablet, Take 1 tablet by mouth Every 8 (Eight) Hours As Needed for Anxiety - currently followed by another provider    venlafaxine XR (EFFEXOR-XR) 75 MG 24 hr capsule, take 1 capusle by mouth daily - currently followed by another provider    Plan of Care  1) Discussed medications and treatment options. The patient would like to focus on improving her mood, decreasing racing thoughts, and improving her sleep.   2) She has been on Venlafaxine XR 75 mg by mouth daily x 20+ years. She did not previously tolerate an increase to 150 mg in the past. Will plan to titrate slowly and increase her dosage by half, to take 75 mg plus 37.5 mg by mouth once daily initially to help with tolerability.   3) Continue Ativan 0.5 mg by mouth BID prn.  4) The patient has some prazosin at home that she has had for several months, but has never tried it.  She was encouraged to try this to help with her night terrors and her physical symptoms of anxiety as needed and let the provider know how she is managing with this medication.  5) Will discuss updating her labs, including her TSH, B12, folate, and Vitamin D 25 hydroxy in the near future to rule out potential medical contributions to her mood changes.   6) Provided supportive talk therapy through active listening, empathy, reflection, normalization of feelings, and positive reframing. Discussed current methods of coping. Encouraged talk therapy with this provider or a trusted source of support. Given resources for [employer] and Friend's for  Life for additional support. Will provide reference material in the after visit summary patient instructions:  [employer]lette's Club    https://www.gck.org/    Friend's for Life  https://www.avfgza9zfcj.org/    6) Notify provider if having any side effects, issues, or concerns      - Follow up with YONATHAN Yost, 2-4 weeks and as needed    Diagnoses and all orders for this visit:    1. Moderate episode of recurrent major depressive disorder (Primary)  -     Discontinue: venlafaxine XR (EFFEXOR-XR) 75 MG 24 hr capsule; Take 1 capsule by mouth Daily for 60 days. (Patient taking differently: Take 1 capsule by mouth After Lunch. Takes with other effexer)  Dispense: 30 capsule; Refill: 1  -     Discontinue: venlafaxine XR (Effexor XR) 37.5 MG 24 hr capsule; Take 1 capsule by mouth Daily for 60 days. Take 1 capsule in combination with venlafaxine xr 75 mg by mouth daily (Patient taking differently: Take 1 capsule by mouth After Lunch. Take 1 capsule in combination with venlafaxine xr 75 mg by mouth daily)  Dispense: 30 capsule; Refill: 1    2. Generalized anxiety disorder  -     Discontinue: venlafaxine XR (EFFEXOR-XR) 75 MG 24 hr capsule; Take 1 capsule by mouth Daily for 60 days. (Patient taking differently: Take 1 capsule by mouth After Lunch. Takes with other effexer)  Dispense: 30 capsule; Refill: 1  -     Discontinue: venlafaxine XR (Effexor XR) 37.5 MG 24 hr capsule; Take 1 capsule by mouth Daily for 60 days. Take 1 capsule in combination with venlafaxine xr 75 mg by mouth daily (Patient taking differently: Take 1 capsule by mouth After Lunch. Take 1 capsule in combination with venlafaxine xr 75 mg by mouth daily)  Dispense: 30 capsule; Refill: 1  -     Discontinue: LORazepam (Ativan) 0.5 MG tablet; Take 1 tablet by mouth 2 (Two) Times a Day. (Patient taking differently: Take 0.5-1 tablets by mouth 2 (Two) Times a Day.)  Dispense: 60 tablet; Refill: 0    3. Panic disorder    4. Bereavement    - R/o bipolar disorder  -  R/o OCD

## 2024-04-25 ENCOUNTER — PRE-ADMISSION TESTING (OUTPATIENT)
Dept: PREADMISSION TESTING | Facility: HOSPITAL | Age: 64
End: 2024-04-25
Payer: COMMERCIAL

## 2024-04-25 ENCOUNTER — TELEPHONE (OUTPATIENT)
Dept: SURGERY | Facility: CLINIC | Age: 64
End: 2024-04-25
Payer: COMMERCIAL

## 2024-04-25 VITALS
RESPIRATION RATE: 20 BRPM | DIASTOLIC BLOOD PRESSURE: 71 MMHG | SYSTOLIC BLOOD PRESSURE: 123 MMHG | BODY MASS INDEX: 29.53 KG/M2 | HEART RATE: 84 BPM | OXYGEN SATURATION: 99 % | HEIGHT: 64 IN | WEIGHT: 173 LBS | TEMPERATURE: 97.3 F

## 2024-04-25 DIAGNOSIS — Z17.0 MALIGNANT NEOPLASM OF OVERLAPPING SITES OF LEFT BREAST IN FEMALE, ESTROGEN RECEPTOR POSITIVE: ICD-10-CM

## 2024-04-25 DIAGNOSIS — C50.812 MALIGNANT NEOPLASM OF OVERLAPPING SITES OF LEFT BREAST IN FEMALE, ESTROGEN RECEPTOR POSITIVE: ICD-10-CM

## 2024-04-25 LAB
ANION GAP SERPL CALCULATED.3IONS-SCNC: 12.9 MMOL/L (ref 5–15)
BUN SERPL-MCNC: 13 MG/DL (ref 8–23)
BUN/CREAT SERPL: 18.8 (ref 7–25)
CALCIUM SPEC-SCNC: 9.6 MG/DL (ref 8.6–10.5)
CHLORIDE SERPL-SCNC: 101 MMOL/L (ref 98–107)
CO2 SERPL-SCNC: 24.1 MMOL/L (ref 22–29)
CREAT SERPL-MCNC: 0.69 MG/DL (ref 0.57–1)
DEPRECATED RDW RBC AUTO: 39.7 FL (ref 37–54)
EGFRCR SERPLBLD CKD-EPI 2021: 97.7 ML/MIN/1.73
ERYTHROCYTE [DISTWIDTH] IN BLOOD BY AUTOMATED COUNT: 12.5 % (ref 12.3–15.4)
GLUCOSE SERPL-MCNC: 265 MG/DL (ref 65–99)
HCT VFR BLD AUTO: 40.7 % (ref 34–46.6)
HGB BLD-MCNC: 12.7 G/DL (ref 12–15.9)
MCH RBC QN AUTO: 27.1 PG (ref 26.6–33)
MCHC RBC AUTO-ENTMCNC: 31.2 G/DL (ref 31.5–35.7)
MCV RBC AUTO: 86.8 FL (ref 79–97)
PLATELET # BLD AUTO: 249 10*3/MM3 (ref 140–450)
PMV BLD AUTO: 9.3 FL (ref 6–12)
POTASSIUM SERPL-SCNC: 4.3 MMOL/L (ref 3.5–5.2)
QT INTERVAL: 444 MS
QTC INTERVAL: 500 MS
RBC # BLD AUTO: 4.69 10*6/MM3 (ref 3.77–5.28)
SODIUM SERPL-SCNC: 138 MMOL/L (ref 136–145)
WBC NRBC COR # BLD AUTO: 7.37 10*3/MM3 (ref 3.4–10.8)

## 2024-04-25 PROCEDURE — 36415 COLL VENOUS BLD VENIPUNCTURE: CPT

## 2024-04-25 PROCEDURE — 85027 COMPLETE CBC AUTOMATED: CPT

## 2024-04-25 PROCEDURE — 93010 ELECTROCARDIOGRAM REPORT: CPT | Performed by: INTERNAL MEDICINE

## 2024-04-25 PROCEDURE — 93005 ELECTROCARDIOGRAM TRACING: CPT

## 2024-04-25 PROCEDURE — 80048 BASIC METABOLIC PNL TOTAL CA: CPT

## 2024-04-25 RX ORDER — LIDOCAINE AND PRILOCAINE 25; 25 MG/G; MG/G
1 CREAM TOPICAL
COMMUNITY
Start: 2024-04-18

## 2024-04-25 RX ORDER — BLOOD SUGAR DIAGNOSTIC
1 STRIP MISCELLANEOUS DAILY
COMMUNITY
Start: 2024-04-18

## 2024-04-25 RX ORDER — NAPROXEN SODIUM 220 MG
660 TABLET ORAL 2 TIMES DAILY PRN
COMMUNITY

## 2024-04-25 NOTE — DISCHARGE INSTRUCTIONS
"Holding GLP-1 Receptor Agonists Prior to Anesthesia    In order for safe anesthesia, GLP-1 receptor agonist mediations need to be held before the procedure.     What are GLP-1 Receptor Agonists?  Glucagon-like peptide-1 (GLP-1) receptor agonists are approved by the Food and Drug Administration for treatment of type 2 diabetes mellitus and cardiovascular risk reduction. In addition, GLP-1 receptor agonists are also used for weight loss.     Which of my medications are GLP-1 Receptor Agonists?   Exanetide (Byetta®, Bydureon®)  Lixisenatide (Adlyxin®, Soliqua®)  Semaglutide (Wegovy®, Ozempic®, Rybelsus®)  Liraglutide (Saxenda®, Victoza®, Xutolphy®)  Dulaglutide (Trulicity®)   Tirzepatide (Mounjaro®, Zepbound®)    How can GLP-1 Receptor Agonists cause problems during surgery?  GLP-1 agonists can potentially cause adverse gastrointestinal effects, including the consequences of delayed gastric emptying. This can cause the stomach to be full even after refraining from eating and drinking before surgery and can cause regurgitation or \"throwing up\" of the stomach contents during anesthesia. If the contents enter the airway and the lungs, it could cause anesthesia complications and a severe pneumonia.     What should I do prior to my procedure?  According to the recommendations of the American Society of Anesthesiologists:    If you take GLP-1 agonists every day: Hold the medication on the day of the procedure/surgery.   If you take GLP-1 agonists once a week: Hold the medication a week prior to the procedure/surgery    What if I have questions?  If you have concerns or questions about holding your medications prior to your procedure, contact your doctors before stopping your medications.    Modified from the American Society of Anesthesiologists Consensus-Based Guidance on Preoperative Management of Patients (Adults and Children) on Glucagon-Like Peptide-1 (GLP-1) Receptor Agonists June 29,2023      Take the following " medications the morning of surgery:    none    If you are on prescription narcotic pain medication to control your pain you may also take that medication the morning of surgery.    General Instructions:  Do not eat solid food after midnight the night before surgery.  You may drink clear liquids day of surgery but must stop at least one hour before your hospital arrival time.  It is beneficial for you to have a clear drink that contains carbohydrates the day of surgery.  We suggest a 12 to 20 ounce bottle of Gatorade or Powerade for non-diabetic patients or a 12 to 20 ounce bottle of G2 or Powerade Zero for diabetic patients. (Pediatric patients, are not advised to drink a 12 to 20 ounce carbohydrate drink)    Clear liquids are liquids you can see through.  Nothing red in color.     Plain water                               Sports drinks  Sodas                                   Gelatin (Jell-O)  Fruit juices without pulp such as white grape juice and apple juice  Popsicles that contain no fruit or yogurt  Tea or coffee (no cream or milk added)  Gatorade / Powerade  G2 / Powerade Zero    Infants may have breast milk up to four hours before surgery.  Infants drinking formula may drink formula up to six hours before surgery.   Patients who avoid smoking, chewing tobacco and alcohol for 4 weeks prior to surgery have a reduced risk of post-operative complications.  Quit smoking as many days before surgery as you can.  Do not smoke, use chewing tobacco or drink alcohol the day of surgery.   If applicable bring your C-PAP/ BI-PAP machine in with you to preop day of surgery.  Bring any papers given to you in the doctor’s office.  Wear clean comfortable clothes.  Do not wear contact lenses, false eyelashes or make-up.  Bring a case for your glasses.   Bring crutches or walker if applicable.  Remove all piercings.  Leave jewelry and any other valuables at home.  Hair extensions with metal clips must be removed prior to  surgery.  The Pre-Admission Testing nurse will instruct you to bring medications if unable to obtain an accurate list in Pre-Admission Testing.        If you were given a blood bank ID arm band remember to bring it with you the day of surgery.    Preventing a Surgical Site Infection:  For 2 to 3 days before surgery, avoid shaving with a razor because the razor can irritate skin and make it easier to develop an infection.    Any areas of open skin can increase the risk of a post-operative wound infection by allowing bacteria to enter and travel throughout the body.  Notify your surgeon if you have any skin wounds / rashes even if it is not near the expected surgical site.  The area will need assessed to determine if surgery should be delayed until it is healed.  The night prior to surgery shower using a fresh bar of anti-bacterial soap (such as Dial) and clean washcloth.  Sleep in a clean bed with clean clothing.  Do not allow pets to sleep with you.  Shower on the morning of surgery using a fresh bar of anti-bacterial soap (such as Dial) and clean washcloth.  Dry with a clean towel and dress in clean clothing.  Ask your surgeon if you will be receiving antibiotics prior to surgery.  Make sure you, your family, and all healthcare providers clean their hands with soap and water or an alcohol based hand  before caring for you or your wound.    Day of surgery:  Your arrival time is approximately two hours before your scheduled surgery time.  Upon arrival, a Pre-op nurse and Anesthesiologist will review your health history, obtain vital signs, and answer questions you may have.  The only belongings needed at this time will be a list of your home medications and if applicable your C-PAP/BI-PAP machine.  A Pre-op nurse will start an IV and you may receive medication in preparation for surgery, including something to help you relax.     Please be aware that surgery does come with discomfort.  We want to make every  effort to control your discomfort so please discuss any uncontrolled symptoms with your nurse.   Your doctor will most likely have prescribed pain medications.      If you are going home after surgery you will receive individualized written care instructions before being discharged.  A responsible adult must drive you to and from the hospital on the day of your surgery and ideally stay with you through the night.   .  Discharge prescriptions can be filled by the hospital pharmacy during regular pharmacy hours.  If you are having surgery late in the day/evening your prescription may be e-prescribed to your pharmacy.  Please verify your pharmacy hours or chose a 24 hour pharmacy to avoid not having access to your prescription because your pharmacy has closed for the day.    If you are staying overnight following surgery, you will be transported to your hospital room following the recovery period.  [facility] has all private rooms.    If you have any questions please call Pre-Admission Testing at [phone].  Deductibles and co-payments are collected on the day of service. Please be prepared to pay the required co-pay, deductible or deposit on the day of service as defined by your plan.    Call your surgeon immediately if you experience any of the following symptoms:  Sore Throat  Shortness of Breath or difficulty breathing  Cough  Chills  Body soreness or muscle pain  Headache  Fever  New loss of taste or smell  Do not arrive for your surgery ill.  Your procedure will need to be rescheduled to another time.  You will need to call your physician before the day of surgery to avoid any unnecessary exposure to hospital staff as well as other patients.

## 2024-04-30 ENCOUNTER — TRANSCRIBE ORDERS (OUTPATIENT)
Dept: LAB | Facility: HOSPITAL | Age: 64
End: 2024-04-30
Payer: COMMERCIAL

## 2024-04-30 ENCOUNTER — HOSPITAL ENCOUNTER (OUTPATIENT)
Dept: NUCLEAR MEDICINE | Facility: HOSPITAL | Age: 64
Discharge: HOME | End: 2024-04-30
Payer: COMMERCIAL

## 2024-04-30 ENCOUNTER — HOSPITAL ENCOUNTER (OUTPATIENT)
Facility: HOSPITAL | Age: 64
Setting detail: HOSPITAL OUTPATIENT SURGERY
Discharge: HOME | End: 2024-04-30
Attending: STUDENT IN AN ORGANIZED HEALTH CARE EDUCATION/TRAINING PROGRAM | Admitting: STUDENT IN AN ORGANIZED HEALTH CARE EDUCATION/TRAINING PROGRAM
Payer: COMMERCIAL

## 2024-04-30 ENCOUNTER — ANESTHESIA (OUTPATIENT)
Dept: PERIOP | Facility: HOSPITAL | Age: 64
End: 2024-04-30
Payer: COMMERCIAL

## 2024-04-30 ENCOUNTER — ANCILLARY PROCEDURE (OUTPATIENT)
Dept: LAB | Facility: HOSPITAL | Age: 64
End: 2024-04-30
Payer: COMMERCIAL

## 2024-04-30 ENCOUNTER — ANESTHESIA EVENT (OUTPATIENT)
Dept: PERIOP | Facility: HOSPITAL | Age: 64
End: 2024-04-30
Payer: COMMERCIAL

## 2024-04-30 VITALS
RESPIRATION RATE: 16 BRPM | OXYGEN SATURATION: 97 % | DIASTOLIC BLOOD PRESSURE: 70 MMHG | TEMPERATURE: 99.1 F | SYSTOLIC BLOOD PRESSURE: 119 MMHG | HEART RATE: 90 BPM

## 2024-04-30 DIAGNOSIS — Z17.0 MALIGNANT NEOPLASM OF OVERLAPPING SITES OF LEFT BREAST IN FEMALE, ESTROGEN RECEPTOR POSITIVE: ICD-10-CM

## 2024-04-30 DIAGNOSIS — C50.812 MALIGNANT NEOPLASM OF OVERLAPPING SITES OF LEFT BREAST IN FEMALE, ESTROGEN RECEPTOR POSITIVE: ICD-10-CM

## 2024-04-30 DIAGNOSIS — C50.912 MALIGNANT NEOPLASM OF LEFT FEMALE BREAST, UNSPECIFIED ESTROGEN RECEPTOR STATUS, UNSPECIFIED SITE OF BREAST: Primary | ICD-10-CM

## 2024-04-30 DIAGNOSIS — C50.912 MALIGNANT NEOPLASM OF LEFT FEMALE BREAST, UNSPECIFIED ESTROGEN RECEPTOR STATUS, UNSPECIFIED SITE OF BREAST: ICD-10-CM

## 2024-04-30 LAB
GLUCOSE BLDC GLUCOMTR-MCNC: 195 MG/DL (ref 70–130)
GLUCOSE BLDC GLUCOMTR-MCNC: 250 MG/DL (ref 70–130)

## 2024-04-30 PROCEDURE — 88341 IMHCHEM/IMCYTCHM EA ADD ANTB: CPT | Performed by: STUDENT IN AN ORGANIZED HEALTH CARE EDUCATION/TRAINING PROGRAM

## 2024-04-30 PROCEDURE — 88342 IMHCHEM/IMCYTCHM 1ST ANTB: CPT | Performed by: STUDENT IN AN ORGANIZED HEALTH CARE EDUCATION/TRAINING PROGRAM

## 2024-04-30 PROCEDURE — 25810000003 LACTATED RINGERS PER 1000 ML: Performed by: NURSE ANESTHETIST, CERTIFIED REGISTERED

## 2024-04-30 PROCEDURE — 82948 REAGENT STRIP/BLOOD GLUCOSE: CPT

## 2024-04-30 PROCEDURE — 25010000002 BUPIVACAINE (PF) 0.25 % SOLUTION 10 ML VIAL: Performed by: SURGERY

## 2024-04-30 PROCEDURE — 76098 X-RAY EXAM SURGICAL SPECIMEN: CPT

## 2024-04-30 PROCEDURE — 25010000002 GENTAMICIN PER 80 MG: Performed by: SURGERY

## 2024-04-30 PROCEDURE — A9520 TC99 TILMANOCEPT DIAG 0.5MCI: HCPCS | Performed by: STUDENT IN AN ORGANIZED HEALTH CARE EDUCATION/TRAINING PROGRAM

## 2024-04-30 PROCEDURE — 0 BUPIVACAINE LIPOSOME 1.3 % SUSPENSION 20 ML VIAL: Performed by: SURGERY

## 2024-04-30 PROCEDURE — 0 TECHETIUM TC99M TILMANOCEPT: Performed by: STUDENT IN AN ORGANIZED HEALTH CARE EDUCATION/TRAINING PROGRAM

## 2024-04-30 PROCEDURE — 25810000003 LACTATED RINGERS PER 1000 ML: Performed by: ANESTHESIOLOGY

## 2024-04-30 PROCEDURE — 25010000002 FENTANYL CITRATE (PF) 50 MCG/ML SOLUTION: Performed by: NURSE ANESTHETIST, CERTIFIED REGISTERED

## 2024-04-30 PROCEDURE — 88307 TISSUE EXAM BY PATHOLOGIST: CPT | Performed by: STUDENT IN AN ORGANIZED HEALTH CARE EDUCATION/TRAINING PROGRAM

## 2024-04-30 PROCEDURE — 25010000002 CEFAZOLIN PER 500 MG: Performed by: SURGERY

## 2024-04-30 PROCEDURE — 25010000002 DEXAMETHASONE SODIUM PHOSPHATE 20 MG/5ML SOLUTION: Performed by: NURSE ANESTHETIST, CERTIFIED REGISTERED

## 2024-04-30 PROCEDURE — 25010000002 ONDANSETRON PER 1 MG: Performed by: NURSE ANESTHETIST, CERTIFIED REGISTERED

## 2024-04-30 PROCEDURE — C9290 INJ, BUPIVACAINE LIPOSOME: HCPCS | Performed by: SURGERY

## 2024-04-30 PROCEDURE — 88302 TISSUE EXAM BY PATHOLOGIST: CPT | Performed by: SURGERY

## 2024-04-30 PROCEDURE — 25010000002 INDOCYANINE GREEN 25 MG RECONSTITUTED SOLUTION: Performed by: NURSE ANESTHETIST, CERTIFIED REGISTERED

## 2024-04-30 PROCEDURE — 25010000002 CEFAZOLIN PER 500 MG: Performed by: STUDENT IN AN ORGANIZED HEALTH CARE EDUCATION/TRAINING PROGRAM

## 2024-04-30 PROCEDURE — 25010000002 GLYCOPYRROLATE 0.2 MG/ML SOLUTION: Performed by: NURSE ANESTHETIST, CERTIFIED REGISTERED

## 2024-04-30 PROCEDURE — 38792 RA TRACER ID OF SENTINL NODE: CPT

## 2024-04-30 PROCEDURE — 25010000002 PROPOFOL 10 MG/ML EMULSION: Performed by: NURSE ANESTHETIST, CERTIFIED REGISTERED

## 2024-04-30 PROCEDURE — C1789 PROSTHESIS, BREAST, IMP: HCPCS | Performed by: STUDENT IN AN ORGANIZED HEALTH CARE EDUCATION/TRAINING PROGRAM

## 2024-04-30 PROCEDURE — 25010000002 PROPOFOL 200 MG/20ML EMULSION: Performed by: NURSE ANESTHETIST, CERTIFIED REGISTERED

## 2024-04-30 PROCEDURE — 25010000002 CEFAZOLIN PER 500 MG: Performed by: NURSE ANESTHETIST, CERTIFIED REGISTERED

## 2024-04-30 PROCEDURE — 25010000002 ISOSULFAN BLUE 1 % SOLUTION: Performed by: STUDENT IN AN ORGANIZED HEALTH CARE EDUCATION/TRAINING PROGRAM

## 2024-04-30 DEVICE — GRFT TISS ALLODERM RTM PERF 16X20CM 2.4MM/THK .4MM: Type: IMPLANTABLE DEVICE | Site: BREAST | Status: FUNCTIONAL

## 2024-04-30 DEVICE — KNOTLESS TISSUE CONTROL DEVICE, UNDYED UNIDIRECTIONAL (ANTIBACTERIAL) SYNTHETIC ABSORBABLE DEVICE
Type: IMPLANTABLE DEVICE | Site: BREAST | Status: FUNCTIONAL
Brand: STRATAFIX

## 2024-04-30 DEVICE — LIGACLIP MCA MULTIPLE CLIP APPLIERS, 20 MEDIUM CLIPS
Type: IMPLANTABLE DEVICE | Site: BREAST | Status: FUNCTIONAL
Brand: LIGACLIP

## 2024-04-30 RX ORDER — MIDAZOLAM HYDROCHLORIDE 1 MG/ML
1 INJECTION INTRAMUSCULAR; INTRAVENOUS
Status: DISCONTINUED | OUTPATIENT
Start: 2024-04-30 | End: 2024-04-30 | Stop reason: HOSPADM

## 2024-04-30 RX ORDER — LIDOCAINE HYDROCHLORIDE 20 MG/ML
INJECTION, SOLUTION INFILTRATION; PERINEURAL AS NEEDED
Status: DISCONTINUED | OUTPATIENT
Start: 2024-04-30 | End: 2024-04-30 | Stop reason: SURG

## 2024-04-30 RX ORDER — LIDOCAINE HYDROCHLORIDE 10 MG/ML
0.5 INJECTION, SOLUTION INFILTRATION; PERINEURAL ONCE AS NEEDED
Status: DISCONTINUED | OUTPATIENT
Start: 2024-04-30 | End: 2024-04-30 | Stop reason: HOSPADM

## 2024-04-30 RX ORDER — ONDANSETRON 2 MG/ML
4 INJECTION INTRAMUSCULAR; INTRAVENOUS ONCE AS NEEDED
Status: DISCONTINUED | OUTPATIENT
Start: 2024-04-30 | End: 2024-04-30 | Stop reason: HOSPADM

## 2024-04-30 RX ORDER — ISOSULFAN BLUE 50 MG/5ML
INJECTION, SOLUTION SUBCUTANEOUS AS NEEDED
Status: DISCONTINUED | OUTPATIENT
Start: 2024-04-30 | End: 2024-04-30 | Stop reason: HOSPADM

## 2024-04-30 RX ORDER — SODIUM CHLORIDE 0.9 % (FLUSH) 0.9 %
3-10 SYRINGE (ML) INJECTION AS NEEDED
Status: DISCONTINUED | OUTPATIENT
Start: 2024-04-30 | End: 2024-04-30 | Stop reason: HOSPADM

## 2024-04-30 RX ORDER — FAMOTIDINE 10 MG/ML
20 INJECTION, SOLUTION INTRAVENOUS ONCE
Status: COMPLETED | OUTPATIENT
Start: 2024-04-30 | End: 2024-04-30

## 2024-04-30 RX ORDER — DOCUSATE SODIUM 100 MG/1
1 CAPSULE, LIQUID FILLED ORAL EVERY 12 HOURS SCHEDULED
COMMUNITY
Start: 2024-04-24

## 2024-04-30 RX ORDER — PROPOFOL 10 MG/ML
INJECTION, EMULSION INTRAVENOUS AS NEEDED
Status: DISCONTINUED | OUTPATIENT
Start: 2024-04-30 | End: 2024-04-30 | Stop reason: SURG

## 2024-04-30 RX ORDER — CELECOXIB 200 MG/1
200 CAPSULE ORAL ONCE
COMMUNITY
Start: 2024-04-24

## 2024-04-30 RX ORDER — GABAPENTIN 300 MG/1
300 CAPSULE ORAL ONCE
COMMUNITY
Start: 2024-04-24

## 2024-04-30 RX ORDER — FENTANYL CITRATE 50 UG/ML
50 INJECTION, SOLUTION INTRAMUSCULAR; INTRAVENOUS
Status: DISCONTINUED | OUTPATIENT
Start: 2024-04-30 | End: 2024-04-30 | Stop reason: HOSPADM

## 2024-04-30 RX ORDER — SODIUM CHLORIDE 0.9 % (FLUSH) 0.9 %
3 SYRINGE (ML) INJECTION EVERY 12 HOURS SCHEDULED
Status: DISCONTINUED | OUTPATIENT
Start: 2024-04-30 | End: 2024-04-30 | Stop reason: HOSPADM

## 2024-04-30 RX ORDER — ACETAMINOPHEN 500 MG
1000 TABLET ORAL ONCE
COMMUNITY

## 2024-04-30 RX ORDER — IPRATROPIUM BROMIDE AND ALBUTEROL SULFATE 2.5; .5 MG/3ML; MG/3ML
3 SOLUTION RESPIRATORY (INHALATION) ONCE AS NEEDED
Status: DISCONTINUED | OUTPATIENT
Start: 2024-04-30 | End: 2024-04-30 | Stop reason: HOSPADM

## 2024-04-30 RX ORDER — LIDOCAINE 40 MG/G
1 CREAM TOPICAL ONCE
Status: DISCONTINUED | OUTPATIENT
Start: 2024-04-30 | End: 2024-04-30 | Stop reason: HOSPADM

## 2024-04-30 RX ORDER — MAGNESIUM HYDROXIDE 1200 MG/15ML
LIQUID ORAL AS NEEDED
Status: DISCONTINUED | OUTPATIENT
Start: 2024-04-30 | End: 2024-04-30 | Stop reason: HOSPADM

## 2024-04-30 RX ORDER — EPHEDRINE SULFATE 50 MG/ML
5 INJECTION, SOLUTION INTRAVENOUS ONCE AS NEEDED
Status: DISCONTINUED | OUTPATIENT
Start: 2024-04-30 | End: 2024-04-30 | Stop reason: HOSPADM

## 2024-04-30 RX ORDER — DIAZEPAM 5 MG/1
10 TABLET ORAL ONCE
Status: COMPLETED | OUTPATIENT
Start: 2024-04-30 | End: 2024-04-30

## 2024-04-30 RX ORDER — CEFADROXIL 500 MG/1
1 CAPSULE ORAL EVERY 12 HOURS SCHEDULED
COMMUNITY
Start: 2024-04-24

## 2024-04-30 RX ORDER — ONDANSETRON 2 MG/ML
INJECTION INTRAMUSCULAR; INTRAVENOUS AS NEEDED
Status: DISCONTINUED | OUTPATIENT
Start: 2024-04-30 | End: 2024-04-30 | Stop reason: SURG

## 2024-04-30 RX ORDER — PHENYLEPHRINE HCL IN 0.9% NACL 1 MG/10 ML
SYRINGE (ML) INTRAVENOUS AS NEEDED
Status: DISCONTINUED | OUTPATIENT
Start: 2024-04-30 | End: 2024-04-30 | Stop reason: SURG

## 2024-04-30 RX ORDER — HYDROMORPHONE HYDROCHLORIDE 1 MG/ML
0.5 INJECTION, SOLUTION INTRAMUSCULAR; INTRAVENOUS; SUBCUTANEOUS
Status: DISCONTINUED | OUTPATIENT
Start: 2024-04-30 | End: 2024-04-30 | Stop reason: HOSPADM

## 2024-04-30 RX ORDER — OXYCODONE AND ACETAMINOPHEN 7.5; 325 MG/1; MG/1
1 TABLET ORAL EVERY 4 HOURS PRN
Status: DISCONTINUED | OUTPATIENT
Start: 2024-04-30 | End: 2024-04-30 | Stop reason: HOSPADM

## 2024-04-30 RX ORDER — PROMETHAZINE HYDROCHLORIDE 25 MG/1
25 TABLET ORAL ONCE AS NEEDED
Status: DISCONTINUED | OUTPATIENT
Start: 2024-04-30 | End: 2024-04-30 | Stop reason: HOSPADM

## 2024-04-30 RX ORDER — SODIUM CHLORIDE, SODIUM LACTATE, POTASSIUM CHLORIDE, CALCIUM CHLORIDE 600; 310; 30; 20 MG/100ML; MG/100ML; MG/100ML; MG/100ML
9 INJECTION, SOLUTION INTRAVENOUS CONTINUOUS
Status: DISCONTINUED | OUTPATIENT
Start: 2024-04-30 | End: 2024-04-30 | Stop reason: HOSPADM

## 2024-04-30 RX ORDER — SODIUM CHLORIDE, SODIUM LACTATE, POTASSIUM CHLORIDE, CALCIUM CHLORIDE 600; 310; 30; 20 MG/100ML; MG/100ML; MG/100ML; MG/100ML
INJECTION, SOLUTION INTRAVENOUS CONTINUOUS PRN
Status: DISCONTINUED | OUTPATIENT
Start: 2024-04-30 | End: 2024-04-30 | Stop reason: SURG

## 2024-04-30 RX ORDER — FLUMAZENIL 0.1 MG/ML
0.2 INJECTION INTRAVENOUS AS NEEDED
Status: DISCONTINUED | OUTPATIENT
Start: 2024-04-30 | End: 2024-04-30 | Stop reason: HOSPADM

## 2024-04-30 RX ORDER — SUCCINYLCHOLINE/SOD CL,ISO/PF 200MG/10ML
SYRINGE (ML) INTRAVENOUS AS NEEDED
Status: DISCONTINUED | OUTPATIENT
Start: 2024-04-30 | End: 2024-04-30 | Stop reason: SURG

## 2024-04-30 RX ORDER — CEFAZOLIN SODIUM 500 MG/2.2ML
INJECTION, POWDER, FOR SOLUTION INTRAMUSCULAR; INTRAVENOUS AS NEEDED
Status: DISCONTINUED | OUTPATIENT
Start: 2024-04-30 | End: 2024-04-30 | Stop reason: SURG

## 2024-04-30 RX ORDER — GLYCOPYRROLATE 0.2 MG/ML
INJECTION INTRAMUSCULAR; INTRAVENOUS AS NEEDED
Status: DISCONTINUED | OUTPATIENT
Start: 2024-04-30 | End: 2024-04-30 | Stop reason: SURG

## 2024-04-30 RX ORDER — FENTANYL CITRATE 50 UG/ML
INJECTION, SOLUTION INTRAMUSCULAR; INTRAVENOUS AS NEEDED
Status: DISCONTINUED | OUTPATIENT
Start: 2024-04-30 | End: 2024-04-30 | Stop reason: SURG

## 2024-04-30 RX ORDER — EPHEDRINE SULFATE 50 MG/ML
INJECTION INTRAVENOUS AS NEEDED
Status: DISCONTINUED | OUTPATIENT
Start: 2024-04-30 | End: 2024-04-30 | Stop reason: SURG

## 2024-04-30 RX ORDER — HYDROCODONE BITARTRATE AND ACETAMINOPHEN 5; 325 MG/1; MG/1
1 TABLET ORAL ONCE AS NEEDED
Status: COMPLETED | OUTPATIENT
Start: 2024-04-30 | End: 2024-04-30

## 2024-04-30 RX ORDER — HYDRALAZINE HYDROCHLORIDE 20 MG/ML
5 INJECTION INTRAMUSCULAR; INTRAVENOUS
Status: DISCONTINUED | OUTPATIENT
Start: 2024-04-30 | End: 2024-04-30 | Stop reason: HOSPADM

## 2024-04-30 RX ORDER — DEXAMETHASONE SODIUM PHOSPHATE 4 MG/ML
INJECTION, SOLUTION INTRA-ARTICULAR; INTRALESIONAL; INTRAMUSCULAR; INTRAVENOUS; SOFT TISSUE AS NEEDED
Status: DISCONTINUED | OUTPATIENT
Start: 2024-04-30 | End: 2024-04-30 | Stop reason: SURG

## 2024-04-30 RX ORDER — LABETALOL HYDROCHLORIDE 5 MG/ML
5 INJECTION, SOLUTION INTRAVENOUS
Status: DISCONTINUED | OUTPATIENT
Start: 2024-04-30 | End: 2024-04-30 | Stop reason: HOSPADM

## 2024-04-30 RX ORDER — DIPHENHYDRAMINE HYDROCHLORIDE 50 MG/ML
12.5 INJECTION INTRAMUSCULAR; INTRAVENOUS
Status: DISCONTINUED | OUTPATIENT
Start: 2024-04-30 | End: 2024-04-30 | Stop reason: HOSPADM

## 2024-04-30 RX ORDER — DEXMEDETOMIDINE HYDROCHLORIDE 100 UG/ML
INJECTION, SOLUTION INTRAVENOUS AS NEEDED
Status: DISCONTINUED | OUTPATIENT
Start: 2024-04-30 | End: 2024-04-30 | Stop reason: SURG

## 2024-04-30 RX ORDER — NALOXONE HCL 0.4 MG/ML
0.2 VIAL (ML) INJECTION AS NEEDED
Status: DISCONTINUED | OUTPATIENT
Start: 2024-04-30 | End: 2024-04-30 | Stop reason: HOSPADM

## 2024-04-30 RX ORDER — DROPERIDOL 2.5 MG/ML
0.62 INJECTION, SOLUTION INTRAMUSCULAR; INTRAVENOUS
Status: DISCONTINUED | OUTPATIENT
Start: 2024-04-30 | End: 2024-04-30 | Stop reason: HOSPADM

## 2024-04-30 RX ORDER — PROMETHAZINE HYDROCHLORIDE 25 MG/1
25 SUPPOSITORY RECTAL ONCE AS NEEDED
Status: DISCONTINUED | OUTPATIENT
Start: 2024-04-30 | End: 2024-04-30 | Stop reason: HOSPADM

## 2024-04-30 RX ORDER — KETAMINE HCL IN NACL, ISO-OSM 100MG/10ML
SYRINGE (ML) INJECTION AS NEEDED
Status: DISCONTINUED | OUTPATIENT
Start: 2024-04-30 | End: 2024-04-30 | Stop reason: SURG

## 2024-04-30 RX ORDER — ROCURONIUM BROMIDE 10 MG/ML
INJECTION, SOLUTION INTRAVENOUS AS NEEDED
Status: DISCONTINUED | OUTPATIENT
Start: 2024-04-30 | End: 2024-04-30 | Stop reason: SURG

## 2024-04-30 RX ORDER — INDOCYANINE GREEN AND WATER 25 MG
KIT INJECTION AS NEEDED
Status: DISCONTINUED | OUTPATIENT
Start: 2024-04-30 | End: 2024-04-30 | Stop reason: SURG

## 2024-04-30 RX ADMIN — INDOCYANINE GREEN AND WATER 7.5 MG: KIT at 10:59

## 2024-04-30 RX ADMIN — SODIUM CHLORIDE, POTASSIUM CHLORIDE, SODIUM LACTATE AND CALCIUM CHLORIDE: 600; 310; 30; 20 INJECTION, SOLUTION INTRAVENOUS at 09:17

## 2024-04-30 RX ADMIN — Medication 100 MCG: at 10:59

## 2024-04-30 RX ADMIN — Medication 100 MCG: at 11:33

## 2024-04-30 RX ADMIN — FENTANYL CITRATE 50 MCG: 50 INJECTION, SOLUTION INTRAMUSCULAR; INTRAVENOUS at 09:13

## 2024-04-30 RX ADMIN — DEXAMETHASONE SODIUM PHOSPHATE 4 MG: 4 INJECTION, SOLUTION INTRAMUSCULAR; INTRAVENOUS at 08:21

## 2024-04-30 RX ADMIN — LIDOCAINE HYDROCHLORIDE 80 MG: 20 INJECTION, SOLUTION INFILTRATION; PERINEURAL at 08:04

## 2024-04-30 RX ADMIN — FAMOTIDINE 20 MG: 10 INJECTION INTRAVENOUS at 07:52

## 2024-04-30 RX ADMIN — DEXMEDETOMIDINE HCL 10 MCG: 100 INJECTION INTRAVENOUS at 08:44

## 2024-04-30 RX ADMIN — GLYCOPYRROLATE 0.1 MG: 0.2 INJECTION INTRAMUSCULAR; INTRAVENOUS at 09:43

## 2024-04-30 RX ADMIN — SODIUM CHLORIDE, POTASSIUM CHLORIDE, SODIUM LACTATE AND CALCIUM CHLORIDE: 600; 310; 30; 20 INJECTION, SOLUTION INTRAVENOUS at 07:56

## 2024-04-30 RX ADMIN — INDOCYANINE GREEN AND WATER 7.5 MG: KIT at 12:09

## 2024-04-30 RX ADMIN — Medication 20 MG: at 08:22

## 2024-04-30 RX ADMIN — Medication 15 MG: at 11:04

## 2024-04-30 RX ADMIN — SODIUM CHLORIDE 2000 MG: 900 INJECTION INTRAVENOUS at 07:52

## 2024-04-30 RX ADMIN — TILMANOCEPT 1 DOSE: KIT at 07:21

## 2024-04-30 RX ADMIN — DEXMEDETOMIDINE HCL 10 MCG: 100 INJECTION INTRAVENOUS at 08:36

## 2024-04-30 RX ADMIN — Medication 100 MCG: at 11:50

## 2024-04-30 RX ADMIN — FENTANYL CITRATE 50 MCG: 50 INJECTION, SOLUTION INTRAMUSCULAR; INTRAVENOUS at 08:04

## 2024-04-30 RX ADMIN — ROCURONIUM BROMIDE 5 MG: 10 INJECTION, SOLUTION INTRAVENOUS at 08:04

## 2024-04-30 RX ADMIN — CEFAZOLIN 2000 MG: 225 INJECTION, POWDER, FOR SOLUTION INTRAMUSCULAR; INTRAVENOUS at 11:50

## 2024-04-30 RX ADMIN — Medication 120 MG: at 08:04

## 2024-04-30 RX ADMIN — SODIUM CHLORIDE, POTASSIUM CHLORIDE, SODIUM LACTATE AND CALCIUM CHLORIDE 9 ML/HR: 600; 310; 30; 20 INJECTION, SOLUTION INTRAVENOUS at 07:52

## 2024-04-30 RX ADMIN — Medication 100 MCG: at 12:03

## 2024-04-30 RX ADMIN — FENTANYL CITRATE 25 MCG: 50 INJECTION, SOLUTION INTRAMUSCULAR; INTRAVENOUS at 11:53

## 2024-04-30 RX ADMIN — PROPOFOL 50 MCG/KG/MIN: 10 INJECTION, EMULSION INTRAVENOUS at 08:09

## 2024-04-30 RX ADMIN — Medication 50 MCG: at 10:24

## 2024-04-30 RX ADMIN — EPHEDRINE SULFATE 5 MG: 50 INJECTION INTRAVENOUS at 09:40

## 2024-04-30 RX ADMIN — DEXMEDETOMIDINE HCL 10 MCG: 100 INJECTION INTRAVENOUS at 10:43

## 2024-04-30 RX ADMIN — PROPOFOL 150 MG: 10 INJECTION, EMULSION INTRAVENOUS at 08:04

## 2024-04-30 RX ADMIN — EPHEDRINE SULFATE 10 MG: 50 INJECTION INTRAVENOUS at 10:12

## 2024-04-30 RX ADMIN — Medication 15 MG: at 09:36

## 2024-04-30 RX ADMIN — HYDROCODONE BITARTRATE AND ACETAMINOPHEN 1 TABLET: 5; 325 TABLET ORAL at 13:30

## 2024-04-30 RX ADMIN — DIAZEPAM 10 MG: 5 TABLET ORAL at 06:31

## 2024-04-30 RX ADMIN — ONDANSETRON 4 MG: 2 INJECTION INTRAMUSCULAR; INTRAVENOUS at 08:21

## 2024-04-30 RX ADMIN — Medication 100 MCG: at 11:14

## 2024-04-30 RX ADMIN — Medication 100 MCG: at 10:38

## 2024-04-30 RX ADMIN — DEXMEDETOMIDINE HCL 10 MCG: 100 INJECTION INTRAVENOUS at 09:02

## 2024-04-30 RX ADMIN — EPHEDRINE SULFATE 5 MG: 50 INJECTION INTRAVENOUS at 09:54

## 2024-04-30 NOTE — ANESTHESIA PREPROCEDURE EVALUATION
Anesthesia Evaluation     Patient summary reviewed and Nursing notes reviewed   no history of anesthetic complications:   NPO Solid Status: > 8 hours  NPO Liquid Status: > 2 hours           Airway   Mallampati: III  TM distance: >3 FB  Neck ROM: full  Difficult intubation highly probable  Dental - normal exam   (+) implants        Pulmonary - normal exam   (+) a smoker Former,sleep apnea on CPAP  Cardiovascular - normal exam  Exercise tolerance: good (4-7 METS)    ECG reviewed  Patient on routine beta blocker and Beta blocker given within 24 hours of surgery    (+) hypertension, dysrhythmias (RBBB), hyperlipidemia  (-) angina, orthopnea, PND, HENRY      Neuro/Psych  (+) psychiatric history Anxiety and Depression  GI/Hepatic/Renal/Endo    (+) obesity, GERD (on mounjaro, LD 4/9), diabetes mellitus (A1C 8.8) type 2 poorly controlled    Musculoskeletal     Abdominal    Substance History      OB/GYN          Other      history of cancer (L breast)                  Anesthesia Plan    ASA 2     (Background propofol    I have reviewed the patient's history and chart with the patient, including all pertinent laboratory results and imaging. I have explained the risks of anesthesia including but not limited to dental damage, corneal abrasion, nerve injury, MI, stroke, aspiration, and death. Patient has agreed to proceed.      /76 (BP Location: Right arm, Patient Position: Lying)   Pulse 79   Temp 36.6 °C (97.9 °F) (Oral)   Resp 18   SpO2 97%   )  intravenous induction     Anesthetic plan, risks, benefits, and alternatives have been provided, discussed and informed consent has been obtained with: patient.      CODE STATUS:

## 2024-04-30 NOTE — H&P
Plastic Surgery History and Physical Note    Current Date: 2024  Name: Colleen Yanes  Age: 63 y.o.  Sex: female  MRN: [id number]  YOB: 1960    ?  Chief Complaint:  left breast cancer  ?  History of Present Illness:  Colleen Yanes is a female 63 y.o.  with new left breast cancer planning for left mastectomy with Dr. Amezcua and nessa recon with TE and ADM with myself.       Family History:   Family History   Problem Relation Age of Onset    Breast cancer Mother     Ovarian cancer Neg Hx     Uterine cancer Neg Hx     Colon cancer Neg Hx     Malig Hyperthermia Neg Hx      Social History:   Social History     Socioeconomic History    Marital status:    Tobacco Use    Smoking status: Former     Types: Cigarettes     Passive exposure: Never    Smokeless tobacco: Never    Tobacco comments:     quit age 35   smoked about a pack a week  for  20 years quit    Vaping Use    Vaping status: Never Used   Substance and Sexual Activity    Alcohol use: Yes     Comment: yearly 6    Drug use: Not Currently     Types: Marijuana, Cocaine(coke)     Comment: age 20    Sexual activity: Yes     Birth control/protection: Post-menopausal     Past Medical History:   Past Medical History:   Diagnosis Date    Anxiety     Depression     Diabetes mellitus     Emotional disorder 2024    recent loss of brother and diagnosis of left breast cancer    Facial basal cell cancer     using cream    GERD (gastroesophageal reflux disease)     Heart palpitations     Hemorrhoid     History of mononucleosis     Hyperlipidemia     Hypertension     Restless leg     Sleep apnea     cpap     Past Surgical History:   Past Surgical History:   Procedure Laterality Date    BREAST BIOPSY Left      AND     BREAST CYST ASPIRATION Left      SECTION      x2    COLONOSCOPY      DILATATION AND CURETTAGE      ENDOSCOPY      US GUIDED CYST ASPIRATION BREAST N/A 2024     Medications:   Current Facility-Administered  Medications:     ceFAZolin 2000 mg IVPB in 100 mL NS (MBP), 2,000 mg, Intravenous, Once, Riddhi Amezcua MD    famotidine (PEPCID) injection 20 mg, 20 mg, Intravenous, Once, Florinda Tripathi MD    lactated ringers infusion, 9 mL/hr, Intravenous, Continuous, Florinda Tripathi MD    lidocaine (LMX) 4 % cream 1 Application, 1 Application, Topical, Once, Riddhi Amezcua MD    lidocaine (XYLOCAINE) 1 % injection 0.5 mL, 0.5 mL, Intradermal, Once PRN, Florinda Tripathi MD    midazolam (VERSED) injection 1 mg, 1 mg, Intravenous, Q5 Min PRN, Florinda Tripathi MD    sodium chloride 0.9 % flush 3 mL, 3 mL, Intravenous, Q12H, Florinda Tripathi MD    sodium chloride 0.9 % flush 3-10 mL, 3-10 mL, Intravenous, PRN, Florinda Tripathi MD  Allergies:   Allergies   Allergen Reactions    Bactrim [Sulfamethoxazole-Trimethoprim] Rash    Sulfa Antibiotics Itching and Rash    Lisinopril Cough    Simvastatin Myalgia     zocor     ?  Review of Systems:   Constitutional: Negative for fevers/chills  H&N: Negative for sore throat/rhinorrhea.  Eyes: Negative for dry eye/visual disturbances.  Respiratory: Negative for cough or dyspnea.    Cardiovascular: Negative for cyanosis or chest pain.   Gastrointestinal: Negative for nausea/emesis.  Genitourinary: Negative for frequency/dysuria.   Musculoskeletal: Negative for muscle or joint pain.  Skin: Negative for new rash/lesions.  Endocrine: Negative for excessive thirst/feeling cold.  Hematology: Negative for easy bruising/bleeding.  Neurological: Negative for headaches/tinnitus.    Physical Examination:   /76 (BP Location: Right arm, Patient Position: Lying)   Pulse 79   Temp 97.9 °F (36.6 °C) (Oral)   Resp 18   SpO2 97%   There is no height or weight on file to calculate BMI.     General: Well nourished, well developed, in no acute distress  Eyes: PERRLA, EOM intact, sclerae anicteric, no conjunctival injection  HENT: Normocephalic, atraumatic, mucous membranes  "moist  Neck: Supple, no thyromegaly, no lymphadenopathy, trachea midline  Respiratory: Negative for wheezing or stridor, non-labored respirations   Cardiovascular: Normal rate, negative for cyannosis, negative for peripheral edema  Abdominal: Soft, non-distended.  Musculoskeletal: Warm and dry extremities. Negative for motor deficits. Able to stand from seated position easily.  Psychiatric: Appropriate affect, cooperative demeanor.  Neurologic: Oriented x 3, negative for gross motor or sensory deficits.  Skin: No rashes or open wounds.  Breasts: grade 3 ptosis      Labs:  Lab Results   Component Value Date    WBC 7.37 04/25/2024    HGB 12.7 04/25/2024    HCT 40.7 04/25/2024    MCV 86.8 04/25/2024     04/25/2024     Lab Results   Component Value Date    GLUCOSE 265 (H) 04/25/2024    BUN 13 04/25/2024    CREATININE 0.69 04/25/2024    EGFRIFAFRI >60 01/04/2023    BCR 18.8 04/25/2024    K 4.3 04/25/2024    CO2 24.1 04/25/2024    CALCIUM 9.6 04/25/2024    ALBUMIN 4.6 01/04/2023    LABIL2 1.7 01/04/2023    AST 22 01/04/2023    ALT 36 (H) 01/04/2023     No results found for: \"INR\", \"PROTIME\"  No results found for: \"PTT\"  Lab Results   Component Value Date    HGBA1C 8.80 (H) 04/16/2024     No results found for: \"ABORH\"      Assessment and Plan: Colleen Yanes is a 63 y.o. female with new left breast cancer planning for left mastectomy with Dr. Amezcua and nessa recon with TE and ADM with myself.     Patient was seen and examined in pre-op holding.  Risks, benefits and alternatives were previously discussed.     Procedure details, risks, and benefits were discussed with the patient and informed consent discussion was performed.  Questions answered to the patient's satisfaction.    Jaswinder Barbour MD  04/30/24  07:15 EDT           "

## 2024-04-30 NOTE — OP NOTE
BREAST MASTECTOMY WITH SENTINEL NODE BIOPSY WITH IMMEDIATE RECONSTRUCTION  Procedure Report    Patient Name:  Colleen Yanes  YOB: 1960    Date of Surgery:  4/30/2024     Indications:  multifocal left breast cancer    Pre-op Diagnosis:   Malignant neoplasm of overlapping sites of left breast in female, estrogen receptor positive [C50.812, Z17.0]       Post-Op Diagnosis Codes:     * Malignant neoplasm of overlapping sites of left breast in female, estrogen receptor positive [C50.812, Z17.0]          Procedure(s):  LEFT BREAST MASTECTOMY WITH SENTINEL NODE BIOPSY          Staff:  Surgeon(s):  Jaswinder Barbour MD Couch, Sarah, MD    Assistant: Sim Barr CSA    Anesthesia: General    Estimated Blood Loss: minimal    Implants:    Implant Name Type Inv. Item Serial No.  Lot No. LRB No. Used Action   DEV CONTRL TISS STRATAFIXSPIRALMNCRYL [id number] REV3/0 45CM - [id number] Implant DEV CONTRL TISS STRATAFIXSPIRALMNCRYL PLSPS2 REV3/0 45CM  [employer]  DIV OF J AND J [id number] Left 2 Implanted   CLIPAPPLR M/ ENDO LIGACLIP9 3/8IN MD - [id number] Implant CLIPAPPLR M/ ENDO LIGACLIP9 3/8IN MD  [employer]  DIV OF J AND J [id number] Left 1 Implanted   EXPNDR TISS BRST MODHT V/P STL 133S MV/T 500CC - [id number] - [id number] Implant EXPNDR TISS BRST MODHT V/P STL 133S MV/T 500CC [id number] [employer] NR Left 1 Implanted   GRFT TISS ALLODERM RTM PERF [id number] 2.4MM/THK .4MM - [id number] Implant GRFT TISS ALLODERM RTM PERF [id number] 2.4MM/THK .4MM  [employer] [id number] Left 1 Implanted       Specimen:          Specimens       ID Source Type Tests Collected By Collected At Frozen?    A [place] Lymph Node Tissue TISSUE PATHOLOGY EXAM   Riddhi Amezcua MD 4/30/24 0906 No    Description: LEFT SENTINEL AXILLARY LYMPH NODES    B Breast, Left Tissue TISSUE PATHOLOGY EXAM   Riddhi Amezcua MD 4/30/24 0951 No    Description: LEFT BREAST MASTECTOMY  SHORT STITCH SUPERIOR LONG STITCH LATERAL (672G)    Comment: FRESH FOR PERMANENT                 Findings: sentinel lymph nodes identified    Complications: none    Description of Procedure:     The risks and benefits of the procedure were explained in detail to the patient.  Informed consent was obtained.  Prior to arrival to the operating room, the patient had radionucleotide injected into the left breast for subsequent sentinel lymph node identification.      The patient was then brought back to the operating room suite and placed supine on the operating table. Sequential compression devices were applied to the lower extremities and perioperative antibiotics were administered.      After induction of general anesthesia the patient's chest was prepped and draped in the standard surgical fashion. Blue dye was injected in the left breast for dual dye sentinel lymph node identification. A wise pattern incision was planned encompassing the nipple areolar complex of the left breast as marked by plastic surgery and dissection was carried down to the underlying subcutaneous tissue.  Skin flaps were developed superiorly to the clavicle, inferiorly to the rectus, medially to the sternum and laterally to the latissimus dorsi.    Through the same incision, attention was drawn to the left axilla. All lymph nodes with increased radioactivity from the previously injected radiotracer, blue lymph nodes, or nodes at the end of a blue channel were removed.   Upon removal of said nodes,there was no radioactive signal within 90% of the sentinel nodes identified.  Hemostasis was confirmed.  The breast was then dissected off of the chest wall with care taken to include all of the underlying muscular fascia.      The patient was then handed off to the plastic surgery team for immediate reconstruction.          [place] Node Biopsy for Breast Cancer - Left  Operation performed with curative intent. Yes   Tracer(s) used to identify  sentinel nodes in the upfront surgery (non-neoadjuvant) setting (select all that apply). Dye and Radioactive tracer   Tracer(s) used to identify sentinel nodes in the neoadjuvant setting (select all that apply). N/A   All nodes (colored or non-colored) present at the end of a dye-filled lymphatic channel were removed. Yes   All significantly radioactive nodes were removed. Yes   All palpably suspicious nodes were removed. Yes   Biopsy-proven positive nodes marked with clips prior to chemotherapy were identified and removed. N/A          Assistant: Sim Barr CSA  was responsible for performing the following activities: Retraction, Suction, and Irrigation and their skilled assistance was necessary for the success of this case.    Riddhi Amezcua MD     Date: 4/30/2024  Time: 10:27 EDT

## 2024-04-30 NOTE — OP NOTE
Operative Report  Date of surgery: 4/30/2024      PREOPERATIVE DIAGNOSIS: Left breast cancer  POSTOPERATIVE DIAGNOSIS: Same as preop      OPERATION PERFORMED:   Immediate breast reconstruction with Goldilocks reconstruction  Tissue expander placement, pre-pectoral  Acellular Dermal Matrix (ADM) placement  4.   Intraoperative SPY angiography    ANESTHESIA: General, local with Exparel     SURGEON: Jaswinder Barbour MD       ASSISTANT: None     Indications for procedure:  Colleen Yanes is a 63 y.o. female with Left breast cancer (invasive ductal carcinoma) seen by Dr. Amezcua for her breast oncology and plan for Left skin sparing mastectomy with SNL biopsy. Based on her preoperative exam and her health history, decision was made to proceed with goldilocks mastectomy and tissue expander placement for her reconstruction. The risks, benefits and alternatives of the procedure were discussed in detail with the patient and she elected to proceed. She was marked in the preop area in the upright position.      Procedure narrative:   Patient underwent injection for sentinel node biopsy then was taken to the OR and underwent Left breast mastectomy and sentinel lymph node biopsy. Once Dr. Amezcua had completed her portion of the case I began with reconstruction of the breast defect.      Upon examination of the breast cavity, the mastectomy flaps were pink and viable overall with no real areas of concern and overall robust and viable. The weight of resection of the breast specimen was 672g. I measured the defect to ensure the correct size tissue expander was utilized. I selected the Natrelle, Moderate height, variable projection smooth tissue expander, 500cc volume.      I recreated the lateral breast border and closed down the lateral gutter with 2-0 PDS to recreate lateral breast fold and obliterate dead space between breast and axillary dissection. Next I injected Exparel mixture as a field block into pectoralis, serratus, and the  mastectomy flaps for postoperative pain control. (Exparel mixture consisted of 20cc Exparel, 20cc 0.25% marcaine plain for a total of 40cc) All 40cc of this mixture was infiltrated into the operated breast. I placed a 15F sonia drain in lateral axilla and breast cavity and secured in place with 2-0 nylon suture. I then performed irrigation and confirmed hemostasis and set a moist lap pad in the breast cavity while I de-epithelialized the inferior breast flap to used as a dermal pedicle for additional coverage and support over the tissue expander/ADM construct. This was performed with a #10 scalpel then hemostasis obtained with bovie electrocautery.     I then turned my attention to the back table to prepare the tissue expander. I changed my gloves prior to handling the ADM and TE. A thick piece of Alloderm was selected and the expander was carefully wrapped anteriorly to posteriorly and secured with 2-0 PDS sutures on the posterior aspect of the expander. This construct was then placed in dilute betadine solution with double antibiotic solution.     Prior to placing the expander in the cavity, I performed SPY angiography of the skin flaps and the dermal flap to ensure viability. The areas of questionable viability were directly excised with facelift scissors. I then performed copious irrigation of the pocket with dilute betadine solution followed by double antibiotic solution with ancef and gentamicin.    The breast skin was then cleansed with betadine lap and my gloves were changed once again. The expander was then carefully placed into the chest cavity and secured into place using tabs at 12 o'clock, 6 o'clock and 8 o'clock and 2 o'clock positions to prevent rotation. I did place the expander in the pre-pectoral plane. Once the TE had been placed and determined that the mastectomy flaps would close easily without tension, I tailor tacked the skin edges with a skin stapler and proceeded with closure in layers. The  incisions were closed with 3-0 monocryl in deep dermis followed by 3-0 stratafix running subcuticular suture.     I performed one final SPY angiography of the breast flaps to ensure viability at the conclusion. Breasts were cleansed and dried and dressings applied. Exofin closure system to incisions, followed by fluffed 4x4 gauze, unrolled kerlex and an ACE. Biopatch and tegaderm to drain site. I also applied full kerlex roll to the operative axilla to apply slightly more compression laterally.      Patient tolerated the procedure well and was taken to PACU in stable condition for planned discharge home.      Implant information:   Tissue expander: Natrelle smooth (moderate height, variable projection) 500cc,   REF: 133S-MV-14T; SN: [id number]    Alloderm: Alloderm Select, Perforated, 58r37bj square, thick;   REF: [id number]; Lot: [id number]      EBL: 50cc    Complications: none     Specimens: Left breast skin (de-epithelialized inferior portion of breast), additional left breast tissue (from superior edge of dermal pedicle)     Drains: 15F Jaswinder drain Left breast/ axilla

## 2024-04-30 NOTE — DISCHARGE INSTRUCTIONS
Follow the post-operative and activity instructions you received from Dr Barbour in the office.     You received a Hydrocodone pain pill at 1:30pm. Follow the instructions given to you in the office regarding pain medications.

## 2024-04-30 NOTE — ANESTHESIA PROCEDURE NOTES
Airway  Date/Time: 4/30/2024 8:06 AM    Indications and Patient Condition  Indications for airway management: airway protection    Preoxygenated: yes  Mask difficulty assessment: 1 - vent by mask    Final Airway Details  Final airway type: endotracheal airway      Successful airway: ETT  Cuffed: yes   Successful intubation technique: direct laryngoscopy  Facilitating devices/methods: intubating stylet  Endotracheal tube insertion site: oral  Blade: Choe  Blade size: 2  ETT size (mm): 7.5  Cormack-Lehane Classification: grade IIa - partial view of glottis  Placement verified by: chest auscultation and capnometry   Measured from: lips  ETT/EBT  to lips (cm): 22  Number of attempts at approach: 1  Assessment: lips, teeth, and gum same as pre-op and atraumatic intubation

## 2024-04-30 NOTE — ANESTHESIA POSTPROCEDURE EVALUATION
Patient: Colleen Yanes    Procedure Summary       Date: 04/30/24 Room / Location: [facility] OR [address] [place] [address] MAIN OR    Anesthesia Start: 0756 Anesthesia Stop: 1234    Procedures:       LEFT BREAST MASTECTOMY WITH SENTINEL NODE BIOPSY (Left: Breast)      Left BREAST RECONSTRUCTION-GOLDILOCKS, TISSUE EXPANDER PLACEMENT WITH ACELLULAR DERMAL MATRIX (ADM) (Left: Breast) Diagnosis:       Malignant neoplasm of overlapping sites of left breast in female, estrogen receptor positive      (Malignant neoplasm of overlapping sites of left breast in female, estrogen receptor positive [C50.812, Z17.0])    Surgeons: Riddhi Amezcua MD; Jaswinder Barbour MD Provider: Carlos Mcmullen MD    Anesthesia Type: Not recorded ASA Status: 2            Anesthesia Type: No value filed.    Vitals  Vitals Value Taken Time   /64 04/30/24 1315   Temp 37.3 °C (99.1 °F) 04/30/24 1230   Pulse 95 04/30/24 1330   Resp 16 04/30/24 1315   SpO2 98 % 04/30/24 1330   Vitals shown include unfiled device data.        Post Anesthesia Care and Evaluation    Patient location during evaluation: PACU  Level of consciousness: awake  Pain management: adequate    Airway patency: patent  Anesthetic complications: No anesthetic complications  PONV Status: controlled  Cardiovascular status: acceptable  Respiratory status: acceptable  Hydration status: acceptable    Comments: /64   Pulse 95   Temp 37.3 °C (99.1 °F) (Oral)   Resp 16   SpO2 97%

## 2024-04-30 NOTE — BRIEF OP NOTE
BRIEF OP NOTE    Colleen Yanes  DOS: 4/30/2024    Pre-op Diagnosis:   Malignant neoplasm of overlapping sites of left breast in female, estrogen receptor positive [C50.812, Z17.0]       Post-Op Diagnosis Codes:     * Malignant neoplasm of overlapping sites of left breast in female, estrogen receptor positive [C50.812, Z17.0]    Procedure(s):  1. Left BREAST RECONSTRUCTION-GOLDILOCKS  2. TISSUE EXPANDER PLACEMENT, prepectoral  3. ACELLULAR DERMAL MATRIX (ADM) placement  4. SPY angiography    Surgeon: Jaswinder Barbour MD     Anesthesia: General    Staff:   Circulator: Lorena Zapine RN; Sosa Gaines RN  Scrub Person: Graciela Jhaveri  Orientee: Yumiko Barone RN    Estimated Blood Loss: 50 mL    Urine Voided: 515 mL    Specimens:                Specimens       ID Source Type Tests Collected By Collected At Frozen?    A [place] Lymph Node Tissue TISSUE PATHOLOGY EXAM   Riddhi Amezcua MD 4/30/24 0906 No    Description: LEFT SENTINEL AXILLARY LYMPH NODES    B Breast, Left Tissue TISSUE PATHOLOGY EXAM   Riddhi Amezcua MD 4/30/24 0951 No    Description: LEFT BREAST MASTECTOMY SHORT STITCH SUPERIOR LONG STITCH LATERAL (672G)    Comment: FRESH FOR PERMANENT     C Breast, Left Tissue TISSUE PATHOLOGY EXAM   Jaswinder Barbour MD 4/30/24 1041 No    Description: LEFT BREAST SKIN    D Breast, Left Tissue TISSUE PATHOLOGY EXAM   Jaswinder Barbour MD 4/30/24 1115 No    Description: LEFT BREAST ADDITIONAL TISSUE              Drains:   Closed/Suction Drain 1 Left Breast Bulb 19 Fr. (Active)   Site Description Unable to view 04/30/24 1447   Dressing Status Clean;Dry;Intact 04/30/24 1447   Drainage Appearance Serosanguineous 04/30/24 1447   Status To bulb suction 04/30/24 1447   Output (mL) 60 mL 04/30/24 1325       [REMOVED] Urethral Catheter Silicone 16 Fr. (Removed)     Findings: flaps viable at conclusion of case    Intraoperative fill: ZERO    Complications: none    Implanted Devices:  Natrelle Tissue Expander, Smooth  Moderate Height, Variable Projection  REF: 133S-MV-14T; SN: [id number]  Alloderm Select: LOT: [id number]; REF: [id number]    Jaswinder Barbour MD     Date: 4/30/2024  Time: 16:18 EDT

## 2024-05-02 ENCOUNTER — TELEPHONE (OUTPATIENT)
Dept: SURGERY | Facility: CLINIC | Age: 64
End: 2024-05-02
Payer: COMMERCIAL

## 2024-05-02 DIAGNOSIS — C50.812 MALIGNANT NEOPLASM OF OVERLAPPING SITES OF LEFT BREAST IN FEMALE, ESTROGEN RECEPTOR POSITIVE: Primary | ICD-10-CM

## 2024-05-02 DIAGNOSIS — Z17.0 MALIGNANT NEOPLASM OF OVERLAPPING SITES OF LEFT BREAST IN FEMALE, ESTROGEN RECEPTOR POSITIVE: Primary | ICD-10-CM

## 2024-05-02 NOTE — TELEPHONE ENCOUNTER
Called Colleen Yanes to discuss pathology results.  All questions answered.  I will follow up with her in clinic as scheduled.    Referrals placed to: Medical oncology   Oncotype ordered.      Riddhi Amezcua MD  Breast Surgical Oncology

## 2024-05-03 ENCOUNTER — PATIENT OUTREACH (OUTPATIENT)
Dept: OTHER | Facility: HOSPITAL | Age: 64
End: 2024-05-03
Payer: COMMERCIAL

## 2024-05-03 LAB
LAB AP CASE REPORT: NORMAL
LAB AP CASE REPORT: NORMAL
LAB AP DIAGNOSIS COMMENT: NORMAL
LAB AP DIAGNOSIS COMMENT: NORMAL
LAB AP SPECIAL STAINS: NORMAL
LAB AP SPECIAL STAINS: NORMAL
LAB AP SYNOPTIC CHECKLIST: NORMAL
LAB AP SYNOPTIC CHECKLIST: NORMAL
PATH REPORT.ADDENDUM SPEC: NORMAL
PATH REPORT.ADDENDUM SPEC: NORMAL
PATH REPORT.FINAL DX SPEC: NORMAL
PATH REPORT.FINAL DX SPEC: NORMAL
PATH REPORT.GROSS SPEC: NORMAL
PATH REPORT.GROSS SPEC: NORMAL

## 2024-05-03 NOTE — PROGRESS NOTES
Called Ms. Yanes to see how she was doing. She stated she is recovering from surgery well and has no needs at this time. She has a question about her lymph nodes out for Dr. Amezcua to discuss with her. Otherwise she has no needs. She was thankful for the call and will reach out if any questions or needs arise.

## 2024-05-13 LAB
LAB AP CASE REPORT: NORMAL
LAB AP CASE REPORT: NORMAL
LAB AP DIAGNOSIS COMMENT: NORMAL
LAB AP DIAGNOSIS COMMENT: NORMAL
LAB AP SPECIAL STAINS: NORMAL
LAB AP SPECIAL STAINS: NORMAL
LAB AP SYNOPTIC CHECKLIST: NORMAL
LAB AP SYNOPTIC CHECKLIST: NORMAL
Lab: NORMAL
Lab: NORMAL
PATH REPORT.ADDENDUM SPEC: NORMAL
PATH REPORT.ADDENDUM SPEC: NORMAL
PATH REPORT.FINAL DX SPEC: NORMAL
PATH REPORT.FINAL DX SPEC: NORMAL
PATH REPORT.GROSS SPEC: NORMAL
PATH REPORT.GROSS SPEC: NORMAL

## 2024-05-13 NOTE — PROGRESS NOTES
Breast Surgical Oncology Post-Op Visit    Surgery:  left mastectomy and sentinel lymph node biopsy  Subjective: No acute issues. Denies f/c/wnd issues. Intermittent use of pain medication.     O:  Vitals:    05/14/24 0856   BP: 124/62   Pulse: 94   SpO2: 100%     Breast incision:  healing well without evidence of infection or significant seroma, goldilocks mastectomy incision    Pathology:   4/30  1.  [place] lymph nodes, left axilla, excision:               -1 lymph node, negative for metastatic carcinoma (0/1).     2.  Left breast, oriented simple skin sparing mastectomy (672 g): MULTIFOCAL INVASIVE MAMMARY CARCINOMA (INVASIVE DUCTAL CARCINOMA AND INVASIVE LOBULAR CARCINOMA).  -Histologic grade: Tumor #1 invasive lobular (tubules = 3, nuclei = 2, mitosis = 1); tumor #2 invasive ductal (tubules = 3, nuclei = 3, mitosis = 1).               -Tumor sizes: Tumor #1 30 x 13 x 10 mm; tumor #2 15 x 7 x 7 mm.               -Tumor sites: Tumor #1 12:00, 6.5 cm from nipple; tumor #2 6:00, 5 cm from nipple.               -Associated atypical lobular hyperplasia (ALH) and lobular carcinoma in situ (LCIS) present near tumor #1.               -No definitive residual ductal carcinoma in situ (DCIS) identified.               -All margins are free of invasive carcinoma: Anterior - 1.5 mm (tumor #2, closest margin).                                                                                     Posterior - 30 mm (tumor #2).                                                                                     Inferior - 40 mm (tumor #2).                                                                                     Superior - 50 mm (tumor #1).                                                                                     Medial - 70 mm (tumor #1).                                                                                     Lateral - 100 mm (tumor #2).               -No definitive lymphovascular space invasion  identified.               -Tumor #2 demonstrates perineural invasion.               -Hormone receptors: Tumor #1 ER and CT % positive, HER2 1+ negative, Ki67 8%; tumor #2 ER   % positive, CT 81-90% positive, HER2 1+ negative, Ki67 18% (BV ).    -Pathologic stage: mpT2, N(sn)0.  -See synoptic template.     3.  Skin, left breast, plastics repair:               -Benign unremarkable skin.     4.  Left breast, additional excised tissue:               -Benign unremarkable breast parenchyma.  Assessment: s/p Left mastectomy and [place] lymph node biopsy for  pathologic Anatomic Stage IIA, Prognostic Stage IA ([id number], ER+/CT+/Her2-) multifocal invasive lobular and invasive ductal breast cancer recovering well.  Discussed pathology at length as well as planned/possible adjuvant treatments.  All questions answered.    Plan:   - RTC 6 months  - Next imaging due - right mammogram 3/25 at [facility].   - Med Onc - has appointment with Dr. Smith 5/22, Oncotype is 15.   - Rad Onc deferred  - during her cancer diagnosis her brother suddenly passed away, she is struggling with grief of her loss and grief 2/2 to diagnosis. Has appointment with counseling services.  - continue supportive services with nurse navigation.       Riddhi Amezcua MD  Breast Surgical Oncology

## 2024-05-14 ENCOUNTER — OFFICE VISIT (OUTPATIENT)
Dept: SURGERY | Facility: CLINIC | Age: 64
End: 2024-05-14
Payer: COMMERCIAL

## 2024-05-14 VITALS
HEART RATE: 94 BPM | WEIGHT: 172.6 LBS | OXYGEN SATURATION: 100 % | HEIGHT: 64 IN | DIASTOLIC BLOOD PRESSURE: 62 MMHG | SYSTOLIC BLOOD PRESSURE: 124 MMHG | BODY MASS INDEX: 29.47 KG/M2

## 2024-05-14 DIAGNOSIS — C50.812 MALIGNANT NEOPLASM OF OVERLAPPING SITES OF LEFT BREAST IN FEMALE, ESTROGEN RECEPTOR POSITIVE: Primary | ICD-10-CM

## 2024-05-14 DIAGNOSIS — Z17.0 MALIGNANT NEOPLASM OF OVERLAPPING SITES OF LEFT BREAST IN FEMALE, ESTROGEN RECEPTOR POSITIVE: Primary | ICD-10-CM

## 2024-05-14 PROCEDURE — 99024 POSTOP FOLLOW-UP VISIT: CPT | Performed by: STUDENT IN AN ORGANIZED HEALTH CARE EDUCATION/TRAINING PROGRAM

## 2024-05-14 RX ORDER — TRAMADOL HYDROCHLORIDE 50 MG/1
TABLET ORAL
COMMUNITY
Start: 2024-04-24

## 2024-05-14 NOTE — LETTER
May 14, 2024       No Recipients    Patient: Colleen Yanes   YOB: 1960   Date of Visit: 5/14/2024     Dear Temo Schustre MD:       Thank you for referring Colleen Yanes to me for evaluation. Below are the relevant portions of my assessment and plan of care.    If you have questions, please do not hesitate to call me. I look forward to following Colleen along with you.         Sincerely,        Riddhi Amezcua MD        CC:   No Recipients    Riddhi Amezcua MD  05/14/24 0943  Sign when Signing Visit  Breast Surgical Oncology Post-Op Visit    Surgery:  left mastectomy and sentinel lymph node biopsy  Subjective: No acute issues. Denies f/c/wnd issues. Intermittent use of pain medication.     O:  Vitals:    05/14/24 0856   BP: 124/62   Pulse: 94   SpO2: 100%     Breast incision:  healing well without evidence of infection or significant seroma, goldilocks mastectomy incision    Pathology:   4/30  1.  [place] lymph nodes, left axilla, excision:               -1 lymph node, negative for metastatic carcinoma (0/1).     2.  Left breast, oriented simple skin sparing mastectomy (672 g): MULTIFOCAL INVASIVE MAMMARY CARCINOMA (INVASIVE DUCTAL CARCINOMA AND INVASIVE LOBULAR CARCINOMA).  -Histologic grade: Tumor #1 invasive lobular (tubules = 3, nuclei = 2, mitosis = 1); tumor #2 invasive ductal (tubules = 3, nuclei = 3, mitosis = 1).               -Tumor sizes: Tumor #1 30 x 13 x 10 mm; tumor #2 15 x 7 x 7 mm.               -Tumor sites: Tumor #1 12:00, 6.5 cm from nipple; tumor #2 6:00, 5 cm from nipple.               -Associated atypical lobular hyperplasia (ALH) and lobular carcinoma in situ (LCIS) present near tumor #1.               -No definitive residual ductal carcinoma in situ (DCIS) identified.               -All margins are free of invasive carcinoma: Anterior - 1.5 mm (tumor #2, closest margin).                                                                                     Posterior - 30 mm (tumor  #2).                                                                                     Inferior - 40 mm (tumor #2).                                                                                     Superior - 50 mm (tumor #1).                                                                                     Medial - 70 mm (tumor #1).                                                                                     Lateral - 100 mm (tumor #2).               -No definitive lymphovascular space invasion identified.               -Tumor #2 demonstrates perineural invasion.               -Hormone receptors: Tumor #1 ER and [place] % positive, HER2 1+ negative, Ki67 8%; tumor #2 ER   % positive, [place] 81-90% positive, HER2 1+ negative, Ki67 18% (BV ).    -Pathologic stage: mpT2, N(sn)0.  -See synoptic template.     3.  Skin, left breast, plastics repair:               -Benign unremarkable skin.     4.  Left breast, additional excised tissue:               -Benign unremarkable breast parenchyma.  Assessment: s/p Left mastectomy and [place] lymph node biopsy for  pathologic Anatomic Stage IIA, Prognostic Stage IA ([id number], ER+/KY+/Her2-) multifocal invasive lobular and invasive ductal breast cancer recovering well.  Discussed pathology at length as well as planned/possible adjuvant treatments.  All questions answered.    Plan:   - RTC 6 months  - Next imaging due - right mammogram 3/25 at [facility].   - Med Onc - has appointment with Dr. Smith 5/22, Oncotype is 15.   - Rad Onc deferred  - during her cancer diagnosis her brother suddenly passed away, she is struggling with grief of her loss and grief 2/2 to diagnosis. Has appointment with counseling services.  - continue supportive services with nurse navigation.       Riddhi Amezcua MD  Breast Surgical Oncology

## 2024-05-16 ENCOUNTER — OFFICE VISIT (OUTPATIENT)
Dept: PSYCHIATRY | Facility: HOSPITAL | Age: 64
End: 2024-05-16
Payer: COMMERCIAL

## 2024-05-16 DIAGNOSIS — F33.1 MAJOR DEPRESSIVE DISORDER, RECURRENT EPISODE, MODERATE: Primary | ICD-10-CM

## 2024-05-16 DIAGNOSIS — F51.01 PRIMARY INSOMNIA: ICD-10-CM

## 2024-05-16 DIAGNOSIS — F41.1 GENERALIZED ANXIETY DISORDER: ICD-10-CM

## 2024-05-16 DIAGNOSIS — C50.812 MALIGNANT NEOPLASM OF OVERLAPPING SITES OF LEFT BREAST IN FEMALE, ESTROGEN RECEPTOR POSITIVE: ICD-10-CM

## 2024-05-16 DIAGNOSIS — Z17.0 MALIGNANT NEOPLASM OF OVERLAPPING SITES OF LEFT BREAST IN FEMALE, ESTROGEN RECEPTOR POSITIVE: ICD-10-CM

## 2024-05-16 DIAGNOSIS — Z63.4 BEREAVEMENT: ICD-10-CM

## 2024-05-16 DIAGNOSIS — F41.0 PANIC DISORDER: ICD-10-CM

## 2024-05-16 PROBLEM — F43.21 GRIEF REACTION: Status: ACTIVE | Noted: 2024-05-16

## 2024-05-16 PROBLEM — F43.20 GRIEF REACTION: Status: ACTIVE | Noted: 2024-05-16

## 2024-05-16 RX ORDER — LORAZEPAM 0.5 MG/1
TABLET ORAL
Qty: 60 TABLET | Refills: 0 | Status: SHIPPED | OUTPATIENT
Start: 2024-05-16

## 2024-05-16 RX ORDER — GABAPENTIN 600 MG/1
600 TABLET ORAL NIGHTLY PRN
Qty: 30 TABLET | Refills: 2 | Status: SHIPPED | OUTPATIENT
Start: 2024-05-16

## 2024-05-16 RX ORDER — VENLAFAXINE HYDROCHLORIDE 150 MG/1
150 CAPSULE, EXTENDED RELEASE ORAL DAILY
Qty: 30 CAPSULE | Refills: 2 | Status: SHIPPED | OUTPATIENT
Start: 2024-05-16 | End: 2024-08-14

## 2024-05-16 SDOH — SOCIAL STABILITY - SOCIAL INSECURITY: DISSAPEARANCE AND DEATH OF FAMILY MEMBER: Z63.4

## 2024-05-16 NOTE — PATIENT INSTRUCTIONS
[facility]  [address]  [place], [place]   [phone]    Please see below and attached for additional resources:  Group, individual (for the patient and family members), and family therapy    [employer]    [email or web address]/    Phone number: [phone]    Support for patients: matched with someone with a similar diagnosis and in survivorship    Friend's for Life  https://www.lymgqd3qtci.org/    Self-referrals for wanting a primary care doctor or a specialist  PCP [phone]  Specialist [phone]  Bereavement Support Groups in the [place] Area  Grief Share: https://www.griefshare.org/countries/us/[facility]/ky/[facility]/[place]  *If you go to the website, you can click on the specific group date and it will give additional info about the groups*  Therapy resources  Chronic Illness counseling center  www.[facility]  [address] , [address]  [place] [address]  [facility]: phone # [phone]    Two Locations:    [address]  [place] [address]      [address], [address]  [place] [address]  FREE Massages  Patients are allowed some massages for FREE through the supportive oncology department. Massages at the cancer center and are done by Vandana. She does her own scheduling.  Office:  [phone]    Mobile:  [phone]  FREE exercise program  Free exercise program [employer] program through the [employer]:  https://www.[employer].org/what-we-do/healthy-living/fitness/livestrong  Chiropractic services ([facility])  [employer]  [phone]  Accupuncture  Dr. Marni Santana does Accupuncture  [facility] PRIMARY [facility]ARE  [address] [place] [address] [phone] phone  [phone] fax    Suicide / Crisis Hotline  Call: 988    OR   Go online and use their text / chat function - 988 Suicide & Crisis Lifeline - Call. Text. Chat.  ([employer].org)  Unified Protocol Safety Plan lesson  National Suicide Hotline Number (747-855-7712)  National safety text line (Text HOME to 819869)    [facility]: Alcohol / Substance Use and Dual Diagnosis Management  [address] [place] [address], [place], [place], [address]  [phone]  OR (214) [phone] https://[employer]/locations-and-payment/  Domestic Violence  8(411) 720-0454 (SAFE)                            www.kcadv.org   Sexual Assault Crisis Centers (for children and adults)  5(400) 285-8934 (HOPE)  Genesight testing  https://Marfeel/gene-test-mental-health-medications/?utm_source=vane&utm_medium=cpc&utm_campaign=882385958&utm_content=9185597253506042&utm_term=genesight%20testing&utm_source=vane&utm_medium=cpc&utm_campaign=branded&utm_content=2461538320953141&utm_term=genesight%20testing&byuyemu=[id number]

## 2024-05-16 NOTE — PROGRESS NOTES
"[facility]  In person follow up session - The primary office location is [facility].     Subjective  Patient ID: Colleen Yanes is a 63 y.o. female is seen face to face in the [facility] ([facility]. The patient is currently in survivorship of left breast cancer, status post  left mastectomy and sentinel lymph node biopsy on 04/30/24.  She is followed by Dr. Riddhi Amezcua and Dr. Jaswinder Barbour    CC: No change since last visit really.  Mildly decreased anxiety.  Continues to feel sadness, is still overwhelmed with provider overload and cancer diagnosis, grieving several recent losses, and night sweats still.     Last seen in person: 04/24/2024    HPI/ Interval History: This is a follow up medication management visit and re-evaluation of ongoing issues with anxiety, sadness, feeling overwhelmed with provider overload and cancer diagnosis, grieving several recent losses, and night sweats.  She had her surgery on 4/30/2024 and feels she has been doing fairly well.  She had her first expander fill and her postoperative pain has been quite manageable with a combination of gabapentin, Cerebyx, and Tylenol.  She denies any current \"pain,\" but admits to some discomfort. She endorses that she does not need any chemotherapy, but will need to take a pill.  She reports that there was 1 small area of the tumor that branched out, so the doctor is now considering some radiation, which she was not expecting initially. She is supposed to see her oncologist on May 22 and will discuss this further with them.     She admits to still having poor sleep still, averaging 4-5 hours per night, which is her baseline, but does not feel rested upon awakening. She remains compliant with her cpap. No issues with initiation, but continues to have issues with fragmentation.  She has had improved interest and motivation in things she previously enjoyed. Her son and " "daughter-in-law are getting ready to have their second child, so she is looking forward to that. She has been staying distracted and keeping busy with things like taking some items to [employer] and then going to [email or web address], but also recognized when tired so she went home after about 20 minutes.  She also went to dinner with her  the other day.  She is learning to budget her energy and has noticed feeling more tired lately.  Her concentration and attention are okay for brief moments, but for any length of time, she is having some difficulty with tasks and gets bored quickly.      She has been on Mounjaro and has a reduced appetite for this reason.  She gradually has an increased appetite the further out she gets from her injections and then has a reduced appetite when she has a new injection.  She endorses that her mood is labile, feeling okay at 1 moment and then 30 minutes later feeling sad and tearful.  She endorses some recent tearfulness while she was shopping at [employer] on Monday and saw a little elderly lady wearing brittney pants because it reminded her of her mother and missing her.  She felt like crying was a cathartic and believes she continues to grieve the loss of her mother, which she never really did when her mother  and feels like her brother's recent and sudden death in March has retriggered and process grief with losing her mother.  She denies any passive or active SI, plan or intent, HI, or AVH.  She also denies any symptoms of faisal, hypomania, delusional thinking, or psychosis. She continues to feel she has good support with her  and children.     Records reviewed.    PHQ-9 Total Score: 11 (previously scored 15 at her last visit on 2024)  Reports a score of \"0\" to question 9.    GAD7 Total Score: 14    Objective   Mental Status Exam  Appearance:  clean and casually dressed, appropriate and neat   Attitude toward clinician:  cooperative and agreeable , good eye contact  Speech: " "   Rate:  regular rate and rhythm   Volume:  normal  Motor:  no abnormal movements present  Mood:  \"Labile\" \" I feel okay 1 minute and 30 minutes later I am crying\"  Affect: Sad, anxious, tearful at times mood congruent  Thought Processes:  linear, logical, and goal directed and associations intact  Thought Content:  normal  Suicidal Thoughts:  absent  Homicidal Thoughts:  absent  Perceptual Disturbance: no perceptual disturbance  Attention and Concentration:  good  Insight and Judgement:  good  Memory:  memory appears to be intact    Gait: Within normal limits  Assistive devices: None    Exam: As above  Current Documented Allergies & Reactions  Allergies   Allergen Reactions    Bactrim [Sulfamethoxazole-Trimethoprim] Rash    Sulfa Antibiotics Itching and Rash    Lisinopril Cough    Simvastatin Myalgia     zocor       Current Psychotropic Medications:    LORazepam (Ativan) 0.5 MG tablet, Take 1/2 to 1 tablet by mouth up to twice daily as needed for anxiety    gabapentin (NEURONTIN) 600 MG tablet, Take 1 tablet by mouth At Night As Needed (take 1/2 to 1 tablet by mouth at bedtime as needed for pain control, anxiety, and sleep)    pramipexole (MIRAPEX) 0.5 MG tablet, Take 1 tablet by mouth Every Night - followed by her PCP    venlafaxine XR (Effexor XR) 150 MG 24 hr capsule, Take 1 capsule by mouth Daily      Plan of Care/ Medical Decision Making  1) Will continue titration of her venlafaxine XR. She tolerated her increase of venlafaxine to 37.5 mg + 75 mg without any side effects or issues. She does not feel there has been much of a change in her symptoms other than mild decrease in anxiety. Will increase to 150 mg by mouth daily.  2) The patient has been taking gabapentin 300 mg 3 times daily through her breast surgeon to help with her pain control.  She has found this helpful.  She denies feeling sleepy with this.  Will do a trial of gabapentin 600 mg by mouth at night to see if this might help with her sleep " fragmentation and ruminative worry.  3) She denies having started prazosin, which has been prescribed to her in the past by her PCP for physical symptoms of anxiety and nightmares.  She plans to wait on this medication. She has also been seeing Dr. Temo Schuster for about 6 months, doing some self-instructed CBT-I. Will encourage her to continue utilizing non-pharmacological means for sleep regulation as well.  4) Will update her labs, since they have not been drawn in awhile, to include Thyroid panel, B12 and folate panel, and Vitamin D 25 hydroxy to rule out any medical contributions to her current mood.  5) Discussed grief counseling. She expressed feeling some sadness and a sense of guilt that interferes with her sleep, making it difficult to shut her mind off and stay asleep. She feels she has some unprocessed grief from when her mother , further complicated and retriggered by the sudden death of her brother in 2024 and her brother-in-law in 2024, while also trying to cope with her new diagnosis of breast cancer. The patient feels like she would benefit from some one on one grief counseling.  We discussed utilization of supportive oncology resources or [facility].  She would like to utilize supportive oncology resources.  Will submit a referral for grief counseling with Riddhi Mora LCSW.  6) Provided supportive talk therapy through active listening, empathy, reflection, normalization of feelings, and positive reframing. Discussed current methods of coping.   7) Encouraged ongoing talk therapy with this provider or a trusted source of support. Will continue to provide additional resources in the after visit summary, patient instructions.  8) Notify provider if having any questions, concerns, or issues.    Follow up with YONATHAN Yost, 2 to 4 weeks and as needed.    Diagnoses and all orders for this visit:    1. Major depressive disorder, recurrent episode, moderate  (Primary)  -     venlafaxine XR (Effexor XR) 150 MG 24 hr capsule; Take 1 capsule by mouth Daily for 90 days.  Dispense: 30 capsule; Refill: 2    2. Generalized anxiety disorder  -     venlafaxine XR (Effexor XR) 150 MG 24 hr capsule; Take 1 capsule by mouth Daily for 90 days.  Dispense: 30 capsule; Refill: 2  -     gabapentin (NEURONTIN) 600 MG tablet; Take 1 tablet by mouth At Night As Needed (take 1/2 to 1 tablet by mouth at bedtime as needed for pain control, anxiety, and sleep).  Dispense: 30 tablet; Refill: 2  -     LORazepam (Ativan) 0.5 MG tablet; Take 1/2 to 1 tablet by mouth up to twice daily as needed for anxiety  Dispense: 60 tablet; Refill: 0    3. Panic disorder    4. Primary insomnia  -     gabapentin (NEURONTIN) 600 MG tablet; Take 1 tablet by mouth At Night As Needed (take 1/2 to 1 tablet by mouth at bedtime as needed for pain control, anxiety, and sleep).  Dispense: 30 tablet; Refill: 2    5. Bereavement    6. Malignant neoplasm of overlapping sites of left breast in female, estrogen receptor positive    I spent 41 minutes caring for Colleen on this date of service. This time includes time spent by me in the following activities: preparing for the visit, obtaining and/or reviewing a separately obtained history, performing a medically appropriate examination and/or evaluation, and counseling and educating the patient/family/caregiver.   Provided supportive therapy through active listening and therapeutic alliance. Addressed the patient's current stressors and feelings. Affirmed and encouraged ongoing communication with sources of support and treatment team.  Discussed current methods of coping. Provided additional options for sources of support through community resources and alternative treatment options.

## 2024-05-17 ENCOUNTER — TELEPHONE (OUTPATIENT)
Dept: PSYCHIATRY | Facility: HOSPITAL | Age: 64
End: 2024-05-17
Payer: COMMERCIAL

## 2024-05-17 NOTE — TELEPHONE ENCOUNTER
5/17/24 @ 1225 pm est    Received a secure [employer] message from the patient stating she was told by her pharmacy that she was requesting a refill on her ativan too early and that she would not be able to get it until 5/28 unless her prescriber gave permission for an early refill. She states she only has 2 pills left and has been taking one at bedtime to help her sleep.     Called her pharmacy at [facility] to clarify the supply she was initially given.

## 2024-05-19 PROBLEM — F41.0 PANIC DISORDER: Status: ACTIVE | Noted: 2024-05-19

## 2024-05-19 PROBLEM — F51.01 PRIMARY INSOMNIA: Status: ACTIVE | Noted: 2024-05-19

## 2024-05-19 PROBLEM — Z63.4 BEREAVEMENT: Status: ACTIVE | Noted: 2024-05-16

## 2024-05-19 PROBLEM — R52 PAIN: Status: ACTIVE | Noted: 2024-05-19

## 2024-05-22 ENCOUNTER — CONSULT (OUTPATIENT)
Dept: ONCOLOGY | Facility: CLINIC | Age: 64
End: 2024-05-22
Payer: COMMERCIAL

## 2024-05-22 ENCOUNTER — LAB (OUTPATIENT)
Dept: LAB | Facility: HOSPITAL | Age: 64
End: 2024-05-22
Payer: COMMERCIAL

## 2024-05-22 VITALS
DIASTOLIC BLOOD PRESSURE: 73 MMHG | SYSTOLIC BLOOD PRESSURE: 120 MMHG | RESPIRATION RATE: 18 BRPM | TEMPERATURE: 98.5 F | OXYGEN SATURATION: 98 % | BODY MASS INDEX: 28.87 KG/M2 | WEIGHT: 169.1 LBS | HEIGHT: 64 IN | HEART RATE: 78 BPM

## 2024-05-22 DIAGNOSIS — Z17.0 MALIGNANT NEOPLASM OF OVERLAPPING SITES OF LEFT BREAST IN FEMALE, ESTROGEN RECEPTOR POSITIVE: Primary | ICD-10-CM

## 2024-05-22 DIAGNOSIS — R79.89 ABNORMAL CBC: Primary | ICD-10-CM

## 2024-05-22 DIAGNOSIS — C50.812 MALIGNANT NEOPLASM OF OVERLAPPING SITES OF LEFT BREAST IN FEMALE, ESTROGEN RECEPTOR POSITIVE: Primary | ICD-10-CM

## 2024-05-22 LAB
BASOPHILS # BLD AUTO: 0.05 10*3/MM3 (ref 0–0.2)
BASOPHILS NFR BLD AUTO: 0.5 % (ref 0–1.5)
DEPRECATED RDW RBC AUTO: 39.4 FL (ref 37–54)
EOSINOPHIL # BLD AUTO: 0.21 10*3/MM3 (ref 0–0.4)
EOSINOPHIL NFR BLD AUTO: 2.1 % (ref 0.3–6.2)
ERYTHROCYTE [DISTWIDTH] IN BLOOD BY AUTOMATED COUNT: 12.8 % (ref 12.3–15.4)
HCT VFR BLD AUTO: 39.8 % (ref 34–46.6)
HGB BLD-MCNC: 12.8 G/DL (ref 12–15.9)
IMM GRANULOCYTES # BLD AUTO: 0.03 10*3/MM3 (ref 0–0.05)
IMM GRANULOCYTES NFR BLD AUTO: 0.3 % (ref 0–0.5)
LYMPHOCYTES # BLD AUTO: 4.52 10*3/MM3 (ref 0.7–3.1)
LYMPHOCYTES NFR BLD AUTO: 45.5 % (ref 19.6–45.3)
MCH RBC QN AUTO: 27.5 PG (ref 26.6–33)
MCHC RBC AUTO-ENTMCNC: 32.2 G/DL (ref 31.5–35.7)
MCV RBC AUTO: 85.4 FL (ref 79–97)
MONOCYTES # BLD AUTO: 0.61 10*3/MM3 (ref 0.1–0.9)
MONOCYTES NFR BLD AUTO: 6.1 % (ref 5–12)
NEUTROPHILS NFR BLD AUTO: 4.52 10*3/MM3 (ref 1.7–7)
NEUTROPHILS NFR BLD AUTO: 45.5 % (ref 42.7–76)
NRBC BLD AUTO-RTO: 0 /100 WBC (ref 0–0.2)
PLATELET # BLD AUTO: 266 10*3/MM3 (ref 140–450)
PMV BLD AUTO: 9.1 FL (ref 6–12)
RBC # BLD AUTO: 4.66 10*6/MM3 (ref 3.77–5.28)
WBC NRBC COR # BLD AUTO: 9.94 10*3/MM3 (ref 3.4–10.8)

## 2024-05-22 PROCEDURE — 85025 COMPLETE CBC W/AUTO DIFF WBC: CPT

## 2024-05-22 PROCEDURE — 36415 COLL VENOUS BLD VENIPUNCTURE: CPT

## 2024-05-22 RX ORDER — ANASTROZOLE 1 MG/1
1 TABLET ORAL DAILY
Qty: 90 TABLET | Refills: 3 | Status: SHIPPED | OUTPATIENT
Start: 2024-05-22 | End: 2025-05-17

## 2024-05-22 NOTE — PROGRESS NOTES
Subjective     REASON FOR CONSULTATION: mpT 2 N0 left breast cancer-30 mm invasive lobular grade 2 ER/OH positive HER2 negative and invasive ductal carcinoma with 15 mm ER/OH positive HER2 negative post mastectomy and sentinel node biopsy  Provide an opinion on any further workup or treatment                             REQUESTING PHYSICIAN: MD Temo Acharya MD    RECORDS OBTAINED:  Records of the patients history including those obtained from the referring provider were reviewed and summarized in detail.    HISTORY OF PRESENT ILLNESS:  The patient is a 63 y.o. year old female who is here for an opinion about the above issue.    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 Amezcua and underwent bowel breast MRI with the findings of the 2 separate tumors in the left breast with no axillary adenopathy or internal mammary adenopathy and a benign right breast    Patient was taken to surgery had a mastectomy and sentinel node biopsy the findings of 2 separate tumors at 30 mm lobular cancer 6 o'clock position and a 15 mm ductal carcinoma 6:00.  There is perineural invasion around the ductal carcinoma but all margins were clear anterior margin was 1.5 mm and 1 sentinel node was negative for metastatic disease    She has done well post mastectomy with an expander in place    Oncotype DX score lot done on the larger tumor was 15-second Oncotype has been ordered today and pending    She is  3 para 3 first childbirth was at age 27 she did not breast-feed  Menarche was at age 12  menopause at 53 but she was on birth control for 20 years and when she stopped her periods did not resume    Family history is positive for mother with breast cancer age 78 there is no other malignancy in the family significant coronary artery disease and heart disease    She is not currently a smoker but smoked for 20 years and quit at age 37 she has not had drinker  She has never had a DVT MI or stroke  She has not had a bone density in 10 years she is overdue for her colonoscopy  She has diabetes but no hypertension    Patient is tearful and crying and most of this is because her brother also  recently in the middle of her cancer diagnosis and she is not dealing with this well.  Her Effexor dose has been doubled from 75 to 154 days ago by our supportive oncology caregivers and hopefully this will help      Past Medical History:   Diagnosis Date    Anxiety     Depression     Diabetes mellitus     Emotional disorder 2024    recent loss of brother and diagnosis of left breast cancer    Facial basal cell cancer     using cream    GERD (gastroesophageal reflux disease)     H/O Arrhythmia     Heart palpitations     Hemorrhoid     History of mononucleosis     Hyperlipidemia     Hypertension     Malignant neoplasm of left breast     Restless leg     Sleep apnea     cpap        Past Surgical History:   Procedure Laterality Date    BREAST BIOPSY Left      AND     BREAST CYST ASPIRATION Left     BREAST RECONSTRUCTION Left 2024    Procedure: Left BREAST RECONSTRUCTION-GOLDILOCKS, TISSUE EXPANDER PLACEMENT WITH ACELLULAR DERMAL MATRIX (ADM);  Surgeon: Jaswinder Barbour MD;  Location: [facility];  Service: Plastics;  Laterality: Left;     SECTION      x2    COLONOSCOPY      Negative, Dr. Avelar    DILATATION AND CURETTAGE      ENDOSCOPY      MASTECTOMY W/ SENTINEL NODE BIOPSY Left 2024    Procedure: LEFT BREAST MASTECTOMY WITH SENTINEL NODE BIOPSY;  Surgeon: Riddhi Amezcua,  MD;  Location: [facility] MAIN OR;  Service: General;  Laterality: Left;    US GUIDED CYST ASPIRATION BREAST N/A 03/22/2024        Current Outpatient Medications on File Prior to Visit   Medication Sig Dispense Refill    Accu-Chek Radha Plus test strip 1 each by Other route Daily.      acetaminophen (TYLENOL) 500 MG tablet Take 2 tablets by mouth 1 (One) Time.      clobetasol (TEMOVATE) 0.05 % ointment Apply 1 application topically to the appropriate area as directed 2 (Two) Times a Week. 60 g 2    fluorouracil (EFUDEX) 5 % cream Apply 1 Application topically to the appropriate area as directed 2 (Two) Times a Day.      gabapentin (NEURONTIN) 600 MG tablet Take 1 tablet by mouth At Night As Needed (take 1/2 to 1 tablet by mouth at bedtime as needed for pain control, anxiety, and sleep). 30 tablet 2    hydrOXYzine (ATARAX) 25 MG tablet Take 1 tablet by mouth 3 (Three) Times a Day As Needed for Anxiety. 30 tablet 0    Jardiance 25 MG tablet tablet Take 1 tablet by mouth After Lunch.      LORazepam (Ativan) 0.5 MG tablet Take 1/2 to 1 tablet by mouth up to twice daily as needed for anxiety 60 tablet 0    losartan (COZAAR) 25 MG tablet Take 1 tablet by mouth After Lunch.  1    metFORMIN ER (GLUCOPHAGE-XR) 500 MG 24 hr tablet Take 2 tablets by mouth 2 (Two) Times a Day.  3    metoprolol succinate XL (TOPROL-XL) 25 MG 24 hr tablet Take 1 tablet by mouth After Lunch.  1    naproxen sodium (ALEVE) 220 MG tablet Take 3 tablets by mouth 2 (Two) Times a Day As Needed for Mild Pain, Fever or Headache.      pramipexole (MIRAPEX) 0.5 MG tablet Take 1 tablet by mouth Every Night.      rosuvastatin (CRESTOR) 40 MG tablet Take 1 tablet by mouth After Lunch.      Tirzepatide (Mounjaro) 12.5 MG/0.5ML solution pen-injector pen Inject 0.5 mL under the skin into the appropriate area as directed 1 (One) Time Per Week. Pt understands not to take till after surgery      traMADol (ULTRAM) 50 MG tablet TAKE 1 TABLET BY MOUTH EVERY 6 HOURS AS  NEEDED FOR PAIN OR BREAKTHROUGH PAIN      venlafaxine XR (Effexor XR) 150 MG 24 hr capsule Take 1 capsule by mouth Daily for 90 days. 30 capsule 2    vitamin D3 (vitamin d) 125 MCG (5000 UT) capsule capsule Take 1 capsule by mouth Daily.      [DISCONTINUED] cefadroxil (DURICEF) 500 MG capsule Take 1 capsule by mouth Every 12 (Twelve) Hours.      [DISCONTINUED] celecoxib (CeleBREX) 200 MG capsule Take 1 capsule by mouth 1 (One) Time.      [DISCONTINUED] docusate sodium (COLACE) 100 MG capsule Take 1 capsule by mouth Every 12 (Twelve) Hours.      [DISCONTINUED] lidocaine-prilocaine (EMLA) 2.5-2.5 % cream Apply 1 Application topically to the appropriate area as directed. As directed      [DISCONTINUED] mupirocin (BACTROBAN) 2 % ointment Apply 1 Application topically to the appropriate area as directed. As directed by surgery      [DISCONTINUED] nystatin (MYCOSTATIN) 881888 UNIT/GM cream        No current facility-administered medications on file prior to visit.        ALLERGIES:    Allergies   Allergen Reactions    Bactrim [Sulfamethoxazole-Trimethoprim] Rash    Sulfa Antibiotics Itching and Rash    Lisinopril Cough    Simvastatin Myalgia     zocor        Social History     Socioeconomic History    Marital status:      Spouse name: Edwin   Tobacco Use    Smoking status: Former     Types: Cigarettes     Passive exposure: Never    Smokeless tobacco: Never    Tobacco comments:     quit age 35   smoked about a pack a week  for  20 years quit 1996   Vaping Use    Vaping status: Never Used   Substance and Sexual Activity    Alcohol use: Yes     Comment: yearly 6    Drug use: Not Currently     Types: Marijuana, Cocaine(coke)     Comment: age 20    Sexual activity: Yes     Birth control/protection: Post-menopausal        Family History   Problem Relation Age of Onset    Diabetes Mother     Heart disease Mother     Breast cancer Mother     Coronary artery disease Father     Heart disease Father     Heart attack Father   "   Diabetes Sister     Heart disease Sister     Coronary artery disease Sister     Heart disease Brother     Diabetes Brother     Coronary artery disease Brother     Coronary artery disease Brother     Heart disease Brother     Diabetes Brother     Ovarian cancer Neg Hx     Uterine cancer Neg Hx     Colon cancer Neg Hx     Malig Hyperthermia Neg Hx         Review of Systems   Psychiatric/Behavioral:  Positive for dysphoric mood. The patient is nervous/anxious.         Tearful because her brother also  recently of heart disease at age 62         Objective     Vitals:    24 1544   BP: 120/73   Pulse: 78   Resp: 18   Temp: 98.5 °F (36.9 °C)   TempSrc: Oral   SpO2: 98%   Weight: 76.7 kg (169 lb 1.6 oz)   Height: 162.6 cm (64.02\")   PainSc: 0-No pain         2024     3:39 PM   Current Status   ECOG score 0       Physical Exam   CONSTITUTIONAL:  Vital signs reviewed.  No distress, looks comfortable.  Tearful  EYES:  Conjunctivae and lids unremarkable.  PERRLA  EARS,NOSE,MOUTH,THROAT:  Ears and nose appear unremarkable.  Lips, teeth, gums appear unremarkable.  RESPIRATORY:  Normal respiratory effort.  Lungs clear to auscultation bilaterally.  BREASTS: Right breast benign left chest wall shows a expander in place  CARDIOVASCULAR:  Normal S1, S2.  No murmurs rubs or gallops.  No significant lower extremity edema.  GASTROINTESTINAL: Abdomen appears unremarkable.  Nontender.  No hepatomegaly.  No splenomegaly.  LYMPHATIC:  No cervical, supraclavicular, axillary lymphadenopathy.  SKIN:  Warm.  No rashes.  PSYCHIATRIC:  Normal judgment and insight.  Normal mood and affect.      RECENT LABS:  Hematology WBC   Date Value Ref Range Status   2024 9.94 3.40 - 10.80 10*3/mm3 Final   06/15/2022 9.07 4.5 - 11.0 10*3/uL Final     RBC   Date Value Ref Range Status   2024 4.66 3.77 - 5.28 10*6/mm3 Final   06/15/2022 5.00 4.0 - 5.2 10*6/uL Final     Hemoglobin   Date Value Ref Range Status   2024 12.8 12.0 - " 15.9 g/dL Final   06/15/2022 13.7 12.0 - 16.0 g/dL Final     Hematocrit   Date Value Ref Range Status   05/22/2024 39.8 34.0 - 46.6 % Final   06/15/2022 43.8 36.0 - 46.0 % Final     Platelets   Date Value Ref Range Status   05/22/2024 266 140 - 450 10*3/mm3 Final   06/15/2022 311 140 - 440 10*3/uL Final          3/22/24  Final Diagnosis   1. Left Breast, 6:00, Core Needle Biopsy for a Mass with Calcifications:               A. INVASIVE DUCTAL CARCINOMA, Moderately Differentiated; Hubert Histologic Grade II/III       (tubule score = 3, nuclear score = 3, mitoses score = 1), measuring at least 7 mm.               B. Negative for in situ carcinoma in this sample.               C. Negative for lymphovascular space invasion.               D. Calcifications associated with invasive tumor.  E. See biomarker template #1 and comment.     2. Left Breast, 12:00, Core Needle Biopsy for a Mass with Calcifications:               A. INVASIVE LOBULAR CARCINOMA, Moderately Differentiated; Hubert Histologic Grade II/III       (tubule score = 3, nuclear score = 2, mitoses score = 1), measuring at least 5 mm.               B. Low-grade ductal carcinoma in situ (DCIS), solid and cribriform types with associated calcifications.                   C. Atypical lobular hyperplasia.               D. Negative for lymphovascular space invasion.               E. Calcifications also associated with invasive tumor.               F. See biomarker template #2 and comment.   Protocol posted: 12/13/2023BREAST BIOMARKER REPORTING TEMPLATE - 1  Test(s) Performed     Estrogen Receptor (ER) Status  Positive (greater than 10% of cells demonstrate nuclear positivity)   Percentage of Cells with Nuclear Positivity  %   Average Intensity of Staining  Strong   Test Type  Food and Drug Administration (FDA) cleared (test / vendor): [employer]   Primary Antibody  SP1   Scoring System  Yudith   Proportion Score  5   Intensity Score  3   Total Yudith  Score  8   Test(s) Performed     Progesterone Receptor (PgR) Status  Positive   Percentage of Cells with Nuclear Positivity  81-90%   Average Intensity of Staining  Moderate   Test Type  Food and Drug Administration (FDA) cleared (test / vendor): Lago   Primary Antibody  1E2   Scoring System  Yudith   Proportion Score  5   Intensity Score  2   Total Yudith Score  7   Test(s) Performed     HER2 by Immunohistochemistry  Negative (Score 1+)   Test Type  Food and Drug Administration (FDA) cleared (test / vendor): Lago   Primary Antibody  4B5   Test(s) Performed  Ki-67   Ki-67 Percentage of Positive Nuclei  18 %     BREAST BIOMARKER REPORTING TEMPLATE - 2  Test(s) Performed     Estrogen Receptor (ER) Status  Positive (greater than 10% of cells demonstrate nuclear positivity)   Percentage of Cells with Nuclear Positivity  %   Average Intensity of Staining  Strong   Test Type  Food and Drug Administration (FDA) cleared (test / vendor): Lago   Primary Antibody  SP1   Scoring System  Yudith   Proportion Score  5   Intensity Score  3   Total Yudith Score  8   Test(s) Performed     Progesterone Receptor (PgR) Status  Positive   Percentage of Cells with Nuclear Positivity  %   Average Intensity of Staining  Strong   Test Type  Food and Drug Administration (FDA) cleared (test / vendor): Lago   Primary Antibody  1E2   Scoring System  Yudith   Proportion Score  5   Intensity Score  3   Total Yudith Score  8   Test(s) Performed     HER2 by Immunohistochemistry  Negative (Score 1+)   Test Type  Food and Drug Administration (FDA) cleared (test / vendor): Lago   Primary Antibody  4B5   Test(s) Performed  Ki-67   Ki-67 Percentage of Positive Nuclei  8 %     4/30/24  Synoptic Checklist  INVASIVE CARCINOMA OF THE BREAST: Resection (INVASIVE CARCINOMA OF THE BREAST: RESECTION - All Specimens) 8th Edition  - Protocol posted: 12/13/2023  SPECIMEN  Procedure Total mastectomy  Specimen Laterality  Left  .  TUMOR  Tumor Site Clock position  6 o'clock  12 o'clock  Histologic Type Invasive lobular carcinoma  Histologic Grade (Hubert Histologic Score)  Glandular (Acinar) / Tubular Differentiation Score 3  Nuclear Pleomorphism Score 2  Mitotic Rate Score 1  Overall Grade Grade 2 (scores of 6 or 7)  Tumor Size Greatest dimension of largest invasive focus (Millimeters): 30 mm  Tumor Focality Multiple foci of invasive carcinoma  Number of Foci 2  Sizes of Individual Foci in Millimeters (mm) 30, 15  Ductal Carcinoma In Situ (DCIS) Not identified  Lobular Carcinoma In Situ (LCIS) Present  Lymphatic and / or Vascular Invasion Not identified  Dermal Lymphatic and / or Vascular Invasion Not identified  Microcalcifications Not identified  Treatment Effect in the Breast No known presurgical therapy  .  MARGINS  Margin Status for Invasive Carcinoma All margins negative for invasive carcinoma  Distance from Invasive Carcinoma to Closest Margin 1.5 mm  Closest Margin(s) to Invasive Carcinoma Anterior  Distance from Invasive Carcinoma to Anterior Margin 1.5 mm  Distance from Invasive Carcinoma to Posterior Margin 30 mm  Distance from Invasive Carcinoma to Superior Margin 50 mm  Distance from Invasive Carcinoma to Inferior Margin 40 mm  Distance from Invasive Carcinoma to Medial Margin 70 mm  Distance from Invasive Carcinoma to Lateral Margin  pT Category pT2  T Suffix (m)  pN Category pN0  N Suffix (sn)  .      Assessment & Plan   Mp (2)T2N0 grade 2 IDC and ILC left breast post mastectomy and SLNB ER/OH + Her2 neg clear margins  Oncotype on larger tumor 15 which is the lobular cancer  Oncotype on ductal carcinoma pending    2.  Anxiety and depression-situational her regular dose of Effexor was doubled from 75 to 154 days ago    3.  Diabetes mellitus    4.  Hypertension hyperlipidemia on treatment    5.  Sleep apnea    Plan  1.  Await second Oncotype score  2, DEXA scan  3.  Discussed in detail the rationale for adjuvant  hormonal blockade especially if her second Oncotype is also below 25 which I suspect it  will be.  Discussed the prognostic and predictive value of the Oncotype DX score    The side effects and toxicities of the Aromatase inhibitors was discussed with the patient including, hot flashes, mood swings and hair thinning.Significant arthralgias and worsening bone density were also discussed. Baseline bone density evaluation was ordered.    We will call her in 2 weeks with a second Oncotype score she will be presented at tumor board because she is concerned that she may need radiation because of the perineural invasion but also because of a close anterior margin though no ink on tumor    .  Follow-up in 4 months has been scheduled once we know this second Oncotype score    I recommended that she talk to her primary doctor about stress test because of her significant family history of coronary artery disease    I spent 60 total minutes, face-to-face, caring for Colleen today. Greater than 50% of this time involved counseling and/or coordination of care as documented within this note.

## 2024-05-29 ENCOUNTER — OFFICE VISIT (OUTPATIENT)
Dept: PSYCHIATRY | Facility: HOSPITAL | Age: 64
End: 2024-05-29
Payer: COMMERCIAL

## 2024-05-29 ENCOUNTER — DOCUMENTATION (OUTPATIENT)
Dept: OTHER | Facility: HOSPITAL | Age: 64
End: 2024-05-29
Payer: COMMERCIAL

## 2024-05-29 DIAGNOSIS — C50.812 MALIGNANT NEOPLASM OF OVERLAPPING SITES OF LEFT BREAST IN FEMALE, ESTROGEN RECEPTOR POSITIVE: ICD-10-CM

## 2024-05-29 DIAGNOSIS — F51.01 PRIMARY INSOMNIA: ICD-10-CM

## 2024-05-29 DIAGNOSIS — F33.1 MAJOR DEPRESSIVE DISORDER, RECURRENT EPISODE, MODERATE: Primary | ICD-10-CM

## 2024-05-29 DIAGNOSIS — F41.1 GENERALIZED ANXIETY DISORDER: ICD-10-CM

## 2024-05-29 DIAGNOSIS — Z17.0 MALIGNANT NEOPLASM OF OVERLAPPING SITES OF LEFT BREAST IN FEMALE, ESTROGEN RECEPTOR POSITIVE: ICD-10-CM

## 2024-05-29 DIAGNOSIS — F41.0 PANIC DISORDER: ICD-10-CM

## 2024-05-29 PROCEDURE — 99214 OFFICE O/P EST MOD 30 MIN: CPT | Performed by: NURSE PRACTITIONER

## 2024-05-29 NOTE — PROGRESS NOTES
Oncology Social Work  Supportive Oncology Services    Chief Complaint: Grief, Sadness     Subjective  Patient ID: Colleen Yanes is a 63 y.o. female who presents to the Supportive Oncology Services (SOS) clinic for initial consultation at the request of YONATHAN Yost in [facility].  Chart reviewed.  Patient dx with grade 2 IDC and ILC left breast post mastectomy and SLNB ER/IA + Her2 neg clear margins.  No chemotherapy or radiation therapy planned. Will go on an AI.  Patient reports grief over patient's brother's death and unresolved grief that was not processed from when her mother .  Increased tearfulness.     Social History  Marital Status:  for 37 years   Children: Yes - 3 adult children   Support Community: Spouse, Children, and family   Highest Level of Education: college graduate  Career: RN - still working  Tobacco Use: Former smoker   Alcohol Use:  yes, occasional   Marijuana/ Other drug Use: denied.    Medical History  Psychiatric History: Outpatient - current with Behavioral Oncology - YONATHAN Yost    Family History  Family Psychiatric History: There has been no family history of psychological problems  Family Cancer History: breast cancer mother;         Objective   Mental Status Exam  Appearance:  clean and casually dressed, appropriate  Attitude toward clinician:  cooperative and agreeable   Speech:    Rate:  regular rate and rhythm   Volume:  normal  Motor:  no abnormal movements present  Mood:  sad  Affect:  dysphoric  Thought Processes:  linear, logical, and goal directed  Thought Content:  normal  Suicidal Thoughts:  absent  Homicidal Thoughts:  absent  Perceptual Disturbance: no perceptual disturbance  Attention and Concentration:  good  Insight and Judgement:  good  Memory:  memory appears to be intact    Physical Exam  Station and gait observed to be WNL -patient is active, independent with mobility and ADL's.      Plan of Care    OSW met with patient today for initial  visit.    Will cont to see for grief therapy, processing repressed memories and feelings.   To assist with moving through her grief vs feeling stuck.     Next Appt:  6/11/2024 at 10AM    JAMES Reed, TIESHAW

## 2024-05-29 NOTE — PATIENT INSTRUCTIONS
[facility] Supportive Oncology  [address]  [place], [place]   [phone]    Please see below and attached for additional resources:  Group, individual (for the patient and family members), and family therapy    [employer]    [email or web address]    Phone number: [phone]) [phone]    Support for patients: matched with someone with a similar diagnosis and in survivorship    Friend[employer] for Life  https://www.mugnpd7alou.org/    Self-referrals for wanting a primary care doctor or a specialist  PCP [phone]  Specialist [phone]  Bereavement Support Groups in the [place] Area  Grief Share: https://www.griefshare.org/countries/us/[facility] [place]/ky/[facility]/[place]  *If you go to the website, you can click on the specific group date and it will give additional info about the groups*  Therapy resources  Chronic Illness counseling center  www.[facility].[employer]E Software  [address] , [address]  [place] [address]  [facility]: phone # [phone]    Two Locations:    [address]  [place] [address]      [address], [address]  [place] [address]  FREE Massages  Patients are allowed some massages for FREE through the supportive oncology department. Massages at the cancer center and are done by Vandana. She does her own scheduling.  Office:  [phone]    Mobile:  [phone]  FREE exercise program  Free exercise program Livestrong program through the [employer]:  https://www.[facility].org/what-we-do/healthy-living/fitness/livestrong  Chiropractic services ([facility])  [employer]  ([phone]  Accupuncture  Dr. Marni Santana does Accupuncture  [facility] PRIMARY [facility]  [address] [place] [address] [phone] phone  [phone] fax    Suicide / Crisis Hotline  Call: 988    OR   Go online and use their text / chat function - 988 Suicide & Crisis Lifeline - Call. Text. Chat.  (988[facility].org)  Unified Protocol Safety Plan lesson  National Suicide Hotline Number (036-027-8273)  National safety text line (Text HOME to 104475)    [facility]: Alcohol / Substance Use and Dual Diagnosis Management  [address] [place] [address], [place], [place], [address]  [phone]  OR (194) 643-4473 https://Webroot/locations-and-payment/  Domestic Violence  2(540) 862-8969 (SAFE)                            www.kcadv.org   Sexual Assault Crisis Centers (for children and adults)  9(573) 951-3308 (HOPE)  Genesight testing  https://SearchMan SEO/gene-test-mental-health-medications/?utm_source=vane&utm_medium=cpc&utm_campaign=751403050&utm_content=5457943851635872&utm_term=genesight%20testing&utm_source=vane&utm_medium=cpc&utm_campaign=branded&utm_content=9803881070966628&utm_term=genesight%20testing&wriuvbk=[id number]

## 2024-05-29 NOTE — PROGRESS NOTES
[facility] Oncology Services  In person follow up session - The primary office location is [facility].     Subjective  Patient ID: Colleen Yanes is a 63 y.o. female is seen face to face in the [facility] ([facility]) Clinic. The patient is currently in survivorship of left breast cancer, status post  left mastectomy and sentinel lymph node biopsy on 04/30/24.  She is followed by Dr. Riddhi Amezcua and Dr. Jaswinder Barbour     CC: follow up medication and symptom management    Last seen in person: 05/16/2024    HPI/ Interval History: This is a follow-up medication management visit and reevaluation of mood.  The patient reports feeling like she is doing better.  She states she is tolerating the venlafaxine titration without any issues or side effects.  She feels like her anxiety and hot flashes are decreasing and has had only 1 episode of hot flashes this last week.  She reports that she has been going to sleep a little later, averaging about 5 to 6 hours per night, but is staying asleep and feels it is restorative.  She feels her interest has improved, but continues to have decreased motivation and has to push herself.  She has been making herself socially engage and go to social events.  She has been going places that she has previously been avoiding, such as the grocery store.  She feels her doctor appointments have been helpful in getting her out of the house.  She admits that if she did not have a reason to leave, she probably would stay home.  She feels her energy has improved.  She has been working on reorganizing some things in her home and purging stuff.  She has been helping watch her grandson, who is 2 years old and sometimes feels she has a hard time keeping up with him, but enjoys that also.  She feels her concentration and attention are adequate.  Her appetite is getting better.  She stopped taking Mounjaro prior to her surgery for the last 2 weeks and has  "been feeling better.  She reports her mood as \"I feel better.\"  She denies any passive or active SI, plan or intent, self-harming behaviors, HI, AVH, faisal, hypomania, delusional thinking, or psychosis.  She continues to feel she has good support with her family and friends.    Records reviewed.    PHQ-9 Total Score: 15 (previously scored 11 at last visit on 5/16/2024)  Reports a score of \"0\" to question 9.    GAD7 Total Score: (Previously 14 at her visit on 5/16/2024)    Objective   Mental Status Exam  Appearance:  clean and casually dressed, appropriate and neat   Attitude toward clinician:  cooperative and agreeable , good eye contact  Speech:    Rate:  regular rate and rhythm   Volume:  normal  Motor:  no abnormal movements present  Mood:  \"I feel a little bit better.\"  Affect:  euthymic and mood congruent, full range  Thought Processes:  linear, logical, and goal directed and associations intact  Thought Content:  normal  Suicidal Thoughts:  absent  Homicidal Thoughts:  absent  Perceptual Disturbance: no perceptual disturbance, no evidence of responding to internal stimuli  Attention and Concentration:  fair  Insight and Judgement:  fair  Memory:  memory appears to be intact    Gait: Within normal limits.    Assistive devices: None.      Exam: As above  Current Documented Allergies & Reactions  Allergies   Allergen Reactions    Bactrim [Sulfamethoxazole-Trimethoprim] Rash    Sulfa Antibiotics Itching and Rash    Lisinopril Cough    Simvastatin Myalgia     zocor       Current Psychotropic Medications:    gabapentin (NEURONTIN) 600 MG tablet, Take 1 tablet by mouth At Night As Needed (take 1/2 to 1 tablet by mouth at bedtime as needed for pain control, anxiety, and sleep)    LORazepam (Ativan) 0.5 MG tablet, Take 1/2 to 1 tablet by mouth up to twice daily as needed for anxiety    venlafaxine XR (Effexor XR) 150 MG 24 hr capsule, Take 1 capsule by mouth Daily for 90 days    Diagnoses and all orders for this " visit:    1. Major depressive disorder, recurrent episode, moderate (Primary)  -     venlafaxine XR (Effexor XR) 150 MG 24 hr capsule; Take 1 capsule by mouth Daily for 90 days.  Dispense: 30 capsule; Refill: 2    2. Generalized anxiety disorder  -     gabapentin (NEURONTIN) 600 MG tablet; Take 1 tablet by mouth At Night As Needed (take 1/2 to 1 tablet by mouth at bedtime as needed for pain control, anxiety, and sleep).  Dispense: 30 tablet; Refill: 2  -     LORazepam (Ativan) 0.5 MG tablet; Take 1/2 to 1 tablet by mouth up to twice daily as needed for anxiety  Dispense: 60 tablet; Refill: 0  -     venlafaxine XR (Effexor XR) 150 MG 24 hr capsule; Take 1 capsule by mouth Daily for 90 days.  Dispense: 30 capsule; Refill: 2    3. Panic disorder    4. Primary insomnia  -     gabapentin (NEURONTIN) 600 MG tablet; Take 1 tablet by mouth At Night As Needed (take 1/2 to 1 tablet by mouth at bedtime as needed for pain control, anxiety, and sleep).  Dispense: 30 tablet; Refill: 2    5. Malignant neoplasm of overlapping sites of left breast in female, estrogen receptor positive      Plan of Care/ Medical Decision Making  1) Continue current medication regimen and treatment plan.  2) Provided supportive talk therapy through active listening, empathy, reflection, normalization of feelings, and positive reframing. Discussed current methods of coping. Encouraged ongoing talk therapy with this provider or a trusted source of support. She will be seeing IRMA Jimenez LCSW today for some bereavement counseling. Will continue to provide additional resources in the after visit summary, patient instructions.  4) Notify provider if having any questions, concerns, or issues.    Follow up with YONATHAN Yost, 4-6 weeks and as needed    I spent 32 minutes caring for Colleen on this date of service. This time includes time spent by me in the following activities: preparing for the visit, obtaining and/or reviewing a separately obtained  history, performing a medically appropriate examination and/or evaluation, counseling and educating the patient/family/caregiver, ordering medications, tests, or procedures, documenting information in the medical record, and care coordination.

## 2024-05-30 ENCOUNTER — TELEPHONE (OUTPATIENT)
Dept: ONCOLOGY | Facility: CLINIC | Age: 64
End: 2024-05-30
Payer: COMMERCIAL

## 2024-06-06 ENCOUNTER — HOSPITAL ENCOUNTER (OUTPATIENT)
Facility: HOSPITAL | Age: 64
Discharge: HOME | End: 2024-06-06
Payer: COMMERCIAL

## 2024-06-06 ENCOUNTER — PATIENT OUTREACH (OUTPATIENT)
Dept: OTHER | Facility: HOSPITAL | Age: 64
End: 2024-06-06
Payer: COMMERCIAL

## 2024-06-06 DIAGNOSIS — C50.812 MALIGNANT NEOPLASM OF OVERLAPPING SITES OF LEFT BREAST IN FEMALE, ESTROGEN RECEPTOR POSITIVE: ICD-10-CM

## 2024-06-06 DIAGNOSIS — Z17.0 MALIGNANT NEOPLASM OF OVERLAPPING SITES OF LEFT BREAST IN FEMALE, ESTROGEN RECEPTOR POSITIVE: ICD-10-CM

## 2024-06-06 PROCEDURE — 77080 DXA BONE DENSITY AXIAL: CPT

## 2024-06-06 NOTE — PROGRESS NOTES
Called MsMandy Laurita to see how she was doing. Left a message with my contact information and asked her to call back at her convenience.

## 2024-06-07 RX ORDER — GABAPENTIN 600 MG/1
600 TABLET ORAL NIGHTLY PRN
Qty: 30 TABLET | Refills: 2 | Status: SHIPPED | OUTPATIENT
Start: 2024-06-07

## 2024-06-07 RX ORDER — VENLAFAXINE HYDROCHLORIDE 150 MG/1
150 CAPSULE, EXTENDED RELEASE ORAL DAILY
Qty: 30 CAPSULE | Refills: 2 | Status: SHIPPED | OUTPATIENT
Start: 2024-06-07 | End: 2024-09-05

## 2024-06-07 RX ORDER — LORAZEPAM 0.5 MG/1
TABLET ORAL
Qty: 60 TABLET | Refills: 0 | Status: SHIPPED | OUTPATIENT
Start: 2024-06-07

## 2024-06-11 ENCOUNTER — DOCUMENTATION (OUTPATIENT)
Dept: OTHER | Facility: HOSPITAL | Age: 64
End: 2024-06-11
Payer: COMMERCIAL

## 2024-06-11 LAB
LAB AP CASE REPORT: NORMAL
LAB AP CASE REPORT: NORMAL
LAB AP DIAGNOSIS COMMENT: NORMAL
LAB AP DIAGNOSIS COMMENT: NORMAL
LAB AP SPECIAL STAINS: NORMAL
LAB AP SPECIAL STAINS: NORMAL
LAB AP SYNOPTIC CHECKLIST: NORMAL
LAB AP SYNOPTIC CHECKLIST: NORMAL
Lab: NORMAL
PATH REPORT.ADDENDUM SPEC: NORMAL
PATH REPORT.ADDENDUM SPEC: NORMAL
PATH REPORT.FINAL DX SPEC: NORMAL
PATH REPORT.FINAL DX SPEC: NORMAL
PATH REPORT.GROSS SPEC: NORMAL
PATH REPORT.GROSS SPEC: NORMAL

## 2024-06-11 NOTE — PROGRESS NOTES
Oncology Social Work  Support[facility]cology Services     Chief Complaint: Grief, Sadness      Subjective  Patient ID: Colleen Yanes is a 63 y.o. female who presents to the [facility] Oncology Services ([facility]) clinic for initial consultation at the request of YNOATHAN Yost in [facility].  Chart reviewed.  Patient dx with grade 2 IDC and ILC left breast post mastectomy and SLNB ER/FL + Her2 neg clear margins.  No chemotherapy or radiation therapy planned. Will go on an AI.  Patient reports grief over patient's brother's death and unresolved grief that was not processed from when her mother .  Met with patient on 2024 for another visit. Patient with improved mood/ spirits.  She feels that she is slowly processing and moving through grief - discussed the importance of being intentional and active with moving through grief.  Patient reports several outings with family that has been helpful. Active and reflective listening.  Discussed planning and control.     Objective   Mental Status Exam  Appearance:  clean and casually dressed, appropriate  Attitude toward clinician:  cooperative and agreeable   Speech:               Rate:  regular rate and rhythm              Volume:  normal  Motor:  no abnormal movements present  Mood:  stable  Affect:  euthymic - mood congruent   Thought Processes:  linear, logical, and goal directed  Thought Content:  normal  Suicidal Thoughts:  absent  Homicidal Thoughts:  absent  Perceptual Disturbance: no perceptual disturbance  Attention and Concentration:  good  Insight and Judgement:  good  Memory:  memory appears to be intact     Physical Exam  Station and gait observed to be WNL -patient is active, independent with mobility and ADL's.        Plan of Care   Suggested writing a letter to her mom.  Patient states that she will reach out to [facility] if future appt needed.  She feels that at this time she is doing much better.      Riddhi Jimenez, MSSW, LCSW

## 2024-06-25 ENCOUNTER — TELEPHONE (OUTPATIENT)
Dept: PSYCHIATRY | Facility: HOSPITAL | Age: 64
End: 2024-06-25
Payer: COMMERCIAL

## 2024-06-25 NOTE — TELEPHONE ENCOUNTER
Patient scheduled 7-3-24 for an appt with Georgie MCMANUS with [facility].  LVM for patient to call back to reschedule appt with Carlotta MCMANUS due to provider being out on leave of absence.  [phone].

## 2024-07-02 ENCOUNTER — HOSPITAL ENCOUNTER (OUTPATIENT)
Dept: OCCUPATIONAL THERAPY | Facility: HOSPITAL | Age: 64
Discharge: HOME | End: 2024-07-02
Admitting: STUDENT IN AN ORGANIZED HEALTH CARE EDUCATION/TRAINING PROGRAM
Payer: COMMERCIAL

## 2024-07-02 DIAGNOSIS — Z17.0 MALIGNANT NEOPLASM OF OVERLAPPING SITES OF LEFT BREAST IN FEMALE, ESTROGEN RECEPTOR POSITIVE: ICD-10-CM

## 2024-07-02 DIAGNOSIS — C50.812 MALIGNANT NEOPLASM OF OVERLAPPING SITES OF LEFT BREAST IN FEMALE, ESTROGEN RECEPTOR POSITIVE: ICD-10-CM

## 2024-07-02 DIAGNOSIS — Z91.89 AT RISK FOR LYMPHEDEMA: Primary | ICD-10-CM

## 2024-07-02 DIAGNOSIS — M25.612 DECREASED RANGE OF MOTION OF LEFT SHOULDER: ICD-10-CM

## 2024-07-02 PROCEDURE — 97535 SELF CARE MNGMENT TRAINING: CPT

## 2024-07-02 PROCEDURE — 97168 OT RE-EVAL EST PLAN CARE: CPT

## 2024-07-02 PROCEDURE — 93702 BIS XTRACELL FLUID ANALYSIS: CPT

## 2024-07-02 NOTE — THERAPY EVALUATION
Outpatient Occupational Therapy Lymphedema Re-Evaluation  [facility]     Patient Name: Colleen Yanes  : 1960  MRN: [id number]  Today's Date: 2024      Visit Date: 2024    Patient Active Problem List   Diagnosis    Well woman exam with routine gynecological exam    Malignant neoplasm of overlapping sites of left breast in female, estrogen receptor positive    Major depressive disorder, recurrent episode, moderate    Generalized anxiety disorder    Bereavement    Panic disorder    Pain    Primary insomnia        Past Medical History:   Diagnosis Date    Anxiety     Depression     Diabetes mellitus     Emotional disorder 2024    recent loss of brother and diagnosis of left breast cancer    Facial basal cell cancer     using cream    GERD (gastroesophageal reflux disease)     H/O Arrhythmia     Heart palpitations     Hemorrhoid     History of mononucleosis     Hyperlipidemia     Hypertension     Malignant neoplasm of left breast     Restless leg     Sleep apnea     cpap        Past Surgical History:   Procedure Laterality Date    BREAST BIOPSY Left      AND     BREAST CYST ASPIRATION Left     BREAST RECONSTRUCTION Left 2024    Procedure: Left BREAST RECONSTRUCTION-GOLDILOCKS, TISSUE EXPANDER PLACEMENT WITH ACELLULAR DERMAL MATRIX (ADM);  Surgeon: Jaswinder Barbour MD;  Location: [facility];  Service: Plastics;  Laterality: Left;     SECTION      x2    COLONOSCOPY      Negative, Dr. Avelar    DILATATION AND CURETTAGE      ENDOSCOPY      MASTECTOMY W/ SENTINEL NODE BIOPSY Left 2024    Procedure: LEFT BREAST MASTECTOMY WITH SENTINEL NODE BIOPSY;  Surgeon: Riddhi Amezcua MD;  Location: [facility];  Service: General;  Laterality: Left;    US GUIDED CYST ASPIRATION BREAST N/A 2024         Visit Dx:     ICD-10-CM ICD-9-CM   1. At risk for lymphedema  Z91.89 V49.89   2. Malignant neoplasm of overlapping sites of left breast in female, estrogen  receptor positive  C50.812 174.8    Z17.0 V86.0   3. Decreased range of motion of left shoulder  M25.612 719.51            Lymphedema       Row Name 07/02/24 0900             Subjective Pain    Able to rate subjective pain? yes  -RE      Pre-Treatment Pain Level 2  -RE      Post-Treatment Pain Level 2  -RE      Subjective Pain Comment with reaching  -RE         Lymphedema Assessment    Lymphedema Classification at risk/stage 0;LUE:  -RE      Lymphedema Cancer Related Sx simple mastectomy;sentinel node biopsy;reconstructive  -RE      Lymphedema Surgery Comments Left tissue expander; current plan is for a right reduction for symmetry at the time of patient's exchange surgery  -RE      Lymph Nodes Removed # 1  -RE      Positive Lymph Nodes # 0  -RE      Chemo Received no  -RE      Radiation Therapy Received no  -RE      Infections or Cellulitis? no  -RE         Posture/Observations    Alignment Options Forward head;Rounded shoulders;Scapular elevation  -RE      Forward Head Mild;Increased;Sitting posture  -RE      Rounded Shoulders Left:;Moderate;Right:;Mild;Increased;Sitting posture  -RE      Scapular Elevation Bilateral:;Mild  -RE         General ROM    LT Upper Ext Lt Shoulder ABduction;Lt Shoulder Flexion;Lt Shoulder Internal Rotation  -RE         Left Upper Ext    Lt Shoulder Abduction AROM 90  -RE      Lt Shoulder Flexion AROM 130  -RE      Lt Shoulder Internal Rotation AROM WFL  -RE         L-Dex Bioimpedence Screening    L-Dex Measurement Extremity LUE  -RE      L-Dex Patient Position Standing  -RE      L-Dex UE Dominate Side Right  -RE      L-Dex UE At Risk Side Left  -RE      L-Dex UE Pre Surgical Value Yes  -RE      L-Dex UE Score -2.3  -RE      L-Dex UE Baseline Score -2.9  -RE      L-Dex UE Value Change 0.6  -RE      L-Dex UE Comment WNL  -RE                User Key  (r) = Recorded By, (t) = Taken By, (c) = Cosigned By      Initials Name Provider Type    RE Yesi Pedroza OTR Occupational Therapist                   The patient had a postop baseline SOZO measurement which I reviewed today. The score is WNL  see scanned to EMR. Bioimpedance spectroscopy helps identify the   onset of lymphedema in an arm or leg before patients experience noticeable swelling. Research has   shown that 92% of patients with early detection of lymphedema using L-Dex combined with   intervention do not progress to chronic lymphedema through three years. Additionally, as of March 2023, the NCCN Guidelines® for Survivorship recommend proactive screening for lymphedema using   bioimpedance spectroscopy. Whenever possible, patients are tested for baseline L-Dex score before   cancer treatment begins and then are reassessed during regular follow-up visits using the SOZO device.   Otherwise, this can be started postoperatively and continued during regular follow-up visits. If the   patient’s L-Dex score increases above normal levels, that is a sign that lymphedema is developing and a   referral is made to occupational therapy for further evaluation and early compression treatment.   Lymphedema assessment with the SOZO L-Dex score is recommended to be done every 3 months for   the first 3 years and then every 6 months for years 4 and 5 followed by annually afterwards          Therapy Education  Education Details: Provided patient with home exercise program to address left shoulder decreased active range of motion.  Access Code: [email or web address]  URL: [email or web address]  Date: 07/02/2024  Prepared by: Yesi Pedroza    Exercises  - Supine Chest Stretch with Elbows Bent  - 1 x daily - 7 x weekly - 3 sets - 10 reps  - Supine Shoulder Flexion Extension Full Range AROM  - 1 x daily - 7 x weekly - 3 sets - 10 reps  - Shoulder External Rotation and Scapular Retraction  - 1 x daily - 7 x weekly - 3 sets - 10 reps  - Sternocleidomastoid Stretch  - 1 x daily - 7 x weekly - 3 sets - 10 reps  - Cervical Retraction at Wall  - 1 x daily - 7 x weekly -  3 sets - 10 reps  - Shoulder Flexion Wall Slide with Towel  - 1 x daily - 7 x weekly - 3 sets - 10 reps  - Shoulder Scaption Wall Slide with Towel  - 1 x daily - 7 x weekly - 3 sets - 10 reps and trunk rotation with full shoulder flexion in supine and trunk rotation with hands behind head in supine, hands behind back with chest opening stretch.  Reviewed patient's L-Dex value and explained protocol for lead measurement every 3 months.  I did not recommend a compression sleeve at this time as patient's L-Dex value is normal and she only had 1 lymph node removed and does not have to have radiation.  I recommended that patient continue with her home exercise program 1-2 times per day.  If she notes any new or worsening symptoms she should return for reassessment.  Also, if her range of motion is not near her preoperative baseline at the end of 4 weeks she should return for reassessment and treatment.  Given: HEP, Other (comment)  Program: New, Reinforced  How Provided: Verbal, Demonstration, Written  Provided to: Patient  Level of Understanding: Teach back education performed, Verbalized, Demonstrated  93071 - OT Self Care/Mgmt Minutes: 15         OT Goals       Row Name 07/02/24 0900          OT Short Term Goals    STG Date to Achieve 05/16/24  -RE     STG 1 Patient will demonstrate proper awareness of “What is Lymphedema?” and “Healthy Habits” for improved prevention, management, care of symptoms and ease of transition to self-care of condition.  -RE     STG 1 Progress Met  -RE     STG 2 Patient to improve DASH/ QuickDASH score by 10 points in 4 weeks.  -RE     STG 2 Progress New  -RE     STG 3 Patient independent and compliant with initial home exercise program focused on gentle AROM and stretching to improve AROM to pre-surgical level.  -RE     STG 3 Progress New  -RE        Long Term Goals    LTG Date to Achieve 10/02/24  -RE     LTG 1 Patient will participate in bioimpedance scans every 3 months as a method of  early detection of lymphedema to allow for early intervention.  -RE     LTG 1 Progress Met;Ongoing  -RE     LTG 2 Patient's bioimpedance score to remain below 3.6 for decreased risk of stage II lymphedema.  -RE     LTG 2 Progress Met;Ongoing  -RE     LTG 3 Patient to demonstrate increased left shoulder flexion to 160 to improve functional UE use and to restore pre operative AROM per patient perception.  -RE     LTG 3 Progress New  -RE     LTG 4 Patient to demonstrate increased left shoulder abduction to 160 to improve functional UE use and to restore preoperative AROM per patient perception.  -RE     LTG 4 Progress New  -RE        Time Calculation    OT Goal Re-Cert Due Date 10/02/24  -RE               User Key  (r) = Recorded By, (t) = Taken By, (c) = Cosigned By      Initials Name Provider Type    Yesi Nunez OTR Occupational Therapist                     OT Assessment/Plan       Row Name 07/02/24 1013          OT Assessment    Impairments Impaired flexibility;Impaired lymphatic circulation;Pain;Posture;Range of motion  -RE     Assessment Comments Patient is a 63-year-old female who was recently diagnosed with left breast cancer.  She presents to therapy and evolving condition and is status post left mastectomy with a sentinel lymph node biopsy and immediate reconstruction with a prepectoral tissue expander.  She is at increased risk of postmastectomy lymphedema syndrome in the left upper extremity and trunk due to lymph node removal.  She presents with postoperative decreased range of motion, flexibility, function and increased pain.  Currently, patient has no signs of lymphedema or axillary cording.  We did complete a postop baseline bioimpedance assessment with the current L-Dex score of -2.3 which is consistent with patient's preoperative baseline.  She will benefit from skilled formal breast care occupational therapy at this time to address listed dysfunctions and to follow for lymphedema prevention and  surveillance.  -RE     OT Diagnosis Decreased left shoulder active range of motion, at risk for lymphedema left upper extremity  -RE     OT Rehab Potential Good  -RE     Patient/caregiver participated in establishment of treatment plan and goals Yes  -RE     Patient would benefit from skilled therapy intervention Yes  -RE        OT Plan    OT Frequency Other (comment)  -RE     Predicted Duration of Therapy Intervention (OT) Bioimpedance every 3 months  -RE     Planned CPT's? OT RE-EVAL: 30367;OT BIS XTRACELL FLUID ANALYSIS: 43772;OT SELF CARE/MGMT/TRAIN 15 MIN: 81714  -RE     Planned Therapy Interventions (Optional Details) patient/family education;ROM (Range of Motion);stretching;other (see comments)  Bioimpedance  -RE     OT Plan Comments I advised patient that if her range of motion does not consistently improve over the next 4 weeks she should return for treatment.  -RE               User Key  (r) = Recorded By, (t) = Taken By, (c) = Cosigned By      Initials Name Provider Type    Yesi Nunez OTR Occupational Therapist                    Outcome Measure Options: Quick DASH  Quick DASH  Open a tight or new jar.: Mild Difficulty  Do heavy household chores (e.g., wash walls, wash floors): Mild Difficulty  Carry a shopping bag or briefcase: Mild Difficulty  Wash your back: Moderate Difficulty  Use a knife to cut food: No Difficulty  Recreational activities in which you take some force or impact through your arm, should or hand (e.g. golf, hammering, tennis, etc.): Mild Difficulty  During the past week, to what extent has your arm, shoulder, or hand problem interfered with your normal social activites with family, friends, neighbors or groups?: Slightly  During the past week, were you limited in your work or other regular daily activities as a result of your arm, shoulder or hand problem?: Slightly Limited  Arm, Shoulder, or hand pain: Mild  Tingling (pins and needles) in your arm, shoulder, or hand:  None  During the past week, how much difficulty have you had sleeping because of the pain in your arm, shoulder or hand?: No difficulty  Number of Questions Answered: 11  Quick DASH Score: 20.45         Time Calculation:   OT Start Time: 0900  OT Stop Time: 0955  OT Time Calculation (min): 55 min  Total Timed Code Minutes- OT: 15 minute(s)  Timed Charges  34269 - OT Self Care/Mgmt Minutes: 15  Untimed Charges  OT Eval/Re-eval Minutes: 30  47932 - OT Bioimpedence Rx Minutes: 10  Total Minutes  Timed Charges Total Minutes: 15  Untimed Charges Total Minutes: 40   Total Minutes: 55     Therapy Charges for Today       Code Description Service Date Service Provider Modifiers Qty    34103213763 HC OT SELF CARE/MGMT/TRAIN EA 15 MIN 7/2/2024 Yesi Pedroza OTR GO 1    78165894487 HC OT RE-EVAL 2 7/2/2024 Yesi Pedroza OTR GO 1    63956323952 HC OT BIS XTRACELL FLUID ANALYSIS 7/2/2024 Yesi Pedroza OTR GO 1          Dictated utilizing Dragon dictation:  Much of this encounter note is an electronic transcription/translation of spoken language to printed text. The electronic translation of spoken language may permit erroneous, or at times, nonsensical words or phrases to be inadvertently transcribed; Although I have reviewed the note for such errors, some may still exist.            KARAN Jeong  7/2/2024

## 2024-07-09 ENCOUNTER — PATIENT OUTREACH (OUTPATIENT)
Dept: OTHER | Facility: HOSPITAL | Age: 64
End: 2024-07-09
Payer: COMMERCIAL

## 2024-07-09 NOTE — PROGRESS NOTES
Called Ms. Shay to see how she was doing. She stated she is managing, but just had heart cath yesterday with 3 stents placed. She is now on blood thinners and her reconstruction has been delays by 6 months. She also will take hormone blockers but is supposed to wait to take those until her heart situation settles. Will mail survivorship information as well. She was thankful for the call and will reach out with any needs that arise.

## 2024-07-25 ENCOUNTER — OFFICE (OUTPATIENT)
Dept: URBAN - METROPOLITAN AREA CLINIC 76 | Facility: CLINIC | Age: 64
End: 2024-07-25

## 2024-07-25 VITALS
HEIGHT: 64 IN | OXYGEN SATURATION: 96 % | HEART RATE: 76 BPM | SYSTOLIC BLOOD PRESSURE: 112 MMHG | DIASTOLIC BLOOD PRESSURE: 64 MMHG | WEIGHT: 176 LBS

## 2024-07-25 DIAGNOSIS — R13.10 DYSPHAGIA, UNSPECIFIED: ICD-10-CM

## 2024-07-25 DIAGNOSIS — K21.9 GASTRO-ESOPHAGEAL REFLUX DISEASE WITHOUT ESOPHAGITIS: ICD-10-CM

## 2024-07-25 DIAGNOSIS — K76.0 FATTY (CHANGE OF) LIVER, NOT ELSEWHERE CLASSIFIED: ICD-10-CM

## 2024-07-25 PROCEDURE — 99214 OFFICE O/P EST MOD 30 MIN: CPT | Performed by: INTERNAL MEDICINE

## 2024-07-25 NOTE — SERVICEHPINOTES
Mrs. Teresa Vela is a 63-year-old female with a history of colon polyps and GERD who presents for constipation. She is having a bm 2-3 x per week. She occasionally has some brbpr when she strains. She denies melena. She has occasional abdominal cramping that resolves with a bm. She does have occasional acid reflux that is controlled with pepcid as needed. She reports having occasional dysphagia for 20 years she gets dilation for when needed. She denies n/v. Her weight is stable. She is concerned about her liver and would like to have follow up labs. She was recently diagnosed with breast cancer and had a left mastectomy. She also had cardiac stents placed on 7/8/2024 and was started on Effient. She last had a colonoscopy in 2018. With the new cancer diagnosis, we should consider a colonoscopy in the next 1-2 years.
br
br
Family history: Mother with JOHNSON.
br
br
Data review: Labs 7/2024 HGB 13.3, MCV 86.4, MCH 26.9, , PT 11.3, INR 1. 
brFibroscan from 6/2023 showed F2S3
br
Fibrosure from 5/2023 F0/S3/N0 - nl AST/ALT glucose 186 and triglycerides 173

## 2024-07-25 NOTE — SERVICENOTES
Patient seen and examined with WILLIE Kang.  We reviewed history, we both performed physical exam, and I agree with assessment/plan as formulated on visit note, with additional pertinent information added to the note by me.  PRUDENCE

## 2024-08-14 ENCOUNTER — OFFICE VISIT (OUTPATIENT)
Dept: PSYCHIATRY | Facility: HOSPITAL | Age: 64
End: 2024-08-14
Payer: COMMERCIAL

## 2024-08-14 DIAGNOSIS — F51.01 PRIMARY INSOMNIA: ICD-10-CM

## 2024-08-14 DIAGNOSIS — F33.1 MAJOR DEPRESSIVE DISORDER, RECURRENT EPISODE, MODERATE: ICD-10-CM

## 2024-08-14 DIAGNOSIS — F41.1 GENERALIZED ANXIETY DISORDER: ICD-10-CM

## 2024-08-14 PROCEDURE — 99214 OFFICE O/P EST MOD 30 MIN: CPT | Performed by: NURSE PRACTITIONER

## 2024-08-14 RX ORDER — VENLAFAXINE HYDROCHLORIDE 150 MG/1
150 CAPSULE, EXTENDED RELEASE ORAL DAILY
Qty: 90 CAPSULE | Refills: 0 | Status: SHIPPED | OUTPATIENT
Start: 2024-08-14 | End: 2024-11-12

## 2024-08-14 RX ORDER — GABAPENTIN 300 MG/1
300 CAPSULE ORAL TAKE AS DIRECTED
Qty: 60 CAPSULE | Refills: 2 | Status: SHIPPED | OUTPATIENT
Start: 2024-08-14 | End: 2025-08-14

## 2024-08-14 RX ORDER — LORAZEPAM 0.5 MG/1
TABLET ORAL
Qty: 60 TABLET | Refills: 0 | Status: SHIPPED | OUTPATIENT
Start: 2024-08-14

## 2024-08-14 NOTE — PROGRESS NOTES
[facility]  In Person Session    Subjective  Patient ID: Colleen Yanes is a 63 y.o. female is seen face to face in the [facility].    CC: Anxiety    HPI/ Interval History:   Pt is seen in follow up regarding symptoms of depression and anxiety, complicated sleep, in survivorship of breast cancer.  First visit with this provider.  Patient has successfully been able to increase venlafaxine XR to 150 mg daily, appreciating benefit to aforementioned symptoms and denying significant challenges tolerating. Finds previous challenges with foggy headedness are minimal and manageable, assisted by taking medication regularly at the same time in the evening. Sleep is not good. States she has never been a good sleeper, complicated by RLS and sleep apnea. Reports adherence to CPAP, affirming some improvement with regulation of iron since omission of Nexium. Continues to have vivid dreams and nightmares. Pt continues gabapentin 300 mg q hs, unable to tolerate higher dosing due to AM fatigue. Did sleep better on higher dose.  At this time, continues to take Ativan sparingly, as needed.  Reports approximately 2 doses over the past week, typically taking at night.  Patient reports omission of caffeine, recently adjusting diet cokes to caffeine free.    Continues to mourn multiple stressors since March, inclusive of brother's death, personal cancer diagnosis, and recent need for cardiac cath. Identifies persistent grief regarding loss, acknowledging fear of losing another sibling. Was able to meet with LESLEY Reed, with ongoing discussion regarding potential benefits of group therapy, not pursuing additional 1:1 therapy in this clinic.     Regarding cancer history and ongoing surveillance, reports recently having initiated Arimidex after various delays due to other medical complexities. Has now been on this appx 1 month, tolerating well as of yet. Reports occasional, manageable  hot flashes 2-3 times daily and potentially some fatigue, although unsure if this is even related to med.    Continues to communicate with  well, appreciating his ongoing support.     Exam: As above    Recent Labs Reviewed:  No visits with results within 2 Month(s) from this visit.   Latest known visit with results is:   Lab on 05/22/2024   Component Date Value    WBC 05/22/2024 9.94     RBC 05/22/2024 4.66     Hemoglobin 05/22/2024 12.8     Hematocrit 05/22/2024 39.8     MCV 05/22/2024 85.4     MCH 05/22/2024 27.5     MCHC 05/22/2024 32.2     RDW 05/22/2024 12.8     RDW-SD 05/22/2024 39.4     MPV 05/22/2024 9.1     Platelets 05/22/2024 266     Neutrophil % 05/22/2024 45.5     Lymphocyte % 05/22/2024 45.5 (H)     Monocyte % 05/22/2024 6.1     Eosinophil % 05/22/2024 2.1     Basophil % 05/22/2024 0.5     Immature Grans % 05/22/2024 0.3     Neutrophils, Absolute 05/22/2024 4.52     Lymphocytes, Absolute 05/22/2024 4.52 (H)     Monocytes, Absolute 05/22/2024 0.61     Eosinophils, Absolute 05/22/2024 0.21     Basophils, Absolute 05/22/2024 0.05     Immature Grans, Absolute 05/22/2024 0.03     nRBC 05/22/2024 0.0    Labs reviewed    Current Psychotropic Medications:  Gabapentin 300 mg q hs  Ativan 0.5 mg appx once daily as needed for anxiety    Plan of Care/ Medical Decision Making  Trial increase of gabapentin to 300 mg q evening meal, 300 mg q hs. Evaluate AM fatigue. Trend toward use of this vs benzo, while supporting limited use of benzodiazapine as needed.  Continue venlafaxine XR as written. May consider adjustment to AM dosing given potential norepinephrine boost disrupting sleep.  Options regarding ongoing care and clinic explored; patient is interested in available peer support and FU has been scheduled in group setting.  Resources provided to patient regarding community offerings of grief therapy.    Diagnoses and all orders for this visit:    1. Generalized anxiety disorder  -     LORazepam (Ativan) 0.5  MG tablet; Take 1/2 to 1 tablet by mouth up to twice daily as needed for anxiety  Dispense: 60 tablet; Refill: 0  -     venlafaxine XR (Effexor XR) 150 MG 24 hr capsule; Take 1 capsule by mouth Daily for 90 days.  Dispense: 90 capsule; Refill: 0  -     gabapentin (Neurontin) 300 MG capsule; Take 1 capsule by mouth Take As Directed. 1 capsule PO q evening meal, 1 capsule po q hs  Dispense: 60 capsule; Refill: 2    2. Major depressive disorder, recurrent episode, moderate  -     venlafaxine XR (Effexor XR) 150 MG 24 hr capsule; Take 1 capsule by mouth Daily for 90 days.  Dispense: 90 capsule; Refill: 0    3. Primary insomnia    I spent 36 minutes caring for Colleen on this date of service. This time includes time spent by me in the following activities: preparing for the visit, reviewing tests, obtaining and/or reviewing a separately obtained history, performing a medically appropriate examination and/or evaluation, counseling and educating the patient/family/caregiver, ordering medications, tests, or procedures, and documenting information in the medical record.

## 2024-08-21 ENCOUNTER — OFFICE (OUTPATIENT)
Age: 64
End: 2024-08-21
Payer: COMMERCIAL

## 2024-08-21 ENCOUNTER — OFFICE (OUTPATIENT)
Dept: URBAN - METROPOLITAN AREA CLINIC 46 | Facility: CLINIC | Age: 64
End: 2024-08-21
Payer: COMMERCIAL

## 2024-08-21 VITALS — HEIGHT: 64 IN | HEIGHT: 64 IN | HEIGHT: 64 IN | HEIGHT: 64 IN | HEIGHT: 64 IN | HEIGHT: 64 IN | HEIGHT: 64 IN

## 2024-08-21 DIAGNOSIS — K76.0 FATTY (CHANGE OF) LIVER, NOT ELSEWHERE CLASSIFIED: ICD-10-CM

## 2024-08-21 PROCEDURE — 76981 USE PARENCHYMA: CPT | Performed by: INTERNAL MEDICINE

## 2024-09-25 ENCOUNTER — TELEPHONE (OUTPATIENT)
Dept: ONCOLOGY | Facility: CLINIC | Age: 64
End: 2024-09-25

## 2024-09-25 ENCOUNTER — OFFICE VISIT (OUTPATIENT)
Dept: PSYCHIATRY | Facility: HOSPITAL | Age: 64
End: 2024-09-25
Payer: COMMERCIAL

## 2024-09-25 DIAGNOSIS — F41.1 GENERALIZED ANXIETY DISORDER: Primary | ICD-10-CM

## 2024-09-25 DIAGNOSIS — G62.9 NEUROPATHY: ICD-10-CM

## 2024-09-25 RX ORDER — GABAPENTIN 100 MG/1
100 CAPSULE ORAL 3 TIMES DAILY
Qty: 90 CAPSULE | Refills: 2 | Status: SHIPPED | OUTPATIENT
Start: 2024-09-25 | End: 2025-09-25

## 2024-09-25 NOTE — TELEPHONE ENCOUNTER
Caller: Colleen Yanes    Relationship to patient: Self    Best call back number: [phone]    Chief complaint: R/S    Type of visit: LAB, FOLLOW UP 1    Requested date: NEXT AVLIABLE WEDNESDAY MORNING      If rescheduling, when is the original appointment: 10-2     Additional notes: PT UNABLE TO DO LATE APPOINTMENT ON 10-2

## 2024-09-26 ENCOUNTER — TELEPHONE (OUTPATIENT)
Dept: ONCOLOGY | Facility: CLINIC | Age: 64
End: 2024-09-26
Payer: COMMERCIAL

## 2024-10-01 ENCOUNTER — HOSPITAL ENCOUNTER (OUTPATIENT)
Dept: OCCUPATIONAL THERAPY | Facility: HOSPITAL | Age: 64
Setting detail: THERAPIES SERIES
Discharge: HOME | End: 2024-10-01
Payer: COMMERCIAL

## 2024-10-01 DIAGNOSIS — Z17.0 MALIGNANT NEOPLASM OF OVERLAPPING SITES OF LEFT BREAST IN FEMALE, ESTROGEN RECEPTOR POSITIVE: ICD-10-CM

## 2024-10-01 DIAGNOSIS — Z91.89 AT RISK FOR LYMPHEDEMA: Primary | ICD-10-CM

## 2024-10-01 DIAGNOSIS — C50.812 MALIGNANT NEOPLASM OF OVERLAPPING SITES OF LEFT BREAST IN FEMALE, ESTROGEN RECEPTOR POSITIVE: ICD-10-CM

## 2024-10-01 PROCEDURE — 93702 BIS XTRACELL FLUID ANALYSIS: CPT

## 2024-10-01 PROCEDURE — 97535 SELF CARE MNGMENT TRAINING: CPT

## 2024-10-01 NOTE — THERAPY PROGRESS REPORT/RE-CERT
Outpatient Occupational Therapy Lymphedema Progress Note  [facility]     Patient Name: Colleen Yanes  : 1960  MRN: [id number]  Today's Date: 10/1/2024      Visit Date: 10/01/2024    Patient Active Problem List   Diagnosis    Well woman exam with routine gynecological exam    Malignant neoplasm of overlapping sites of left breast in female, estrogen receptor positive    Major depressive disorder, recurrent episode, moderate    Generalized anxiety disorder    Bereavement    Panic disorder    Pain    Primary insomnia        Past Medical History:   Diagnosis Date    Anxiety     Depression     Diabetes mellitus     Emotional disorder 2024    recent loss of brother and diagnosis of left breast cancer    Facial basal cell cancer     using cream    GERD (gastroesophageal reflux disease)     H/O Arrhythmia     Heart palpitations     Hemorrhoid     History of mononucleosis     Hyperlipidemia     Hypertension     Malignant neoplasm of left breast     Restless leg     Sleep apnea     cpap        Past Surgical History:   Procedure Laterality Date    BREAST BIOPSY Left      AND     BREAST CYST ASPIRATION Left     BREAST RECONSTRUCTION Left 2024    Procedure: Left BREAST RECONSTRUCTION-GOLDILOCKS, TISSUE EXPANDER PLACEMENT WITH ACELLULAR DERMAL MATRIX (ADM);  Surgeon: Jaswinder Barbour MD;  Location: [facility];  Service: Plastics;  Laterality: Left;     SECTION      x2    COLONOSCOPY      Negative, Dr. Avelar    DILATATION AND CURETTAGE      ENDOSCOPY      MASTECTOMY W/ SENTINEL NODE BIOPSY Left 2024    Procedure: LEFT BREAST MASTECTOMY WITH SENTINEL NODE BIOPSY;  Surgeon: Riddhi Amezcua MD;  Location: [facility];  Service: General;  Laterality: Left;    US GUIDED CYST ASPIRATION BREAST N/A 2024         Visit Dx:      ICD-10-CM ICD-9-CM   1. At risk for lymphedema  Z91.89 V49.89   2. Malignant neoplasm of overlapping sites of left breast in female, estrogen  receptor positive  C50.812 174.8    Z17.0 V86.0        Lymphedema       Row Name 10/01/24 0900             Subjective Pain    Able to rate subjective pain? yes  -RE      Pre-Treatment Pain Level 0  -RE      Post-Treatment Pain Level 0  -RE         Left Upper Ext    Lt Upper Extremity Comments  L UE AROM is WFL  -RE         L-Dex Bioimpedence Screening    L-Dex Measurement Extremity LUE  -RE      L-Dex Patient Position Standing  -RE      L-Dex UE Dominate Side Right  -RE      L-Dex UE At Risk Side Left  -RE      L-Dex UE Pre Surgical Value Yes  -RE      L-Dex UE Score -1.9  -RE      L-Dex UE Baseline Score -2.9  -RE      L-Dex UE Value Change 1  -RE      L-Dex UE Comment WNL  -RE                User Key  (r) = Recorded By, (t) = Taken By, (c) = Cosigned By      Initials Name Provider Type    Yesi Nunez OTR Occupational Therapist                  The patient had a 3-month SOZO measurement which I reviewed today. The score is WNL  see scanned to EMR. Bioimpedance spectroscopy helps identify the   onset of lymphedema in an arm or leg before patients experience noticeable swelling. Research has   shown that 92% of patients with early detection of lymphedema using L-Dex combined with   intervention do not progress to chronic lymphedema through three years. Additionally, as of March 2023, the NCCN Guidelines® for Survivorship recommend proactive screening for lymphedema using   bioimpedance spectroscopy. Whenever possible, patients are tested for baseline L-Dex score before   cancer treatment begins and then are reassessed during regular follow-up visits using the SOZO device.   Otherwise, this can be started postoperatively and continued during regular follow-up visits. If the   patient’s L-Dex score increases above normal levels, that is a sign that lymphedema is developing and a   referral is made to occupational therapy for further evaluation and early compression treatment.   Lymphedema assessment with the  ANUJZO L-Dex score is recommended to be done every 3 months for   the first 3 years and then every 6 months for years 4 and 5 followed by annually afterwards           OT Assessment/Plan       Row Name 10/01/24 0914          OT Assessment    Impairments Impaired lymphatic circulation  -RE     Assessment Comments Patient returns for bioimpedance reassessment.  Her last measurement was 3 months ago.  L-Dex value today is -1.9 which is within normal limits and consistent with her baseline.  She has no new complaints.  Today we reviewed signs and symptoms of lymphedema.  I recommended follow-up in 3 months.  -RE     OT Diagnosis At risk for lymphedema  -RE     OT Rehab Potential Good  -RE     Patient/caregiver participated in establishment of treatment plan and goals Yes  -RE     Patient would benefit from skilled therapy intervention Yes  -RE        OT Plan    OT Frequency Other (comment)  -RE     Predicted Duration of Therapy Intervention (OT) Bioimpedance every 3 months  -RE     Planned CPT's? OT SELF CARE/MGMT/TRAIN 15 MIN: 91166;OT BIS XTRACELL FLUID ANALYSIS: 65833  -RE     OT Plan Comments Follow-up in 3 months  -RE               User Key  (r) = Recorded By, (t) = Taken By, (c) = Cosigned By      Initials Name Provider Type    RE Yesi Pedroza, OTR Occupational Therapist                           OT Goals       Row Name 10/01/24 0900          OT Short Term Goals    STG 1 Patient will demonstrate proper awareness of “What is Lymphedema?” and “Healthy Habits” for improved prevention, management, care of symptoms and ease of transition to self-care of condition.  -RE     STG 1 Progress Met  -RE     STG 2 Patient to improve DASH/ QuickDASH score by 10 points in 4 weeks.  -RE     STG 2 Progress New  -RE     STG 3 Patient independent and compliant with initial home exercise program focused on gentle AROM and stretching to improve AROM to pre-surgical level.  -RE     STG 3 Progress Met  -RE        Long Term Goals    LTG  Date to Achieve 10/02/24  -RE     LTG 1 Patient will participate in bioimpedance scans every 3 months as a method of early detection of lymphedema to allow for early intervention.  -RE     LTG 1 Progress Met;Ongoing  -RE     LTG 2 Patient's bioimpedance score to remain below 3.6 for decreased risk of stage II lymphedema.  -RE     LTG 2 Progress Met;Ongoing  -RE     LTG 3 Patient to demonstrate increased left shoulder flexion to 160 to improve functional UE use and to restore pre operative AROM per patient perception.  -RE     LTG 3 Progress Met  -RE     LTG 4 Patient to demonstrate increased left shoulder abduction to 160 to improve functional UE use and to restore preoperative AROM per patient perception.  -RE     LTG 4 Progress Met  -RE        Time Calculation    OT Goal Re-Cert Due Date 01/01/25  -RE               User Key  (r) = Recorded By, (t) = Taken By, (c) = Cosigned By      Initials Name Provider Type    Yesi Nunez OTR Occupational Therapist                    Therapy Education  Education Details: Discussed patient's current L-Dex value of -1.9 and recommended sleeve wear during air travel and upper extremity exercise as well as heavy household activities.  I reviewed the signs and symptoms that would indicate the need to return for medical assessment and then to return to therapy including swelling, heaviness, or unusual muscle fatigue.  Provided patient with additional home exercises including: Shoulder row and hands and knees arm and leg slides.  Given: Symptoms/condition management, HEP  Program: Reinforced, New  How Provided: Verbal  Provided to: Patient  Level of Understanding: Teach back education performed, Verbalized  98751 - OT Self Care/Mgmt Minutes: 18                Time Calculation:   OT Start Time: 0905  OT Stop Time: 0933  OT Time Calculation (min): 28 min  Total Timed Code Minutes- OT: 18 minute(s)  Timed Charges  67831 - OT Self Care/Mgmt Minutes: 18  Untimed Charges  20780 - OT  Bioimpedence Rx Minutes: 10  Total Minutes  Timed Charges Total Minutes: 18  Untimed Charges Total Minutes: 10   Total Minutes: 28     Therapy Charges for Today       Code Description Service Date Service Provider Modifiers Qty    02338085007 HC OT SELF CARE/MGMT/TRAIN EA 15 MIN 10/1/2024 Yesi Pedroza OTR GO 1    54922061361 HC OT BIS XTRACELL FLUID ANALYSIS 10/1/2024 Yesi Pedroza OTR GO 1          Dictated utilizing Dragon dictation:  Much of this encounter note is an electronic transcription/translation of spoken language to printed text. The electronic translation of spoken language may permit erroneous, or at times, nonsensical words or phrases to be inadvertently transcribed; Although I have reviewed the note for such errors, some may still exist.              KARAN Jeong  10/1/2024

## 2024-10-30 ENCOUNTER — LAB (OUTPATIENT)
Dept: LAB | Facility: HOSPITAL | Age: 64
End: 2024-10-30
Payer: COMMERCIAL

## 2024-10-30 ENCOUNTER — OFFICE VISIT (OUTPATIENT)
Dept: ONCOLOGY | Facility: CLINIC | Age: 64
End: 2024-10-30
Payer: COMMERCIAL

## 2024-10-30 VITALS
RESPIRATION RATE: 16 BRPM | BODY MASS INDEX: 30.99 KG/M2 | HEART RATE: 77 BPM | OXYGEN SATURATION: 100 % | HEIGHT: 64 IN | TEMPERATURE: 97.9 F | DIASTOLIC BLOOD PRESSURE: 65 MMHG | WEIGHT: 181.5 LBS | SYSTOLIC BLOOD PRESSURE: 108 MMHG

## 2024-10-30 DIAGNOSIS — C50.812 MALIGNANT NEOPLASM OF OVERLAPPING SITES OF LEFT BREAST IN FEMALE, ESTROGEN RECEPTOR POSITIVE: Primary | ICD-10-CM

## 2024-10-30 DIAGNOSIS — Z17.0 MALIGNANT NEOPLASM OF OVERLAPPING SITES OF LEFT BREAST IN FEMALE, ESTROGEN RECEPTOR POSITIVE: ICD-10-CM

## 2024-10-30 DIAGNOSIS — Z17.0 MALIGNANT NEOPLASM OF OVERLAPPING SITES OF LEFT BREAST IN FEMALE, ESTROGEN RECEPTOR POSITIVE: Primary | ICD-10-CM

## 2024-10-30 DIAGNOSIS — C50.812 MALIGNANT NEOPLASM OF OVERLAPPING SITES OF LEFT BREAST IN FEMALE, ESTROGEN RECEPTOR POSITIVE: ICD-10-CM

## 2024-10-30 LAB
ALBUMIN SERPL-MCNC: 4.2 G/DL (ref 3.5–5.2)
ALBUMIN/GLOB SERPL: 1.7 G/DL
ALP SERPL-CCNC: 73 U/L (ref 39–117)
ALT SERPL W P-5'-P-CCNC: 22 U/L (ref 1–33)
ANION GAP SERPL CALCULATED.3IONS-SCNC: 15.9 MMOL/L (ref 5–15)
AST SERPL-CCNC: 18 U/L (ref 1–32)
BASOPHILS # BLD AUTO: 0.06 10*3/MM3 (ref 0–0.2)
BASOPHILS NFR BLD AUTO: 0.6 % (ref 0–1.5)
BILIRUB SERPL-MCNC: 0.2 MG/DL (ref 0–1.2)
BUN SERPL-MCNC: 17 MG/DL (ref 8–23)
BUN/CREAT SERPL: 23 (ref 7–25)
CALCIUM SPEC-SCNC: 9.4 MG/DL (ref 8.6–10.5)
CHLORIDE SERPL-SCNC: 103 MMOL/L (ref 98–107)
CO2 SERPL-SCNC: 23.1 MMOL/L (ref 22–29)
CREAT SERPL-MCNC: 0.74 MG/DL (ref 0.57–1)
DEPRECATED RDW RBC AUTO: 44.7 FL (ref 37–54)
EGFRCR SERPLBLD CKD-EPI 2021: 91 ML/MIN/1.73
EOSINOPHIL # BLD AUTO: 0.22 10*3/MM3 (ref 0–0.4)
EOSINOPHIL NFR BLD AUTO: 2.3 % (ref 0.3–6.2)
ERYTHROCYTE [DISTWIDTH] IN BLOOD BY AUTOMATED COUNT: 13.6 % (ref 12.3–15.4)
GLOBULIN UR ELPH-MCNC: 2.5 GM/DL
GLUCOSE SERPL-MCNC: 156 MG/DL (ref 65–99)
HCT VFR BLD AUTO: 39.8 % (ref 34–46.6)
HGB BLD-MCNC: 11.9 G/DL (ref 12–15.9)
IMM GRANULOCYTES # BLD AUTO: 0.02 10*3/MM3 (ref 0–0.05)
IMM GRANULOCYTES NFR BLD AUTO: 0.2 % (ref 0–0.5)
LYMPHOCYTES # BLD AUTO: 4 10*3/MM3 (ref 0.7–3.1)
LYMPHOCYTES NFR BLD AUTO: 41.2 % (ref 19.6–45.3)
MCH RBC QN AUTO: 26.9 PG (ref 26.6–33)
MCHC RBC AUTO-ENTMCNC: 29.9 G/DL (ref 31.5–35.7)
MCV RBC AUTO: 89.8 FL (ref 79–97)
MONOCYTES # BLD AUTO: 0.66 10*3/MM3 (ref 0.1–0.9)
MONOCYTES NFR BLD AUTO: 6.8 % (ref 5–12)
NEUTROPHILS NFR BLD AUTO: 4.74 10*3/MM3 (ref 1.7–7)
NEUTROPHILS NFR BLD AUTO: 48.9 % (ref 42.7–76)
NRBC BLD AUTO-RTO: 0 /100 WBC (ref 0–0.2)
PLATELET # BLD AUTO: 269 10*3/MM3 (ref 140–450)
PMV BLD AUTO: 8.9 FL (ref 6–12)
POTASSIUM SERPL-SCNC: 4.1 MMOL/L (ref 3.5–5.2)
PROT SERPL-MCNC: 6.7 G/DL (ref 6–8.5)
RBC # BLD AUTO: 4.43 10*6/MM3 (ref 3.77–5.28)
SODIUM SERPL-SCNC: 142 MMOL/L (ref 136–145)
WBC NRBC COR # BLD AUTO: 9.7 10*3/MM3 (ref 3.4–10.8)

## 2024-10-30 PROCEDURE — 36415 COLL VENOUS BLD VENIPUNCTURE: CPT

## 2024-10-30 PROCEDURE — 85025 COMPLETE CBC W/AUTO DIFF WBC: CPT

## 2024-10-30 PROCEDURE — 80053 COMPREHEN METABOLIC PANEL: CPT

## 2024-10-30 NOTE — PROGRESS NOTES
Subjective     REASON FOR CONSULTATION: mpT 2 N0 left breast cancer-30 mm invasive lobular grade 2 ER/MA positive HER2 negative and invasive ductal carcinoma with 15 mm ER/MA positive HER2 negative post mastectomy and sentinel node biopsy  Provide an opinion on any further workup or treatment                             REQUESTING PHYSICIAN: MD Temo Acharya MD    History of Present Illness patient is a 63-year-old lady with 2 breast cancers the same breast treated with mastectomy and sentinel node biopsy both of them was hormone +1 ductal and lobular the initial 1 had an Oncotype of 15 and we did an Oncotype on the second tumor that came back at 21 and based on this we called and Arimidex which she has been on now for the last 4 months    She had a bone density which was normal    So far she is tolerating Arimidex well her hot flashes are improved and also she is taking gabapentin at bedtime which is helping her sleep.    She has not checked her CPAP monitor in over 5 years and this may be malfunctioning I told her to have this also checked because of her issues with fatigue and sleep    Thankfully she did have her stress test done and she ended up having 3 stents in her coronaries and because of the aspirin and Plavix her reconstruction has been delayed    Mammogram is due again in March    On her CT angiogram we talked about the calcified granulomata and hilar nodes and we will do a follow-up CAT scan in December to see if there is any change    Past Medical History:   Diagnosis Date    Anxiety     Depression     Diabetes mellitus     Emotional disorder 04/2024    recent loss of brother and diagnosis of left breast cancer    Facial basal cell cancer     using cream    GERD (gastroesophageal reflux disease)     H/O Arrhythmia     Heart palpitations     Hemorrhoid     History of mononucleosis     Hyperlipidemia     Hypertension     Malignant neoplasm  of left breast     Restless leg     Sleep apnea     cpap        Past Surgical History:   Procedure Laterality Date    BREAST BIOPSY Left      AND     BREAST CYST ASPIRATION Left     BREAST RECONSTRUCTION Left 2024    Procedure: Left BREAST RECONSTRUCTION-GOLDILOCKS, TISSUE EXPANDER PLACEMENT WITH ACELLULAR DERMAL MATRIX (ADM);  Surgeon: Jaswinder Barbour MD;  Location: [facility];  Service: Plastics;  Laterality: Left;     SECTION      x2    COLONOSCOPY      Negative, Dr. 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 Amezcua and underwent bowel breast MRI with the findings of the 2 separate tumors in the left breast with no axillary adenopathy or internal mammary adenopathy and a benign right breast    Patient was taken to surgery had a mastectomy and sentinel node biopsy the findings of 2 separate tumors at 30 mm lobular cancer 6 o'clock position and a 15 mm ductal carcinoma 6:00.  There is perineural invasion around the ductal carcinoma but all margins were clear anterior margin was 1.5 mm and 1 sentinel node was negative for metastatic disease    She has done well post mastectomy with an expander in place    Oncotype DX score lot done on the larger tumor was 15-second Oncotype has been ordered  today and pending    She is  3 para 3 first childbirth was at age 27 she did not breast-feed  Menarche was at age 12 menopause at 53 but she was on birth control for 20 years and when she stopped her periods did not resume    Family history is positive for mother with breast cancer age 78 there is no other malignancy in the family significant coronary artery disease and heart disease    She is not currently a smoker but smoked for 20 years and quit at age 37 she has not had drinker  She has never had a DVT MI or stroke  She has not had a bone density in 10 years she is overdue for her colonoscopy  She has diabetes but no hypertension    Patient is tearful and crying and most of this is because her brother also  recently in the middle of her cancer diagnosis and she is not dealing with this well.  Her Effexor dose has been doubled from 75 to 154 days ago by our supportive oncology caregivers and hopefully this will help     Await second Oncotype score and, DEXA scan    Discussed in detail the rationale for adjuvant hormonal blockade especially if her second Oncotype is also below 25 which I suspect it  will be.  Discussed the prognostic and predictive value of the Oncotype DX score    The side effects and toxicities of the Aromatase inhibitors was discussed with the patient including, hot flashes, mood swings and hair thinning.Significant arthralgias and worsening bone density were also discussed. Baseline bone density evaluation was ordered.    We will call her in 2 weeks with a second Oncotype score she will be presented at tumor board because she is concerned that she may need radiation because of the perineural invasion but also because of a close anterior margin though no ink on tumor      Current Outpatient Medications on File Prior to Visit   Medication Sig Dispense Refill    Accu-Chek Radha Plus test strip 1 each by Other route Daily.      anastrozole (Arimidex) 1 MG tablet Take 1 tablet by mouth  Daily for 360 days. 90 tablet 3    clobetasol (TEMOVATE) 0.05 % ointment Apply 1 application topically to the appropriate area as directed 2 (Two) Times a Week. 60 g 2    gabapentin (Neurontin) 100 MG capsule Take 1 capsule by mouth 3 (Three) Times a Day. 90 capsule 2    gabapentin (Neurontin) 300 MG capsule Take 1 capsule by mouth Take As Directed. 1 capsule PO q evening meal, 1 capsule po q hs 60 capsule 2    Jardiance 25 MG tablet tablet Take 1 tablet by mouth After Lunch.      LORazepam (Ativan) 0.5 MG tablet Take 1/2 to 1 tablet by mouth up to twice daily as needed for anxiety 60 tablet 0    losartan (COZAAR) 25 MG tablet Take 1 tablet by mouth After Lunch.  1    metFORMIN ER (GLUCOPHAGE-XR) 500 MG 24 hr tablet Take 2 tablets by mouth 2 (Two) Times a Day.  3    metoprolol succinate XL (TOPROL-XL) 25 MG 24 hr tablet Take 1 tablet by mouth After Lunch.  1    naproxen sodium (ALEVE) 220 MG tablet Take 3 tablets by mouth 2 (Two) Times a Day As Needed for Mild Pain, Fever or Headache.      pramipexole (MIRAPEX) 0.5 MG tablet Take 1 tablet by mouth Every Night.      rosuvastatin (CRESTOR) 40 MG tablet Take 1 tablet by mouth After Lunch.      venlafaxine XR (Effexor XR) 150 MG 24 hr capsule Take 1 capsule by mouth Daily for 90 days. 90 capsule 0    vitamin D3 (vitamin d) 125 MCG (5000 UT) capsule capsule Take 1 capsule by mouth Daily.      Tirzepatide (Mounjaro) 12.5 MG/0.5ML solution pen-injector pen Inject 0.5 mL under the skin into the appropriate area as directed 1 (One) Time Per Week. Pt understands not to take till after surgery       No current facility-administered medications on file prior to visit.        ALLERGIES:    Allergies   Allergen Reactions    Bactrim [Sulfamethoxazole-Trimethoprim] Rash    Sulfa Antibiotics Itching and Rash    Lisinopril Cough    Simvastatin Myalgia     zocor        Social History     Socioeconomic History    Marital status:      Spouse name: Edwin    Number of children: 3  "  Tobacco Use    Smoking status: Former     Current packs/day: 1.00     Types: Cigarettes     Passive exposure: Never    Smokeless tobacco: Never    Tobacco comments:     quit age 35   smoked about a pack a week  for  20 years quit 1996   Vaping Use    Vaping status: Never Used   Substance and Sexual Activity    Alcohol use: Yes     Comment: yearly 6    Drug use: Not Currently     Types: Marijuana, Cocaine(coke)     Comment: age 20    Sexual activity: Yes     Birth control/protection: Post-menopausal        Family History   Problem Relation Age of Onset    Diabetes Mother     Heart disease Mother     Breast cancer Mother     Coronary artery disease Father     Heart disease Father     Heart attack Father     Diabetes Sister     Heart disease Sister     Coronary artery disease Sister     Squamous cell carcinoma Sister     Heart disease Brother     Diabetes Brother     Coronary artery disease Brother     Coronary artery disease Brother     Heart disease Brother     Diabetes Brother     Ovarian cancer Neg Hx     Uterine cancer Neg Hx     Colon cancer Neg Hx     Malig Hyperthermia Neg Hx         Review of Systems   Psychiatric/Behavioral:  Negative for dysphoric mood. The patient is not nervous/anxious.         Objective     Vitals:    10/30/24 0935   BP: 108/65   Pulse: 77   Resp: 16   Temp: 97.9 °F (36.6 °C)   TempSrc: Oral   SpO2: 100%   Weight: 82.3 kg (181 lb 8 oz)   Height: 162.6 cm (64.02\")   PainSc: 0-No pain         10/30/2024     9:40 AM   Current Status   ECOG score 0       Physical Exam   CONSTITUTIONAL:  Vital signs reviewed.  No distress, looks comfortable.  Tearful  EYES:  Conjunctivae and lids unremarkable.  PERRLA  EARS,NOSE,MOUTH,THROAT:  Ears and nose appear unremarkable.  Lips, teeth, gums appear unremarkable.  RESPIRATORY:  Normal respiratory effort.  Lungs clear to auscultation bilaterally.  BREASTS: Right breast benign left chest wall shows a expander in place  CARDIOVASCULAR:  Normal S1, S2.  No " murmurs rubs or gallops.  No significant lower extremity edema.  GASTROINTESTINAL: Abdomen appears unremarkable.  Nontender.  No hepatomegaly.  No splenomegaly.  LYMPHATIC:  No cervical, supraclavicular, axillary lymphadenopathy.  SKIN:  Warm.  No rashes.  PSYCHIATRIC:  Normal judgment and insight.  Normal mood and affect.      RECENT LABS:  Hematology WBC   Date Value Ref Range Status   10/30/2024 9.70 3.40 - 10.80 10*3/mm3 Final   06/15/2022 9.07 4.5 - 11.0 10*3/uL Final     RBC   Date Value Ref Range Status   10/30/2024 4.43 3.77 - 5.28 10*6/mm3 Final   06/15/2022 5.00 4.0 - 5.2 10*6/uL Final     Hemoglobin   Date Value Ref Range Status   10/30/2024 11.9 (L) 12.0 - 15.9 g/dL Final   06/15/2022 13.7 12.0 - 16.0 g/dL Final     Hematocrit   Date Value Ref Range Status   10/30/2024 39.8 34.0 - 46.6 % Final   06/15/2022 43.8 36.0 - 46.0 % Final     Platelets   Date Value Ref Range Status   10/30/2024 269 140 - 450 10*3/mm3 Final   06/15/2022 311 140 - 440 10*3/uL Final          3/22/24  Final Diagnosis   1. Left Breast, 6:00, Core Needle Biopsy for a Mass with Calcifications:               A. INVASIVE DUCTAL CARCINOMA, Moderately Differentiated; [facility] Histologic Grade II/III       (tubule score = 3, nuclear score = 3, mitoses score = 1), measuring at least 7 mm.               B. Negative for in situ carcinoma in this sample.               C. Negative for lymphovascular space invasion.               D. Calcifications associated with invasive tumor.  E. See biomarker template #1 and comment.     2. Left Breast, 12:00, Core Needle Biopsy for a Mass with Calcifications:               A. INVASIVE LOBULAR CARCINOMA, Moderately Differentiated; [facility] Histologic Grade II/III       (tubule score = 3, nuclear score = 2, mitoses score = 1), measuring at least 5 mm.               B. Low-grade ductal carcinoma in situ (DCIS), solid and cribriform types with associated calcifications.                   C. Atypical lobular  hyperplasia.               D. Negative for lymphovascular space invasion.               E. Calcifications also associated with invasive tumor.               F. See biomarker template #2 and comment.   Protocol posted: 12/13/2023BREAST BIOMARKER REPORTING TEMPLATE - 1  Test(s) Performed     Estrogen Receptor (ER) Status  Positive (greater than 10% of cells demonstrate nuclear positivity)   Percentage of Cells with Nuclear Positivity  %   Average Intensity of Staining  Strong   Test Type  Food and Drug Administration (FDA) cleared (test / vendor): [facility]   Primary Antibody  SP1   Scoring System  Yudith   Proportion Score  5   Intensity Score  3   Total Yudith Score  8   Test(s) Performed     Progesterone Receptor (PgR) Status  Positive   Percentage of Cells with Nuclear Positivity  81-90%   Average Intensity of Staining  Moderate   Test Type  Food and Drug Administration (FDA) cleared (test / vendor): [facility]   Primary Antibody  1E2   Scoring System  Yudith   Proportion Score  5   Intensity Score  2   Total Yudith Score  7   Test(s) Performed     HER2 by Immunohistochemistry  Negative (Score 1+)   Test Type  Food and Drug Administration (FDA) cleared (test / vendor): [facility]   Primary Antibody  4B5   Test(s) Performed  Ki-67   Ki-67 Percentage of Positive Nuclei  18 %     BREAST BIOMARKER REPORTING TEMPLATE - 2  Test(s) Performed     Estrogen Receptor (ER) Status  Positive (greater than 10% of cells demonstrate nuclear positivity)   Percentage of Cells with Nuclear Positivity  %   Average Intensity of Staining  Strong   Test Type  Food and Drug Administration (FDA) cleared (test / vendor): [facility]   Primary Antibody  SP1   Scoring System  Yudith   Proportion Score  5   Intensity Score  3   Total Yudith Score  8   Test(s) Performed     Progesterone Receptor (PgR) Status  Positive   Percentage of Cells with Nuclear Positivity  %   Average Intensity of Staining  Strong   Test Type  Food and Drug  Administration (FDA) cleared (test / vendor): [facility]   Primary Antibody  1E2   Scoring System  Yudith   Proportion Score  5   Intensity Score  3   Total Yudith Score  8   Test(s) Performed     HER2 by Immunohistochemistry  Negative (Score 1+)   Test Type  Food and Drug Administration (FDA) cleared (test / vendor): [facility]   Primary Antibody  4B5   Test(s) Performed  Ki-67   Ki-67 Percentage of Positive Nuclei  8 %     4/30/24  Synoptic Checklist  INVASIVE CARCINOMA OF THE BREAST: Resection (INVASIVE CARCINOMA OF THE BREAST: RESECTION - All Specimens) 8th Edition  - Protocol posted: 12/13/2023  SPECIMEN  Procedure Total mastectomy  Specimen Laterality Left  .  TUMOR  Tumor Site Clock position  6 o'clock  12 o'clock  Histologic Type Invasive lobular carcinoma  Histologic Grade ([facility] Histologic Score)  Glandular (Acinar) / Tubular Differentiation Score 3  Nuclear Pleomorphism Score 2  Mitotic Rate Score 1  Overall Grade Grade 2 (scores of 6 or 7)  Tumor Size Greatest dimension of largest invasive focus (Millimeters): 30 mm  Tumor Focality Multiple foci of invasive carcinoma  Number of Foci 2  Sizes of Individual Foci in Millimeters (mm) 30, 15  Ductal Carcinoma In Situ (DCIS) Not identified  Lobular Carcinoma In Situ (LCIS) Present  Lymphatic and / or Vascular Invasion Not identified  Dermal Lymphatic and / or Vascular Invasion Not identified  Microcalcifications Not identified  Treatment Effect in the Breast No known presurgical therapy  .  MARGINS  Margin Status for Invasive Carcinoma All margins negative for invasive carcinoma  Distance from Invasive Carcinoma to Closest Margin 1.5 mm  Closest Margin(s) to Invasive Carcinoma Anterior  Distance from Invasive Carcinoma to Anterior Margin 1.5 mm  Distance from Invasive Carcinoma to Posterior Margin 30 mm  Distance from Invasive Carcinoma to Superior Margin 50 mm  Distance from Invasive Carcinoma to Inferior Margin 40 mm  Distance from Invasive Carcinoma to  Medial Margin 70 mm  Distance from Invasive Carcinoma to Lateral Margin  pT Category pT2  T Suffix (m)  pN Category pN0  N Suffix (sn)  .      Assessment & Plan   Mp (2)T2N0 grade 2 IDC and ILC left breast post mastectomy and SLNB ER/AR + Her2 neg clear margins  Oncotype on larger tumor 15 which is the lobular cancer  Oncotype on ductal carcinoma 21    2.  Anxiety and depression-situational her regular dose of Effexor was doubled from 75 to 150-4 days ago    3.  Diabetes mellitus    4.  Hypertension hyperlipidemia on treatment    5.  Sleep apnea    6, calcified granulomata and hilar nodes noted on CT angiogram in 6/24    Plan  1.  Continue Arimidex 1 mg daily  2.  CT chest the end of December call for results  3.  Follow-up in 6  months has been scheduled

## 2024-11-06 ENCOUNTER — OFFICE VISIT (OUTPATIENT)
Dept: SURGERY | Facility: CLINIC | Age: 64
End: 2024-11-06
Payer: COMMERCIAL

## 2024-11-06 ENCOUNTER — OFFICE VISIT (OUTPATIENT)
Dept: PSYCHIATRY | Facility: HOSPITAL | Age: 64
End: 2024-11-06
Payer: COMMERCIAL

## 2024-11-06 VITALS
DIASTOLIC BLOOD PRESSURE: 70 MMHG | SYSTOLIC BLOOD PRESSURE: 132 MMHG | OXYGEN SATURATION: 98 % | HEIGHT: 64 IN | BODY MASS INDEX: 30.87 KG/M2 | WEIGHT: 180.8 LBS

## 2024-11-06 DIAGNOSIS — F41.1 GENERALIZED ANXIETY DISORDER: ICD-10-CM

## 2024-11-06 DIAGNOSIS — C50.812 MALIGNANT NEOPLASM OF OVERLAPPING SITES OF LEFT BREAST IN FEMALE, ESTROGEN RECEPTOR POSITIVE: Primary | ICD-10-CM

## 2024-11-06 DIAGNOSIS — G62.9 NEUROPATHY: ICD-10-CM

## 2024-11-06 DIAGNOSIS — Z12.31 VISIT FOR SCREENING MAMMOGRAM: ICD-10-CM

## 2024-11-06 DIAGNOSIS — Z17.0 MALIGNANT NEOPLASM OF OVERLAPPING SITES OF LEFT BREAST IN FEMALE, ESTROGEN RECEPTOR POSITIVE: Primary | ICD-10-CM

## 2024-11-06 RX ORDER — GABAPENTIN 100 MG/1
100 CAPSULE ORAL 3 TIMES DAILY
Qty: 90 CAPSULE | Refills: 2 | Status: SHIPPED | OUTPATIENT
Start: 2024-11-06 | End: 2025-11-06

## 2024-11-06 RX ORDER — PIOGLITAZONEHYDROCHLORIDE 15 MG/1
15 TABLET ORAL DAILY
COMMUNITY

## 2024-11-06 RX ORDER — PRASUGREL 10 MG/1
10 TABLET, FILM COATED ORAL DAILY
COMMUNITY

## 2024-11-06 RX ORDER — ASPIRIN 81 MG/1
81 TABLET ORAL DAILY
COMMUNITY
Start: 2024-07-09 | End: 2025-07-09

## 2024-11-06 RX ORDER — LORAZEPAM 0.5 MG/1
TABLET ORAL
Qty: 60 TABLET | Refills: 0 | Status: SHIPPED | OUTPATIENT
Start: 2024-11-06

## 2024-11-06 NOTE — PROGRESS NOTES
Breast Surgery Follow Up Note    Oncologic History:  Colleen Yanes is a 64 y.o. female with the following oncologic history:  Oncology/Hematology History   Malignant neoplasm of overlapping sites of left breast in female, estrogen receptor positive   3/22/2024 Cancer Staged    Staging form: Breast, AJCC 8th Edition  - Clinical stage from 3/22/2024: Stage IA (cT1c, cN0, cM0, G3, ER+, HI+, HER2-) - Signed by Riddhi Amezcua MD on 4/5/2024 4/5/2024 Initial Diagnosis    Malignant neoplasm of overlapping sites of left breast in female, estrogen receptor positive          Interval History: feels well overall. On AI. Continues with supportive oncology. Completed mastectomy and reconstruction - still has tissue expander in - some discomfort with TE    Past Medical History:   Diagnosis Date    Anxiety     Depression     Diabetes mellitus     Emotional disorder 04/2024    recent loss of brother and diagnosis of left breast cancer    Facial basal cell cancer     using cream    GERD (gastroesophageal reflux disease)     H/O Arrhythmia     Heart palpitations     Hemorrhoid     History of mononucleosis     Hyperlipidemia     Hypertension     Malignant neoplasm of left breast 2024    Restless leg     Sleep apnea     cpap     Patient Active Problem List   Diagnosis    Well woman exam with routine gynecological exam    Malignant neoplasm of overlapping sites of left breast in female, estrogen receptor positive    Major depressive disorder, recurrent episode, moderate    Generalized anxiety disorder    Bereavement    Panic disorder    Pain    Primary insomnia     Current Outpatient Medications on File Prior to Visit   Medication Sig Dispense Refill    Accu-Chek Radha Plus test strip 1 each by Other route Daily.      anastrozole (Arimidex) 1 MG tablet Take 1 tablet by mouth Daily for 360 days. 90 tablet 3    clobetasol (TEMOVATE) 0.05 % ointment Apply 1 application topically to the appropriate area as directed 2 (Two) Times a Week.  60 g 2    gabapentin (Neurontin) 100 MG capsule Take 1 capsule by mouth 3 (Three) Times a Day. 90 capsule 2    gabapentin (Neurontin) 300 MG capsule Take 1 capsule by mouth Take As Directed. 1 capsule PO q evening meal, 1 capsule po q hs 60 capsule 2    Jardiance 25 MG tablet tablet Take 1 tablet by mouth After Lunch.      LORazepam (Ativan) 0.5 MG tablet Take 1/2 to 1 tablet by mouth up to twice daily as needed for anxiety 60 tablet 0    losartan (COZAAR) 25 MG tablet Take 1 tablet by mouth After Lunch.  1    metFORMIN ER (GLUCOPHAGE-XR) 500 MG 24 hr tablet Take 2 tablets by mouth 2 (Two) Times a Day.  3    metoprolol succinate XL (TOPROL-XL) 25 MG 24 hr tablet Take 1 tablet by mouth After Lunch.  1    naproxen sodium (ALEVE) 220 MG tablet Take 3 tablets by mouth 2 (Two) Times a Day As Needed for Mild Pain, Fever or Headache.      pramipexole (MIRAPEX) 0.5 MG tablet Take 1 tablet by mouth Every Night.      rosuvastatin (CRESTOR) 40 MG tablet Take 1 tablet by mouth After Lunch.      Tirzepatide (Mounjaro) 12.5 MG/0.5ML solution pen-injector pen Inject 0.5 mL under the skin into the appropriate area as directed 1 (One) Time Per Week. Pt understands not to take till after surgery      venlafaxine XR (Effexor XR) 150 MG 24 hr capsule Take 1 capsule by mouth Daily for 90 days. 90 capsule 0    vitamin D3 (vitamin d) 125 MCG (5000 UT) capsule capsule Take 1 capsule by mouth Daily.       No current facility-administered medications on file prior to visit.     Allergies   Allergen Reactions    Bactrim [Sulfamethoxazole-Trimethoprim] Rash    Sulfa Antibiotics Itching and Rash    Lisinopril Cough    Simvastatin Myalgia     zocor       Past Surgical History:   Procedure Laterality Date    BREAST BIOPSY Left     2020 AND 2024    BREAST CYST ASPIRATION Left 2024    BREAST RECONSTRUCTION Left 04/30/2024    Procedure: Left BREAST RECONSTRUCTION-GOLDILOCKS, TISSUE EXPANDER PLACEMENT WITH ACELLULAR DERMAL MATRIX (ADM);  Surgeon:  Jaswinder Barbour MD;  Location: [facility] MAIN OR;  Service: Plastics;  Laterality: Left;     SECTION      x2    COLONOSCOPY      Negative, Dr. Avelar    DILATATION AND CURETTAGE      ENDOSCOPY      MASTECTOMY W/ SENTINEL NODE BIOPSY Left 2024    Procedure: LEFT BREAST MASTECTOMY WITH SENTINEL NODE BIOPSY;  Surgeon: Riddhi Amezcua MD;  Location: [facility] MAIN OR;  Service: General;  Laterality: Left;    US GUIDED CYST ASPIRATION BREAST N/A 2024     Social History     Socioeconomic History    Marital status:      Spouse name: Edwin    Number of children: 3   Tobacco Use    Smoking status: Former     Current packs/day: 1.00     Types: Cigarettes     Passive exposure: Never    Smokeless tobacco: Never    Tobacco comments:     quit age 35   smoked about a pack a week  for  20 years quit    Vaping Use    Vaping status: Never Used   Substance and Sexual Activity    Alcohol use: Yes     Comment: yearly 6    Drug use: Not Currently     Types: Marijuana, Cocaine(coke)     Comment: age 20    Sexual activity: Yes     Birth control/protection: Post-menopausal     Family History   Problem Relation Age of Onset    Diabetes Mother     Heart disease Mother     Breast cancer Mother     Coronary artery disease Father     Heart disease Father     Heart attack Father     Diabetes Sister     Heart disease Sister     Coronary artery disease Sister     Squamous cell carcinoma Sister     Heart disease Brother     Diabetes Brother     Coronary artery disease Brother     Coronary artery disease Brother     Heart disease Brother     Diabetes Brother     Ovarian cancer Neg Hx     Uterine cancer Neg Hx     Colon cancer Neg Hx     Malig Hyperthermia Neg Hx         Review of Systems:  CONSTITUTIONAL: No weight loss, fever, chills, weakness or fatigue.  HEENT: Eyes: No visual loss, blurred vision, double vision or yellow sclerae. Ears, Nose, Throat: No hearing loss, sneezing, congestion, runny nose or sore  throat.  SKIN: No rash or itching.  CARDIOVASCULAR: No chest pain, chest pressure or chest discomfort. No palpitations or edema.  RESPIRATORY: No shortness of breath, cough or sputum.  GASTROINTESTINAL: No anorexia, nausea, vomiting or diarrhea. No abdominal pain or blood.  GENITOURINARY: No burning on urination  NEUROLOGICAL: No headache, dizziness, syncope, paralysis, ataxia, numbness or tingling in the extremities. No change in bowel or bladder control.  MUSCULOSKELETAL: No muscle, back pain, joint pain or stiffness.  HEMATOLOGIC: No anemia, bleeding or bruising.  LYMPHATICS: No enlarged nodes.  PSYCHIATRIC: No history of depression or anxiety.  ENDOCRINOLOGIC: No reports of sweating, cold or heat intolerance. No polyuria or polydipsia.  ALLERGIES: No history of asthma, hives, eczema or rhinitis.    Physical Exam:  There were no vitals filed for this visit.      General: Alert, cooperative    HEENT: Atraumatic, normocephalic    Oral/Maxillofacial: Moist mucous membranes    Neck: Supple     Lungs: EWOB, clear to auscultation bilaterally     Heart: RRR    Breast: Bilateral breasts examined sitting and supine.  RIGHT- no masses, skin changes, or nipple discharge  LEFT- tissue expander in place, no skin nodularities.     Lymphatics:  Bilateral supraclavicular, cervical, and axillary basins without lymphadneopathy    Abdomen: Soft, non-tender, non-distended    Extremities: normal strength and sensation.  No obvious deformities.     Neuro: alert, normal speech, no focal findings or movement disorder noted     Skin: no lesions or abrasions      Recent Imaging:  NA    Assessment/Plan: Colleen Yanes is a 64 y.o.female with h/o Anatomic Stage IIA, Prognostic stage IA, left breast cancer, CAROL. S/p mastectomy and sentinel lymph node biopsy with goldilocks incision - completed April 30, 2024.     - RTC 6 for clinical breast exam - will follow up with Hermann for ongoing management of   - Next imaging due 3/2025 at [facility]  - follows  with Dr Smith - on arimidex   - no radiation therapy following mastectomy   - follows with Carlotta Payne  - established with nurse navigator  - no lymphedema concerns    Riddhi Amezcua MD  Breast Surgical Oncology  I spent 20 minutes caring for Ms Yanes on this date of service. This time includes time spent by me in the following activities: preparing for the visit, obtaining and/or reviewing a separately obtained history, performing a medically appropriate examination and/or evaluation , counseling and educating the patient/family/caregiver, ordering medications, tests, or procedures, referring and communicating with other health care professionals , documenting information in the medical record, independently interpreting results and communicating that information with the patient/family/caregiver, and care coordination.

## 2024-11-06 NOTE — LETTER
November 7, 2024     Temo Schuster MD  [address]  [place] [address]    Patient: Colleen Yanes   YOB: 1960   Date of Visit: 11/6/2024     Dear Temo Schuster MD:       Thank you for referring Colleen Yanes to me for evaluation. Below are the relevant portions of my assessment and plan of care.    If you have questions, please do not hesitate to call me. I look forward to following Colleen along with you.         Sincerely,        Riddhi Amezcua MD        CC: MD Goldie Aggarwal Sarah, MD  11/07/24 6822  Sign when Signing Visit  Breast Surgery Follow Up Note    Oncologic History:  Colleen Yanes is a 64 y.o. female with the following oncologic history:  Oncology/Hematology History   Malignant neoplasm of overlapping sites of left breast in female, estrogen receptor positive   3/22/2024 Cancer Staged    Staging form: Breast, AJCC 8th Edition  - Clinical stage from 3/22/2024: Stage IA (cT1c, cN0, cM0, G3, ER+, MN+, HER2-) - Signed by Riddhi Amezcua MD on 4/5/2024 4/5/2024 Initial Diagnosis    Malignant neoplasm of overlapping sites of left breast in female, estrogen receptor positive          Interval History: feels well overall. On AI. Continues with supportive oncology. Completed mastectomy and reconstruction - still has tissue expander in - some discomfort with TE    Past Medical History:   Diagnosis Date   • Anxiety    • Depression    • Diabetes mellitus    • Emotional disorder 04/2024    recent loss of brother and diagnosis of left breast cancer   • Facial basal cell cancer     using cream   • GERD (gastroesophageal reflux disease)    • H/O Arrhythmia    • Heart palpitations    • Hemorrhoid    • History of mononucleosis    • Hyperlipidemia    • Hypertension    • Malignant neoplasm of left breast 2024   • Restless leg    • Sleep apnea     cpap     Patient Active Problem List   Diagnosis   • Well woman exam with routine gynecological exam   • Malignant neoplasm of  overlapping sites of left breast in female, estrogen receptor positive   • Major depressive disorder, recurrent episode, moderate   • Generalized anxiety disorder   • Bereavement   • Panic disorder   • Pain   • Primary insomnia     Current Outpatient Medications on File Prior to Visit   Medication Sig Dispense Refill   • Accu-Chek Radha Plus test strip 1 each by Other route Daily.     • anastrozole (Arimidex) 1 MG tablet Take 1 tablet by mouth Daily for 360 days. 90 tablet 3   • clobetasol (TEMOVATE) 0.05 % ointment Apply 1 application topically to the appropriate area as directed 2 (Two) Times a Week. 60 g 2   • gabapentin (Neurontin) 100 MG capsule Take 1 capsule by mouth 3 (Three) Times a Day. 90 capsule 2   • gabapentin (Neurontin) 300 MG capsule Take 1 capsule by mouth Take As Directed. 1 capsule PO q evening meal, 1 capsule po q hs 60 capsule 2   • Jardiance 25 MG tablet tablet Take 1 tablet by mouth After Lunch.     • LORazepam (Ativan) 0.5 MG tablet Take 1/2 to 1 tablet by mouth up to twice daily as needed for anxiety 60 tablet 0   • losartan (COZAAR) 25 MG tablet Take 1 tablet by mouth After Lunch.  1   • metFORMIN ER (GLUCOPHAGE-XR) 500 MG 24 hr tablet Take 2 tablets by mouth 2 (Two) Times a Day.  3   • metoprolol succinate XL (TOPROL-XL) 25 MG 24 hr tablet Take 1 tablet by mouth After Lunch.  1   • naproxen sodium (ALEVE) 220 MG tablet Take 3 tablets by mouth 2 (Two) Times a Day As Needed for Mild Pain, Fever or Headache.     • pramipexole (MIRAPEX) 0.5 MG tablet Take 1 tablet by mouth Every Night.     • rosuvastatin (CRESTOR) 40 MG tablet Take 1 tablet by mouth After Lunch.     • Tirzepatide (Mounjaro) 12.5 MG/0.5ML solution pen-injector pen Inject 0.5 mL under the skin into the appropriate area as directed 1 (One) Time Per Week. Pt understands not to take till after surgery     • venlafaxine XR (Effexor XR) 150 MG 24 hr capsule Take 1 capsule by mouth Daily for 90 days. 90 capsule 0   • vitamin D3  (vitamin d) 125 MCG (5000 UT) capsule capsule Take 1 capsule by mouth Daily.       No current facility-administered medications on file prior to visit.     Allergies   Allergen Reactions   • Bactrim [Sulfamethoxazole-Trimethoprim] Rash   • Sulfa Antibiotics Itching and Rash   • Lisinopril Cough   • Simvastatin Myalgia     zocor       Past Surgical History:   Procedure Laterality Date   • BREAST BIOPSY Left      AND    • BREAST CYST ASPIRATION Left    • BREAST RECONSTRUCTION Left 2024    Procedure: Left BREAST RECONSTRUCTION-GOLDILOCKS, TISSUE EXPANDER PLACEMENT WITH ACELLULAR DERMAL MATRIX (ADM);  Surgeon: Jaswinder Barbour MD;  Location: [facility];  Service: Plastics;  Laterality: Left;   •  SECTION      x2   • COLONOSCOPY      Negative, Dr. Avelar   • DILATATION AND CURETTAGE     • ENDOSCOPY     • MASTECTOMY W/ SENTINEL NODE BIOPSY Left 2024    Procedure: LEFT BREAST MASTECTOMY WITH SENTINEL NODE BIOPSY;  Surgeon: Riddhi Amezcua MD;  Location: [facility];  Service: General;  Laterality: Left;   • US GUIDED CYST ASPIRATION BREAST N/A 2024     Social History     Socioeconomic History   • Marital status:      Spouse name: Edwin   • Number of children: 3   Tobacco Use   • Smoking status: Former     Current packs/day: 1.00     Types: Cigarettes     Passive exposure: Never   • Smokeless tobacco: Never   • Tobacco comments:     quit age 35   smoked about a pack a week  for  20 years quit 1996   Vaping Use   • Vaping status: Never Used   Substance and Sexual Activity   • Alcohol use: Yes     Comment: yearly 6   • Drug use: Not Currently     Types: Marijuana, Cocaine(coke)     Comment: age 20   • Sexual activity: Yes     Birth control/protection: Post-menopausal     Family History   Problem Relation Age of Onset   • Diabetes Mother    • Heart disease Mother    • Breast cancer Mother    • Coronary artery disease Father    • Heart disease Father    • Heart attack  Father    • Diabetes Sister    • Heart disease Sister    • Coronary artery disease Sister    • Squamous cell carcinoma Sister    • Heart disease Brother    • Diabetes Brother    • Coronary artery disease Brother    • Coronary artery disease Brother    • Heart disease Brother    • Diabetes Brother    • Ovarian cancer Neg Hx    • Uterine cancer Neg Hx    • Colon cancer Neg Hx    • Malig Hyperthermia Neg Hx         Review of Systems:  CONSTITUTIONAL: No weight loss, fever, chills, weakness or fatigue.  HEENT: Eyes: No visual loss, blurred vision, double vision or yellow sclerae. Ears, Nose, Throat: No hearing loss, sneezing, congestion, runny nose or sore throat.  SKIN: No rash or itching.  CARDIOVASCULAR: No chest pain, chest pressure or chest discomfort. No palpitations or edema.  RESPIRATORY: No shortness of breath, cough or sputum.  GASTROINTESTINAL: No anorexia, nausea, vomiting or diarrhea. No abdominal pain or blood.  GENITOURINARY: No burning on urination  NEUROLOGICAL: No headache, dizziness, syncope, paralysis, ataxia, numbness or tingling in the extremities. No change in bowel or bladder control.  MUSCULOSKELETAL: No muscle, back pain, joint pain or stiffness.  HEMATOLOGIC: No anemia, bleeding or bruising.  LYMPHATICS: No enlarged nodes.  PSYCHIATRIC: No history of depression or anxiety.  ENDOCRINOLOGIC: No reports of sweating, cold or heat intolerance. No polyuria or polydipsia.  ALLERGIES: No history of asthma, hives, eczema or rhinitis.    Physical Exam:  There were no vitals filed for this visit.      General: Alert, cooperative    HEENT: Atraumatic, normocephalic    Oral/Maxillofacial: Moist mucous membranes    Neck: Supple     Lungs: EWOB, clear to auscultation bilaterally     Heart: RRR    Breast: Bilateral breasts examined sitting and supine.  RIGHT- no masses, skin changes, or nipple discharge  LEFT- tissue expander in place, no skin nodularities.     Lymphatics:  Bilateral supraclavicular,  cervical, and axillary basins without lymphadneopathy    Abdomen: Soft, non-tender, non-distended    Extremities: normal strength and sensation.  No obvious deformities.     Neuro: alert, normal speech, no focal findings or movement disorder noted     Skin: no lesions or abrasions      Recent Imaging:  NA    Assessment/Plan: Colleen Yanes is a 64 y.o.female with h/o Anatomic Stage IIA, Prognostic stage IA, left breast cancer, CAROL. S/p mastectomy and sentinel lymph node biopsy with goldilocks incision - completed April 30, 2024.     - RTC 6 for clinical breast exam - will follow up with Hermann for ongoing management of   - Next imaging due 3/2025 at [facility]  - follows with Dr Smith - on arimidex   - no radiation therapy following mastectomy   - follows with Carlotta Payne  - established with nurse navigator  - no lymphedema concerns    Riddhi Amezcua MD  Breast Surgical Oncology  I spent 20 minutes caring for Ms Yanes on this date of service. This time includes time spent by me in the following activities: preparing for the visit, obtaining and/or reviewing a separately obtained history, performing a medically appropriate examination and/or evaluation , counseling and educating the patient/family/caregiver, ordering medications, tests, or procedures, referring and communicating with other health care professionals , documenting information in the medical record, independently interpreting results and communicating that information with the patient/family/caregiver, and care coordination.

## 2024-11-06 NOTE — PROGRESS NOTES
Subjective  Patient ID: Colleen Yanes is a 64 y.o.. female seen in regularly scheduled group session.  Group participants have consented to group conducted in person    Total Group Time, Face to Face: 65 minutes  Total Group Participants: 2, plus facilitation per YONATHAN Vargas     Group Therapy  Group topics explored including development of new normal, interpersonal sensitivity, short and long term effects of diagnosis and treatment, significant other or family conflict, anticipitory anxiety, pain management, physical deconditioning, weight management, and lifestyle choices. Members share ongoing difficulties with physical appearance, upcoming surgeries, continued medication needs. Shares shared experience of disrupted sleep, elevated anxiety. Reviewed grief and loss in survivorship, continued processing of previous expeirenced, failed expectations of support community.     Counseling provided regarding cognitive behavioral therapy (CBT) strategies, behavioral activation/ activity scheduling, reintroduction to activity through graded tasks, balancing avoidance with approach , sleep hygiene, discussion of need for restorative sleep with consideration of sleep apnea, recognition and allowance of need for rest, pharmacological and non pharmacological management of sleep disturbance, lifestyle counseling, benefits of exercise and strategies for managing barriers to engagement, allowance of self care, exploration of quality of life goals, survivorship issues, anxiety/ mood management , medication education, and existential distress. Supported research and education while allowing boundaries and effective communication with medical providers. Considered benefit of physical activity. Explored emotions of grief and anger, allowing processing of these with plans for forward movement.     Discussed current programming available in [facility] and community.     Benefits of group discussed while also acknowledging  the need for 1:1 consultation at times. Members invited to discuss any concerns privately with me following group setting or to schedule a 1:1 visit if needs not being met in group.     Next shared medical visit: December 4 at 3:30 PM in person    Patient response to group: Pt shares openly in group setting,fully engaged with other group member.    Medications Management  YONATHAN Vargas present for medication discussion and management.  Pt continues in survivorship of breast cancer.    CC: Insomnia  HPI: Pt is seen in follow up regarding anxiety, sleep disruption, and hot flashes in survivorship of breast cancer. Continues to feel significant improvement with adherence to venlafaxine  mg daily, gabapentin 300 mg q hs, and ativan PRN insomnia. Remains on Arimidex, tolerating generally well while continuing to mourn disrupted sleep and hair loss. Has appreciated appx 4 hours of sleep with gabapentin, although waking earlier than desired. Is able to take ativan alongside this, finding sleep to be manageable while desiring more. Has not increased gabapentin. Continues to wear CPAP for diagnosed DOMINGA. Hot flashes have improved, appx twice weekly. Reports improved mastery in work environment, focusing better, and apprecaiting return of previous self. Continues with expanders in place, hopeful for final reconstruction in January. Fortunately, does not think about this all the time, while aware of it appx 50% of the time. Continues to check in with OT, independently performing exercises at home. Appreciates gradual improvement in mobility, able to fix hair ,etc.   Exam: As Above  Current medication regimen: gabapentin 300 mg q hs, venlafaxine  mg daily, ativan 0.5 mg PRN anxiety, insomnia  Lab Review:   Lab on 10/30/2024   Component Date Value    Glucose 10/30/2024 156 (H)     BUN 10/30/2024 17     Creatinine 10/30/2024 0.74     Sodium 10/30/2024 142     Potassium 10/30/2024 4.1     Chloride 10/30/2024 103      CO2 10/30/2024 23.1     Calcium 10/30/2024 9.4     Total Protein 10/30/2024 6.7     Albumin 10/30/2024 4.2     ALT (SGPT) 10/30/2024 22     AST (SGOT) 10/30/2024 18     Alkaline Phosphatase 10/30/2024 73     Total Bilirubin 10/30/2024 0.2     Globulin 10/30/2024 2.5     A/G Ratio 10/30/2024 1.7     BUN/Creatinine Ratio 10/30/2024 23.0     Anion Gap 10/30/2024 15.9 (H)     eGFR 10/30/2024 91.0     WBC 10/30/2024 9.70     RBC 10/30/2024 4.43     Hemoglobin 10/30/2024 11.9 (L)     Hematocrit 10/30/2024 39.8     MCV 10/30/2024 89.8     MCH 10/30/2024 26.9     MCHC 10/30/2024 29.9 (L)     RDW 10/30/2024 13.6     RDW-SD 10/30/2024 44.7     MPV 10/30/2024 8.9     Platelets 10/30/2024 269     Neutrophil % 10/30/2024 48.9     Lymphocyte % 10/30/2024 41.2     Monocyte % 10/30/2024 6.8     Eosinophil % 10/30/2024 2.3     Basophil % 10/30/2024 0.6     Immature Grans % 10/30/2024 0.2     Neutrophils, Absolute 10/30/2024 4.74     Lymphocytes, Absolute 10/30/2024 4.00 (H)     Monocytes, Absolute 10/30/2024 0.66     Eosinophils, Absolute 10/30/2024 0.22     Basophils, Absolute 10/30/2024 0.06     Immature Grans, Absolute 10/30/2024 0.02     nRBC 10/30/2024 0.0      MDM:  Meds reviewed and renewed as appropriate.  Continue medications as written. Education provided surrounding potential benefit of increase in gabapentin. Reviewed ability to increase at dinner and/or potential benefit to 100 mg dosing PRN mid insomnia. Supported patient in ongoing sleep hygiene, recognition of appropriate daytime functioning.  FU scheduled in group setting.    Diagnoses and all orders for this visit:    1. Generalized anxiety disorder  -     LORazepam (Ativan) 0.5 MG tablet; Take 1/2 to 1 tablet by mouth up to twice daily as needed for anxiety  Dispense: 60 tablet; Refill: 0  -     gabapentin (Neurontin) 100 MG capsule; Take 1 capsule by mouth 3 (Three) Times a Day.  Dispense: 90 capsule; Refill: 2    2. Neuropathy  -     gabapentin (Neurontin)  100 MG capsule; Take 1 capsule by mouth 3 (Three) Times a Day.  Dispense: 90 capsule; Refill: 2

## 2024-12-04 ENCOUNTER — OFFICE VISIT (OUTPATIENT)
Dept: PSYCHIATRY | Facility: HOSPITAL | Age: 64
End: 2024-12-04
Payer: COMMERCIAL

## 2024-12-04 DIAGNOSIS — F51.01 PRIMARY INSOMNIA: ICD-10-CM

## 2024-12-04 DIAGNOSIS — C50.812 MALIGNANT NEOPLASM OF OVERLAPPING SITES OF LEFT BREAST IN FEMALE, ESTROGEN RECEPTOR POSITIVE: ICD-10-CM

## 2024-12-04 DIAGNOSIS — F41.1 GENERALIZED ANXIETY DISORDER: Primary | ICD-10-CM

## 2024-12-04 DIAGNOSIS — Z17.0 MALIGNANT NEOPLASM OF OVERLAPPING SITES OF LEFT BREAST IN FEMALE, ESTROGEN RECEPTOR POSITIVE: ICD-10-CM

## 2024-12-04 DIAGNOSIS — G62.9 NEUROPATHY: ICD-10-CM

## 2024-12-04 PROCEDURE — 90853 GROUP PSYCHOTHERAPY: CPT | Performed by: NURSE PRACTITIONER

## 2024-12-04 PROCEDURE — 99213 OFFICE O/P EST LOW 20 MIN: CPT | Performed by: NURSE PRACTITIONER

## 2024-12-04 NOTE — PROGRESS NOTES
Subjective  Patient ID: Colleen Yanes is a 64 y.o.. female seen in regularly scheduled group session.  Group participants have consented to group conducted in person    Total Group Time, Face to Face: 70 minutes  Total Group Participants: 3, plus facilitation per YONATHAN Vargas    Group Therapy  Group topics explored including development of new normal, interpersonal sensitivity, short and long term effects of diagnosis and treatment, anxiety surrounding adjusted treatment plan or adjusted follow up, pain management, physical deconditioning, weight management, social determinants of health (finances, living arrangements, etc), and lifestyle choices. Discussed impact of pain, goals of rehabilitation, and helpful strategies. Considered goals of return to new normal, while noting difficulties processing adjusted life experience in aftermath of cancer. Considered importance of sleep, daytime energy, enjoyable engagement, and benefits of group therapy.    Counseling provided regarding behavioral activation/ activity scheduling, reintroduction to activity through graded tasks, balancing avoidance with approach , sleep hygiene, recognition and allowance of need for rest, pharmacological and non pharmacological management of sleep disturbance, lifestyle counseling, benefits of obtaining appropraite BMI including programs available in Cancer [facility] and community, risks associated with substance use, benefits of exercise and strategies for managing barriers to engagement, allowance of self care, exploration of quality of life goals, survivorship issues, current programming available in community and clinic, anxiety/ mood management , medication education, and existential distress. Explored modifiable risk factors, benefits of physical activity, and opportunities for intentional rehabilitation. Supported ongoing discussion with loved ones regarding experience in aftermath of cancer, identifying ongoing benefits of peer  support, group connection. Reviewed sleep hygiene techniques, supporting routine,    Discussed current programming available in Cancer Center and community.    Benefits of group discussed while also acknowledging the need for 1:1 consultation at times. Members invited to discuss any concerns privately with me following group setting or to schedule a 1:1 visit if needs not being met in group.    Next shared medical visit: January 22 at 3:30 PM in person    Patient response to group: Pt shares openly in group setting, continuing to balance medical complexities and appreciating interactions with other members.    Medications Management  Pt continues in survivorship of dual breast cancers.    CC: Anxiety, insomnia  HPI: Pt is seen in follow up regarding ongoing treatment for breast cancer, not yet able to have surgery but having this scheduled in January.  Does report challenge mixing up bottles of metformin with gabapentin, fortunately figuring this out and reported resolution with adjustment back to 300 mg q hs. Sleep is improved (4-5 hours nightly) with use of gabapentin 300 mg q hs. Has attempted increasing to 600 mg, although with intrusive daytime fatigue. Frustrated with inability to shut off brain at night without this medication. Denies intrusive hot flashes, appx one a week. Daytime energy remains low. Was able to host for Thanksgiving, greatly appreciating 's assistance. Continues to appreciate ativan, using this PRN. Sleep remains most complex challenge.  Has appreciated improvement in RLS with improvement in iron, attributing this to discontinuation of nexium.  Exam: As Above  Current medication regimen: gabapentin 300 mg q hs, ativan 0.5 mg    Lab Review:   Lab on 10/30/2024   Component Date Value    Glucose 10/30/2024 156 (H)     BUN 10/30/2024 17     Creatinine 10/30/2024 0.74     Sodium 10/30/2024 142     Potassium 10/30/2024 4.1     Chloride 10/30/2024 103     CO2 10/30/2024 23.1     Calcium  10/30/2024 9.4     Total Protein 10/30/2024 6.7     Albumin 10/30/2024 4.2     ALT (SGPT) 10/30/2024 22     AST (SGOT) 10/30/2024 18     Alkaline Phosphatase 10/30/2024 73     Total Bilirubin 10/30/2024 0.2     Globulin 10/30/2024 2.5     A/G Ratio 10/30/2024 1.7     BUN/Creatinine Ratio 10/30/2024 23.0     Anion Gap 10/30/2024 15.9 (H)     eGFR 10/30/2024 91.0     WBC 10/30/2024 9.70     RBC 10/30/2024 4.43     Hemoglobin 10/30/2024 11.9 (L)     Hematocrit 10/30/2024 39.8     MCV 10/30/2024 89.8     MCH 10/30/2024 26.9     MCHC 10/30/2024 29.9 (L)     RDW 10/30/2024 13.6     RDW-SD 10/30/2024 44.7     MPV 10/30/2024 8.9     Platelets 10/30/2024 269     Neutrophil % 10/30/2024 48.9     Lymphocyte % 10/30/2024 41.2     Monocyte % 10/30/2024 6.8     Eosinophil % 10/30/2024 2.3     Basophil % 10/30/2024 0.6     Immature Grans % 10/30/2024 0.2     Neutrophils, Absolute 10/30/2024 4.74     Lymphocytes, Absolute 10/30/2024 4.00 (H)     Monocytes, Absolute 10/30/2024 0.66     Eosinophils, Absolute 10/30/2024 0.22     Basophils, Absolute 10/30/2024 0.06     Immature Grans, Absolute 10/30/2024 0.02     nRBC 10/30/2024 0.0      MDM:  Meds reviewed and renewed as appropriate.  Continue gabapentin as written, supporting increase up to 600 mg q hs as tolerated and beneficial to sleep/ wakefulness.   Continue ativan PRN.  FU scheduled in group setting.    Diagnoses and all orders for this visit:    1. Generalized anxiety disorder (Primary)    2. Neuropathy    3. Primary insomnia    4. Malignant neoplasm of overlapping sites of left breast in female, estrogen receptor positive

## 2024-12-11 ENCOUNTER — OFFICE (OUTPATIENT)
Dept: URBAN - METROPOLITAN AREA CLINIC 76 | Facility: CLINIC | Age: 64
End: 2024-12-11

## 2024-12-11 VITALS
OXYGEN SATURATION: 96 % | HEART RATE: 100 BPM | WEIGHT: 187 LBS | HEIGHT: 64 IN | SYSTOLIC BLOOD PRESSURE: 130 MMHG | DIASTOLIC BLOOD PRESSURE: 76 MMHG

## 2024-12-11 DIAGNOSIS — K76.0 FATTY (CHANGE OF) LIVER, NOT ELSEWHERE CLASSIFIED: ICD-10-CM

## 2024-12-11 DIAGNOSIS — K29.70 GASTRITIS, UNSPECIFIED, WITHOUT BLEEDING: ICD-10-CM

## 2024-12-11 DIAGNOSIS — K31.7 POLYP OF STOMACH AND DUODENUM: ICD-10-CM

## 2024-12-11 DIAGNOSIS — C50.919 MALIGNANT NEOPLASM OF UNSPECIFIED SITE OF UNSPECIFIED FEMALE: ICD-10-CM

## 2024-12-11 DIAGNOSIS — K21.9 GASTRO-ESOPHAGEAL REFLUX DISEASE WITHOUT ESOPHAGITIS: ICD-10-CM

## 2024-12-11 PROCEDURE — 99214 OFFICE O/P EST MOD 30 MIN: CPT

## 2024-12-11 RX ORDER — FAMOTIDINE 40 MG/1
TABLET, FILM COATED ORAL
Qty: 30 | Refills: 1 | Status: ACTIVE
Start: 2024-12-11

## 2024-12-13 DIAGNOSIS — F41.1 GENERALIZED ANXIETY DISORDER: ICD-10-CM

## 2024-12-13 DIAGNOSIS — F33.1 MAJOR DEPRESSIVE DISORDER, RECURRENT EPISODE, MODERATE: ICD-10-CM

## 2024-12-13 RX ORDER — VENLAFAXINE HYDROCHLORIDE 150 MG/1
150 CAPSULE, EXTENDED RELEASE ORAL DAILY
Qty: 90 CAPSULE | Refills: 0 | Status: SHIPPED | OUTPATIENT
Start: 2024-12-13 | End: 2025-03-13

## 2024-12-18 ENCOUNTER — HOSPITAL ENCOUNTER (OUTPATIENT)
Facility: HOSPITAL | Age: 64
Discharge: HOME | End: 2024-12-18
Admitting: INTERNAL MEDICINE
Payer: COMMERCIAL

## 2024-12-18 DIAGNOSIS — Z17.0 MALIGNANT NEOPLASM OF OVERLAPPING SITES OF LEFT BREAST IN FEMALE, ESTROGEN RECEPTOR POSITIVE: ICD-10-CM

## 2024-12-18 DIAGNOSIS — C50.812 MALIGNANT NEOPLASM OF OVERLAPPING SITES OF LEFT BREAST IN FEMALE, ESTROGEN RECEPTOR POSITIVE: ICD-10-CM

## 2024-12-18 PROCEDURE — 25510000001 IOPAMIDOL 61 % SOLUTION: Performed by: INTERNAL MEDICINE

## 2024-12-18 PROCEDURE — 71260 CT THORAX DX C+: CPT

## 2024-12-18 RX ORDER — IOPAMIDOL 612 MG/ML
100 INJECTION, SOLUTION INTRAVASCULAR
Status: COMPLETED | OUTPATIENT
Start: 2024-12-18 | End: 2024-12-18

## 2024-12-18 RX ADMIN — IOPAMIDOL 85 ML: 612 INJECTION, SOLUTION INTRAVENOUS at 08:58

## 2025-01-13 ENCOUNTER — HOSPITAL ENCOUNTER (OUTPATIENT)
Age: 65
Setting detail: HOSPITAL OUTPATIENT SURGERY
Discharge: HOME | End: 2025-01-13
Attending: SURGERY | Admitting: SURGERY
Payer: COMMERCIAL

## 2025-01-13 ENCOUNTER — ANESTHESIA EVENT (OUTPATIENT)
Age: 65
End: 2025-01-13
Payer: COMMERCIAL

## 2025-01-13 ENCOUNTER — ANESTHESIA (OUTPATIENT)
Age: 65
End: 2025-01-13
Payer: COMMERCIAL

## 2025-01-13 VITALS
OXYGEN SATURATION: 95 % | HEIGHT: 64 IN | DIASTOLIC BLOOD PRESSURE: 63 MMHG | HEART RATE: 88 BPM | BODY MASS INDEX: 32.17 KG/M2 | TEMPERATURE: 98.1 F | RESPIRATION RATE: 20 BRPM | SYSTOLIC BLOOD PRESSURE: 127 MMHG | WEIGHT: 188.4 LBS

## 2025-01-13 DIAGNOSIS — Z85.3 HISTORY OF BREAST CANCER: ICD-10-CM

## 2025-01-13 LAB
GLUCOSE BLDC GLUCOMTR-MCNC: 145 MG/DL (ref 70–130)
GLUCOSE BLDC GLUCOMTR-MCNC: 184 MG/DL (ref 70–130)

## 2025-01-13 PROCEDURE — 25010000002 DROPERIDOL PER 5 MG: Performed by: ANESTHESIOLOGY

## 2025-01-13 PROCEDURE — 25010000002 FENTANYL CITRATE (PF) 50 MCG/ML SOLUTION: Performed by: ANESTHESIOLOGY

## 2025-01-13 PROCEDURE — 25010000002 HYDROMORPHONE 1 MG/ML SOLUTION: Performed by: ANESTHESIOLOGY

## 2025-01-13 PROCEDURE — 25010000002 LIDOCAINE 2% SOLUTION: Performed by: ANESTHESIOLOGY

## 2025-01-13 PROCEDURE — 25010000002 CEFAZOLIN PER 500 MG: Performed by: SURGERY

## 2025-01-13 PROCEDURE — 25810000003 LACTATED RINGERS PER 1000 ML: Performed by: ANESTHESIOLOGY

## 2025-01-13 PROCEDURE — 25010000002 BUPIVACAINE (PF) 0.25 % SOLUTION 10 ML VIAL: Performed by: SURGERY

## 2025-01-13 PROCEDURE — 25010000002 CEFOXITIN PER 1 G: Performed by: SURGERY

## 2025-01-13 PROCEDURE — 25010000002 BUPIVACAINE LIPOSOME 1.3 % SUSPENSION 20 ML VIAL: Performed by: SURGERY

## 2025-01-13 PROCEDURE — C1789 PROSTHESIS, BREAST, IMP: HCPCS | Performed by: SURGERY

## 2025-01-13 PROCEDURE — 25010000002 DEXAMETHASONE SODIUM PHOSPHATE 20 MG/5ML SOLUTION: Performed by: ANESTHESIOLOGY

## 2025-01-13 PROCEDURE — 11970 RPLCMT TISS XPNDR PERM IMPLT: CPT | Performed by: SURGERY

## 2025-01-13 PROCEDURE — 25010000002 SUGAMMADEX 200 MG/2ML SOLUTION: Performed by: ANESTHESIOLOGY

## 2025-01-13 PROCEDURE — 25010000002 PROPOFOL 10 MG/ML EMULSION: Performed by: ANESTHESIOLOGY

## 2025-01-13 PROCEDURE — 88302 TISSUE EXAM BY PATHOLOGIST: CPT | Performed by: SURGERY

## 2025-01-13 PROCEDURE — 19316 MASTOPEXY: CPT | Performed by: SURGERY

## 2025-01-13 PROCEDURE — 25010000002 GENTAMICIN PER 80 MG: Performed by: SURGERY

## 2025-01-13 DEVICE — IMPLANTABLE DEVICE: Type: IMPLANTABLE DEVICE | Site: BREAST | Status: FUNCTIONAL

## 2025-01-13 DEVICE — DEV CONTRL TISS STRATAFIXSPIRALMNCRYL PLSPS2 REV3/0 45CM: Type: IMPLANTABLE DEVICE | Site: BREAST | Status: FUNCTIONAL

## 2025-01-13 RX ORDER — PROMETHAZINE HYDROCHLORIDE 12.5 MG/1
25 TABLET ORAL ONCE AS NEEDED
Status: DISCONTINUED | OUTPATIENT
Start: 2025-01-13 | End: 2025-01-13 | Stop reason: HOSPADM

## 2025-01-13 RX ORDER — FENTANYL CITRATE 50 UG/ML
INJECTION, SOLUTION INTRAMUSCULAR; INTRAVENOUS AS NEEDED
Status: DISCONTINUED | OUTPATIENT
Start: 2025-01-13 | End: 2025-01-13 | Stop reason: SURG

## 2025-01-13 RX ORDER — OXYCODONE AND ACETAMINOPHEN 7.5; 325 MG/1; MG/1
1 TABLET ORAL EVERY 4 HOURS PRN
Status: DISCONTINUED | OUTPATIENT
Start: 2025-01-13 | End: 2025-01-13 | Stop reason: HOSPADM

## 2025-01-13 RX ORDER — HYDRALAZINE HYDROCHLORIDE 20 MG/ML
5 INJECTION INTRAMUSCULAR; INTRAVENOUS
Status: DISCONTINUED | OUTPATIENT
Start: 2025-01-13 | End: 2025-01-13 | Stop reason: HOSPADM

## 2025-01-13 RX ORDER — ATROPINE SULFATE 0.4 MG/ML
0.4 INJECTION, SOLUTION INTRAMUSCULAR; INTRAVENOUS; SUBCUTANEOUS ONCE AS NEEDED
Status: DISCONTINUED | OUTPATIENT
Start: 2025-01-13 | End: 2025-01-13 | Stop reason: HOSPADM

## 2025-01-13 RX ORDER — HYDROMORPHONE HYDROCHLORIDE 1 MG/ML
0.5 INJECTION, SOLUTION INTRAMUSCULAR; INTRAVENOUS; SUBCUTANEOUS
Status: DISCONTINUED | OUTPATIENT
Start: 2025-01-13 | End: 2025-01-13 | Stop reason: HOSPADM

## 2025-01-13 RX ORDER — PROPOFOL 10 MG/ML
VIAL (ML) INTRAVENOUS AS NEEDED
Status: DISCONTINUED | OUTPATIENT
Start: 2025-01-13 | End: 2025-01-13 | Stop reason: SURG

## 2025-01-13 RX ORDER — ACETAMINOPHEN 500 MG
1000 TABLET ORAL ONCE
Status: COMPLETED | OUTPATIENT
Start: 2025-01-13 | End: 2025-01-13

## 2025-01-13 RX ORDER — ONDANSETRON 2 MG/ML
4 INJECTION INTRAMUSCULAR; INTRAVENOUS ONCE AS NEEDED
Status: DISCONTINUED | OUTPATIENT
Start: 2025-01-13 | End: 2025-01-13 | Stop reason: HOSPADM

## 2025-01-13 RX ORDER — LIDOCAINE HYDROCHLORIDE 20 MG/ML
INJECTION, SOLUTION INFILTRATION; PERINEURAL AS NEEDED
Status: DISCONTINUED | OUTPATIENT
Start: 2025-01-13 | End: 2025-01-13 | Stop reason: SURG

## 2025-01-13 RX ORDER — HYDROMORPHONE HYDROCHLORIDE 1 MG/ML
0.25 INJECTION, SOLUTION INTRAMUSCULAR; INTRAVENOUS; SUBCUTANEOUS
Status: DISCONTINUED | OUTPATIENT
Start: 2025-01-13 | End: 2025-01-13 | Stop reason: HOSPADM

## 2025-01-13 RX ORDER — FLUMAZENIL 0.1 MG/ML
0.2 INJECTION INTRAVENOUS AS NEEDED
Status: DISCONTINUED | OUTPATIENT
Start: 2025-01-13 | End: 2025-01-13 | Stop reason: HOSPADM

## 2025-01-13 RX ORDER — HYDROCODONE BITARTRATE AND ACETAMINOPHEN 5; 325 MG/1; MG/1
1 TABLET ORAL ONCE AS NEEDED
Status: DISCONTINUED | OUTPATIENT
Start: 2025-01-13 | End: 2025-01-13 | Stop reason: HOSPADM

## 2025-01-13 RX ORDER — SODIUM CHLORIDE, SODIUM LACTATE, POTASSIUM CHLORIDE, CALCIUM CHLORIDE 600; 310; 30; 20 MG/100ML; MG/100ML; MG/100ML; MG/100ML
9 INJECTION, SOLUTION INTRAVENOUS CONTINUOUS
Status: DISCONTINUED | OUTPATIENT
Start: 2025-01-13 | End: 2025-01-13 | Stop reason: HOSPADM

## 2025-01-13 RX ORDER — SODIUM CHLORIDE 0.9 % (FLUSH) 0.9 %
3-10 SYRINGE (ML) INJECTION AS NEEDED
Status: DISCONTINUED | OUTPATIENT
Start: 2025-01-13 | End: 2025-01-13 | Stop reason: HOSPADM

## 2025-01-13 RX ORDER — NALOXONE HCL 0.4 MG/ML
0.2 VIAL (ML) INJECTION AS NEEDED
Status: DISCONTINUED | OUTPATIENT
Start: 2025-01-13 | End: 2025-01-13 | Stop reason: HOSPADM

## 2025-01-13 RX ORDER — DROPERIDOL 2.5 MG/ML
0.62 INJECTION, SOLUTION INTRAMUSCULAR; INTRAVENOUS
Status: DISCONTINUED | OUTPATIENT
Start: 2025-01-13 | End: 2025-01-13 | Stop reason: HOSPADM

## 2025-01-13 RX ORDER — LABETALOL HYDROCHLORIDE 5 MG/ML
5 INJECTION, SOLUTION INTRAVENOUS
Status: DISCONTINUED | OUTPATIENT
Start: 2025-01-13 | End: 2025-01-13 | Stop reason: HOSPADM

## 2025-01-13 RX ORDER — EPHEDRINE SULFATE 50 MG/ML
INJECTION INTRAVENOUS AS NEEDED
Status: DISCONTINUED | OUTPATIENT
Start: 2025-01-13 | End: 2025-01-13 | Stop reason: SURG

## 2025-01-13 RX ORDER — HYDROCODONE BITARTRATE AND ACETAMINOPHEN 7.5; 325 MG/1; MG/1
1 TABLET ORAL EVERY 4 HOURS PRN
Status: DISCONTINUED | OUTPATIENT
Start: 2025-01-13 | End: 2025-01-13 | Stop reason: HOSPADM

## 2025-01-13 RX ORDER — LIDOCAINE HYDROCHLORIDE 20 MG/ML
INJECTION, SOLUTION INFILTRATION; PERINEURAL AS NEEDED
Status: DISCONTINUED | OUTPATIENT
Start: 2025-01-13 | End: 2025-01-13

## 2025-01-13 RX ORDER — FENTANYL CITRATE 50 UG/ML
25 INJECTION, SOLUTION INTRAMUSCULAR; INTRAVENOUS
Status: DISCONTINUED | OUTPATIENT
Start: 2025-01-13 | End: 2025-01-13 | Stop reason: HOSPADM

## 2025-01-13 RX ORDER — PROMETHAZINE HYDROCHLORIDE 25 MG/1
25 SUPPOSITORY RECTAL ONCE AS NEEDED
Status: DISCONTINUED | OUTPATIENT
Start: 2025-01-13 | End: 2025-01-13 | Stop reason: HOSPADM

## 2025-01-13 RX ORDER — DIPHENHYDRAMINE HYDROCHLORIDE 50 MG/ML
12.5 INJECTION INTRAMUSCULAR; INTRAVENOUS
Status: DISCONTINUED | OUTPATIENT
Start: 2025-01-13 | End: 2025-01-13 | Stop reason: HOSPADM

## 2025-01-13 RX ORDER — MIDAZOLAM HYDROCHLORIDE 1 MG/ML
1 INJECTION, SOLUTION INTRAMUSCULAR; INTRAVENOUS
Status: DISCONTINUED | OUTPATIENT
Start: 2025-01-13 | End: 2025-01-13 | Stop reason: HOSPADM

## 2025-01-13 RX ORDER — DEXMEDETOMIDINE HYDROCHLORIDE 100 UG/ML
INJECTION, SOLUTION INTRAVENOUS AS NEEDED
Status: DISCONTINUED | OUTPATIENT
Start: 2025-01-13 | End: 2025-01-13 | Stop reason: SURG

## 2025-01-13 RX ORDER — SCOLOPAMINE TRANSDERMAL SYSTEM 1 MG/1
1 PATCH, EXTENDED RELEASE TRANSDERMAL
Status: DISCONTINUED | OUTPATIENT
Start: 2025-01-13 | End: 2025-01-13 | Stop reason: HOSPADM

## 2025-01-13 RX ORDER — FENTANYL CITRATE 50 UG/ML
50 INJECTION, SOLUTION INTRAMUSCULAR; INTRAVENOUS
Status: DISCONTINUED | OUTPATIENT
Start: 2025-01-13 | End: 2025-01-13 | Stop reason: HOSPADM

## 2025-01-13 RX ORDER — SUCCINYLCHOLINE/SOD CL,ISO/PF 200MG/10ML
SYRINGE (ML) INTRAVENOUS AS NEEDED
Status: DISCONTINUED | OUTPATIENT
Start: 2025-01-13 | End: 2025-01-13 | Stop reason: SURG

## 2025-01-13 RX ORDER — ROCURONIUM BROMIDE 10 MG/ML
INJECTION, SOLUTION INTRAVENOUS AS NEEDED
Status: DISCONTINUED | OUTPATIENT
Start: 2025-01-13 | End: 2025-01-13 | Stop reason: SURG

## 2025-01-13 RX ORDER — SODIUM CHLORIDE 0.9 % (FLUSH) 0.9 %
3 SYRINGE (ML) INJECTION EVERY 12 HOURS SCHEDULED
Status: DISCONTINUED | OUTPATIENT
Start: 2025-01-13 | End: 2025-01-13 | Stop reason: HOSPADM

## 2025-01-13 RX ORDER — DEXAMETHASONE SODIUM PHOSPHATE 4 MG/ML
INJECTION, SOLUTION INTRA-ARTICULAR; INTRALESIONAL; INTRAMUSCULAR; INTRAVENOUS; SOFT TISSUE AS NEEDED
Status: DISCONTINUED | OUTPATIENT
Start: 2025-01-13 | End: 2025-01-13 | Stop reason: SURG

## 2025-01-13 RX ORDER — BUPIVACAINE HCL/0.9 % NACL/PF 0.125 %
PLASTIC BAG, INJECTION (ML) EPIDURAL AS NEEDED
Status: DISCONTINUED | OUTPATIENT
Start: 2025-01-13 | End: 2025-01-13 | Stop reason: SURG

## 2025-01-13 RX ADMIN — EPHEDRINE SULFATE 5 MG: 50 INJECTION INTRAVENOUS at 10:01

## 2025-01-13 RX ADMIN — SODIUM CHLORIDE 2000 MG: 900 INJECTION INTRAVENOUS at 07:04

## 2025-01-13 RX ADMIN — Medication 100 MCG: at 09:12

## 2025-01-13 RX ADMIN — LIDOCAINE HYDROCHLORIDE 60 MG: 20 INJECTION, SOLUTION INFILTRATION; PERINEURAL at 08:27

## 2025-01-13 RX ADMIN — ROCURONIUM BROMIDE 10 MG: 10 INJECTION, SOLUTION INTRAVENOUS at 09:05

## 2025-01-13 RX ADMIN — Medication 100 MCG: at 09:40

## 2025-01-13 RX ADMIN — PROPOFOL 50 MCG/KG/MIN: 10 INJECTION, EMULSION INTRAVENOUS at 07:33

## 2025-01-13 RX ADMIN — Medication 100 MCG: at 08:06

## 2025-01-13 RX ADMIN — HYDROMORPHONE HYDROCHLORIDE 0.25 MG: 1 INJECTION, SOLUTION INTRAMUSCULAR; INTRAVENOUS; SUBCUTANEOUS at 08:54

## 2025-01-13 RX ADMIN — EPHEDRINE SULFATE 5 MG: 50 INJECTION INTRAVENOUS at 09:56

## 2025-01-13 RX ADMIN — DEXMEDETOMIDINE 6 MCG: 100 INJECTION, SOLUTION INTRAVENOUS at 08:43

## 2025-01-13 RX ADMIN — SODIUM CHLORIDE, POTASSIUM CHLORIDE, SODIUM LACTATE AND CALCIUM CHLORIDE 9 ML/HR: 600; 310; 30; 20 INJECTION, SOLUTION INTRAVENOUS at 07:04

## 2025-01-13 RX ADMIN — PROPOFOL 30 MG: 10 INJECTION, EMULSION INTRAVENOUS at 07:25

## 2025-01-13 RX ADMIN — DEXAMETHASONE SODIUM PHOSPHATE 4 MG: 4 INJECTION, SOLUTION INTRAMUSCULAR; INTRAVENOUS at 07:42

## 2025-01-13 RX ADMIN — Medication 100 MCG: at 07:54

## 2025-01-13 RX ADMIN — ACETAMINOPHEN 1000 MG: 500 TABLET ORAL at 07:04

## 2025-01-13 RX ADMIN — EPHEDRINE SULFATE 5 MG: 50 INJECTION INTRAVENOUS at 10:10

## 2025-01-13 RX ADMIN — LIDOCAINE HYDROCHLORIDE 60 MG: 20 INJECTION, SOLUTION INFILTRATION; PERINEURAL at 09:12

## 2025-01-13 RX ADMIN — LIDOCAINE HYDROCHLORIDE 20 MG: 20 INJECTION, SOLUTION INFILTRATION; PERINEURAL at 07:30

## 2025-01-13 RX ADMIN — Medication 100 MCG: at 07:39

## 2025-01-13 RX ADMIN — Medication 100 MCG: at 09:27

## 2025-01-13 RX ADMIN — FENTANYL CITRATE 50 MCG: 50 INJECTION, SOLUTION INTRAMUSCULAR; INTRAVENOUS at 07:21

## 2025-01-13 RX ADMIN — LIDOCAINE HYDROCHLORIDE 80 MG: 20 INJECTION, SOLUTION INFILTRATION; PERINEURAL at 07:21

## 2025-01-13 RX ADMIN — HYDROMORPHONE HYDROCHLORIDE 0.25 MG: 1 INJECTION, SOLUTION INTRAMUSCULAR; INTRAVENOUS; SUBCUTANEOUS at 09:04

## 2025-01-13 RX ADMIN — SUGAMMADEX 200 MG: 100 INJECTION, SOLUTION INTRAVENOUS at 10:26

## 2025-01-13 RX ADMIN — PROPOFOL 140 MG: 10 INJECTION, EMULSION INTRAVENOUS at 07:22

## 2025-01-13 RX ADMIN — DROPERIDOL 1.25 MG: 2.5 INJECTION, SOLUTION INTRAMUSCULAR; INTRAVENOUS at 07:18

## 2025-01-13 RX ADMIN — Medication 100 MCG: at 10:01

## 2025-01-13 RX ADMIN — DEXMEDETOMIDINE 8 MCG: 100 INJECTION, SOLUTION INTRAVENOUS at 07:33

## 2025-01-13 RX ADMIN — SCOPOLAMINE 1 PATCH: 1.5 PATCH, EXTENDED RELEASE TRANSDERMAL at 07:04

## 2025-01-13 RX ADMIN — Medication 140 MG: at 07:23

## 2025-01-13 RX ADMIN — ROCURONIUM BROMIDE 50 MG: 10 INJECTION, SOLUTION INTRAVENOUS at 07:30

## 2025-01-13 RX ADMIN — ROCURONIUM BROMIDE 20 MG: 10 INJECTION, SOLUTION INTRAVENOUS at 08:25

## 2025-01-13 RX ADMIN — Medication 100 MCG: at 09:48

## 2025-01-13 RX ADMIN — EPHEDRINE SULFATE 5 MG: 50 INJECTION INTRAVENOUS at 10:22

## 2025-01-13 NOTE — ANESTHESIA PROCEDURE NOTES
Airway  Urgency: elective    Date/Time: 1/13/2025 7:26 AM  End Time:1/13/2025 7:27 AM  Airway not difficult    General Information and Staff    Patient location during procedure: OR  Anesthesiologist: Jignesh Trevino DO    Indications and Patient Condition  Indications for airway management: airway protection    Preoxygenated: yes  MILS maintained throughout  Mask difficulty assessment: 2 - vent by mask + OA or adjuvant +/- NMBA    Final Airway Details  Final airway type: endotracheal airway      Successful airway: ETT  Cuffed: yes   Successful intubation technique: direct laryngoscopy  Facilitating devices/methods: anterior pressure/BURP and OPA  Endotracheal tube insertion site: oral  Blade: Nba  Blade size: 3  ETT size (mm): 7.0  Cormack-Lehane Classification: grade IIb - view of arytenoids or posterior of glottis only  Placement verified by: capnometry   Cuff volume (mL): 8  Measured from: lips  Number of attempts at approach: 1  Assessment: lips, teeth, and gum same as pre-op and atraumatic intubation

## 2025-01-13 NOTE — H&P
Plastic Surgery History and Physical Note    Current Date: 1/13/2025  Name: Colleen Yanes  Age: 64 y.o.  Sex: female  MRN: [id number]  YOB: 1960    ?  Chief Complaint:  left breast cancer  ?  History of Present Illness:  Colleen Yanes is a female 64 y.o. here today for second stage reconstruction with tissue expander to implant exchange on the left and contralateral right mastopexy for symmetry.      Family History:   Family History   Problem Relation Age of Onset    Diabetes Mother     Heart disease Mother     Breast cancer Mother     Coronary artery disease Father     Heart disease Father     Heart attack Father     Diabetes Sister     Heart disease Sister     Coronary artery disease Sister     Squamous cell carcinoma Sister     Heart disease Brother     Diabetes Brother     Coronary artery disease Brother     Coronary artery disease Brother     Heart disease Brother     Diabetes Brother     Ovarian cancer Neg Hx     Uterine cancer Neg Hx     Colon cancer Neg Hx     Malig Hyperthermia Neg Hx      Social History:   Social History     Socioeconomic History    Marital status:      Spouse name: Edwin    Number of children: 3   Tobacco Use    Smoking status: Former     Current packs/day: 1.00     Types: Cigarettes     Passive exposure: Never    Smokeless tobacco: Never    Tobacco comments:     quit age 35   smoked about a pack a week  for  20 years quit 1996   Vaping Use    Vaping status: Never Used   Substance and Sexual Activity    Alcohol use: Yes     Comment: yearly 6    Drug use: Not Currently     Types: Marijuana, Cocaine(coke)     Comment: age 20    Sexual activity: Yes     Birth control/protection: Post-menopausal     Past Medical History:   Past Medical History:   Diagnosis Date    Anxiety     BBB (bundle branch block) 04/25/2024    per EKG on right    Depression     Diabetes mellitus     Emotional disorder 04/2024    recent loss of brother and diagnosis of left breast cancer    Facial  basal cell cancer     using cream    GERD (gastroesophageal reflux disease)     H/O Arrhythmia     Heart palpitations     Hemorrhoid     History of mononucleosis     Hyperlipidemia     Hypertension     Malignant neoplasm of left breast     Restless leg     Sleep apnea     cpap     Past Surgical History:   Past Surgical History:   Procedure Laterality Date    BREAST BIOPSY Left      AND     BREAST CYST ASPIRATION Left     BREAST RECONSTRUCTION Left 2024    Procedure: Left BREAST RECONSTRUCTION-GOLDILOCKS, TISSUE EXPANDER PLACEMENT WITH ACELLULAR DERMAL MATRIX (ADM);  Surgeon: Jaswinder Barbour MD;  Location: [facility];  Service: Plastics;  Laterality: Left;     SECTION      x2    COLONOSCOPY      Negative, Dr. Avelar    DILATATION AND CURETTAGE      ENDOSCOPY      MASTECTOMY W/ SENTINEL NODE BIOPSY Left 2024    Procedure: LEFT BREAST MASTECTOMY WITH SENTINEL NODE BIOPSY;  Surgeon: Riddhi Amezcua MD;  Location: [facility];  Service: General;  Laterality: Left;    US GUIDED CYST ASPIRATION BREAST N/A 2024     Medications:   Current Facility-Administered Medications:     Atropine Sulfate (PF) injection 0.4 mg, 0.4 mg, Intravenous, Once PRN, Gilmar Herrmann MD    ceFAZolin 2000 mg IVPB in 100 mL NS (MBP), 2,000 mg, Intravenous, Once, Jaswinder Barbour MD, 2,000 mg at 25 0704    diphenhydrAMINE (BENADRYL) injection 12.5 mg, 12.5 mg, Intravenous, Q15 Min PRN, Gilmar Herrmann MD    droperidol (INAPSINE) injection 0.625 mg, 0.625 mg, Intravenous, Q20 Min PRN **OR** droperidol (INAPSINE) injection 0.625 mg, 0.625 mg, Intramuscular, Q20 Min PRN, Gilmar Herrmann MD    fentaNYL citrate (PF) (SUBLIMAZE) injection 50 mcg, 50 mcg, Intravenous, Q5 Min PRN, Gilmar Herrmann MD    flumazenil (ROMAZICON) injection 0.2 mg, 0.2 mg, Intravenous, PRN, Gilmar Herrmann MD    hydrALAZINE (APRESOLINE) injection 5 mg, 5 mg, Intravenous, Q10 Min PRN, Gilmar Herrmann MD     HYDROcodone-acetaminophen (NORCO) 5-325 MG per tablet 1 tablet, 1 tablet, Oral, Once PRN, Gilmar Herrmann MD    HYDROmorphone (DILAUDID) injection 0.5 mg, 0.5 mg, Intravenous, Q5 Min PRN, Gilmar Herrmann MD    labetalol (NORMODYNE,TRANDATE) injection 5 mg, 5 mg, Intravenous, Q5 Min PRN, Gilmar Herrmann MD    lactated ringers infusion, 9 mL/hr, Intravenous, Continuous, Gilmar Herrmann MD, Last Rate: 9 mL/hr at 01/13/25 0704, 9 mL/hr at 01/13/25 0704    midazolam (VERSED) injection 1 mg, 1 mg, Intravenous, Q5 Min PRN, Gilmar Herrmann MD    naloxone (NARCAN) injection 0.2 mg, 0.2 mg, Intravenous, PRN, Gilmar Herrmann MD    ondansetron (ZOFRAN) injection 4 mg, 4 mg, Intravenous, Once PRN, Gilmar Herrmann MD    oxyCODONE-acetaminophen (PERCOCET) 7.5-325 MG per tablet 1 tablet, 1 tablet, Oral, Q4H PRN, Gilmar Herrmann MD    promethazine (PHENERGAN) suppository 25 mg, 25 mg, Rectal, Once PRN **OR** promethazine (PHENERGAN) tablet 25 mg, 25 mg, Oral, Once PRN, Gilmar Herrmann MD    scopolamine patch 1 mg/72 hr, 1 patch, Transdermal, Q72H, Jaswinder Barbour MD, 1 patch at 01/13/25 0704    sodium chloride 0.9 % flush 3 mL, 3 mL, Intravenous, Q12H, Gilmar Herrmann MD    sodium chloride 0.9 % flush 3-10 mL, 3-10 mL, Intravenous, PRN, Gilmar Herrmann MD  Allergies:   Allergies   Allergen Reactions    Bactrim [Sulfamethoxazole-Trimethoprim] Rash    Sulfa Antibiotics Itching and Rash    Lisinopril Cough    Simvastatin Myalgia     zocor    Ticagrelor Rash     ?  Review of Systems:   Constitutional: Negative for fevers/chills  H&N: Negative for sore throat/rhinorrhea.  Eyes: Negative for dry eye/visual disturbances.  Respiratory: Negative for cough or dyspnea.    Cardiovascular: Negative for cyanosis or chest pain.   Gastrointestinal: Negative for nausea/emesis.  Genitourinary: Negative for frequency/dysuria.   Musculoskeletal: Negative for muscle or joint pain.  Skin: Negative for new rash/lesions.  Endocrine: Negative for excessive  "thirst/feeling cold.  Hematology: Negative for easy bruising/bleeding.  Neurological: Negative for headaches/tinnitus.    Physical Examination:   /60 (BP Location: Right arm, Patient Position: Lying)   Pulse 70   Temp 97.6 °F (36.4 °C) (Infrared)   Resp 18   Ht 162.6 cm (64\")   Wt 85.5 kg (188 lb 6.4 oz)   SpO2 100%   BMI 32.34 kg/m²   Body mass index is 32.34 kg/m².     General: Well nourished, well developed, in no acute distress  Eyes: PERRLA, EOM intact, sclerae anicteric, no conjunctival injection  HENT: Normocephalic, atraumatic, mucous membranes moist  Neck: Supple, no thyromegaly, no lymphadenopathy, trachea midline  Respiratory: Negative for wheezing or stridor, non-labored respirations   Cardiovascular: Normal rate, negative for cyannosis, negative for peripheral edema  Abdominal: Soft, non-distended.  Musculoskeletal: Warm and dry extremities. Negative for motor deficits. Able to stand from seated position easily.  Psychiatric: Appropriate affect, cooperative demeanor.  Neurologic: Oriented x 3, negative for gross motor or sensory deficits.  Skin: No rashes or open wounds.  Breast: left with TE in place, right with grade 3 ptosis        Labs:  Lab Results   Component Value Date    WBC 9.70 10/30/2024    HGB 11.9 (L) 10/30/2024    HCT 39.8 10/30/2024    MCV 89.8 10/30/2024     10/30/2024     Lab Results   Component Value Date    GLUCOSE 156 (H) 10/30/2024    BUN 17 10/30/2024    CREATININE 0.74 10/30/2024    EGFRIFAFRI >60 01/04/2023    BCR 23.0 10/30/2024    K 4.1 10/30/2024    CO2 23.1 10/30/2024    CALCIUM 9.4 10/30/2024    ALBUMIN 4.2 10/30/2024    LABIL2 1.7 01/04/2023    AST 18 10/30/2024    ALT 22 10/30/2024     Lab Results   Component Value Date    INR 1.0 07/08/2024    PROTIME 11.3 07/08/2024     Lab Results   Component Value Date    PTT 30.4 07/08/2024     Lab Results   Component Value Date    HGBA1C 8.80 (H) 04/16/2024     No results found for: \"ABORH\"      Recent Imaging: "   No radiology results for the last day         Assessment and Plan: Colleen Yanes is a 64 y.o. female here today for Left TE to implant exchange and contralateral (right) mastopexy.    -Patient was seen and marked and examined in pre-op holding.  -No changes in patient condition.  -ERAS started preop  -has been off ASA for 1 week    Procedure details, risks, and benefits were discussed with the patient and informed consent discussion was performed.  Questions answered to the patient's satisfaction.      Jaswinder Barbour MD  01/13/25  07:12 EST

## 2025-01-13 NOTE — ANESTHESIA PREPROCEDURE EVALUATION
Anesthesia Evaluation     Patient summary reviewed and Nursing notes reviewed   NPO Solid Status: > 6 hours  NPO Liquid Status: > 2 hours           Airway   Mallampati: III  TM distance: >3 FB  Neck ROM: full  Dental - normal exam     Pulmonary    (+) a smoker Former,sleep apnea on CPAP  (-) COPD, asthma  Cardiovascular   Exercise tolerance: good (4-7 METS)    ECG reviewed    (+) hypertension, CAD, cardiac stents within the past 12 months , hyperlipidemia  (-) past MI, dysrhythmias, angina    ROS comment: PCI 7/2024. Cleared by cardiologist to hold effient. Denies CP, Ascencio.     RBBB on EKG, Qtc 500.    Neuro/Psych  (+) psychiatric history Anxiety and Depression  (-) seizures, CVA  GI/Hepatic/Renal/Endo    (+) GERD well controlled, diabetes mellitus (mounjaro held x2 wks) type 2  (-) liver disease, no renal disease, no thyroid disorder    Musculoskeletal     Abdominal    Substance History      OB/GYN          Other                    Anesthesia Plan    ASA 3     general       Anesthetic plan, risks, benefits, and alternatives have been provided, discussed and informed consent has been obtained with: patient.    CODE STATUS:

## 2025-01-13 NOTE — BRIEF OP NOTE
Brief Op Note    Colleen Yanes  1/13/2025    Pre-op Diagnosis:   History of left breast cancer, asymmetry of reconstructed breasts       Post-Op Diagnosis Codes:   unchanged      Procedure(s):  LEFT-TISSUE EXPANDER TO BREAST IMPLANT EXCHANGE  RIGHT -MASTOPEXY      Surgeon(s):  Jaswinder Barbour MD    Anesthesia: General    Staff:   Circulator: Allegra Moore RN; Teresa Mann RN  Scrub Person: Mariana Welsh RN       Estimated Blood Loss: minimal    Urine Voided: 125 mL    Specimens:    Right breast weight: 96g              Specimens       ID Source Type Tests Collected By Collected At Frozen?    A Breast, Left Tissue TISSUE PATHOLOGY EXAM   Jaswinder Barbour MD 1/13/25 0758 No    Description: LEFT BREAST TISSUE    B Breast, Right Tissue TISSUE PATHOLOGY EXAM   Jaswinder Barbour MD 1/13/25 0904 No    Description: RIGHT BREAST TISSUE           Implant: natrelle inspira smooth round moderate 445cc,   REF: SCM 445cc; SN: [id number]    Drains: none    Findings: good overall size symmetry at conclusion, Right NAC pink and viable    Complications: none    Jaswinder Barbour MD     Date: 1/13/2025  Time: 10:36 EST

## 2025-01-13 NOTE — OP NOTE
Op Note     Colleen Yanes  1/13/2025     Pre-op Diagnosis:   History of left breast cancer, asymmetry of reconstructed breasts       Post-Op Diagnosis Codes:   unchanged       Procedure(s):  LEFT-TISSUE EXPANDER TO BREAST IMPLANT EXCHANGE  RIGHT -MASTOPEXY       Surgeon(s): Jaswinder Barbour MD  Assistant: YONATHAN Bueno FA     Anesthesia: General and local with exparel (20cc exparel, 20cc marcaine plain) and 10cc lidocaine to left breast mastectomy scar     Staff:   Circulator: Allegra Moore RN; Teresa Mann RN  Scrub Person: Mariana Welsh RN     Indications:   Colleen Yanes is a 64 y.o. female here today for breast reconstruction.  She has a history of left breast cabcer for which she underwent a left breast mastectomy and reconstruction with a tissue expander placed back in April of last year.  Today's plan is for expander to implant exchange on the left breast as well as a contralateral right mastopexy/reduction for symmetry. The risks, benefits, and alternatives of the procedure were discussed in detail with the patient including but not limited to pain, bleeding, infection, damage to surrounding structures, potential need for future surgery, scarring, asymmetry, poor wound healing.  We also discussed the risk of capsular contracture and the fact that implants are not lifelong devices.  The FDA checklist was discussed in clinic and signed.  She understands the risks and elects to proceed with procedure.     Procedure:   The patient was met and marked in the upright and standing position in the preoperative holding area.  The risks were again discussed with the patient and all questions were answered.       She was taken to the operating room and laid supine on well-padded operating room table with the arm slightly abducted slightly less than 90 degrees.  Left breast incision was planned over the previous vertical incision and the scar was excised, right breast incisions were planned and a Wise  pattern with a 7 mm vertical limb.  Incisions were anesthetized with 1% lidocaine.      The patient's chest was then prepped with ChloraPrep and draped in the usual sterile fashion. I began on the left breast.  The previous vertical incision was incised and the scar was excised and sent for pathology.  The ADM was found to be mostly well incorporated. The tissue expander was removed and a saline sizer was placed and inflated to 445cc which was an adequate size for her chest and pocket for reconstruction.The pocket was copiously irrigated with double antibiotic solution (ancef and gent) followed by dilute betadine irrigation and hemostasis was obtained.  The permanent implant was then placed using a no touch technique and a Hawkins funnel after I had changed my gloves and again cleansed the breast with betadine.  I then closed the incision in layers with 2-0 PDS deep to close the capsule, followed by 3-0 Monocryl in the deep dermis followed by 4-0 Monocryl in the skin running subcuticular.       I then turned my attention to the right breast and performed the mastopexy with Kellogg pattern with 7 cm limbs. 42mm cookie cutter was used to incise the nipple. I then made incisions with #15 blade. The inferior pedicle was then de-epithelialized. Superior breast flaps were made to redrape over the pedicle this was performed with bovie electrocautery to the level required. I performed intercostal blocks with the exparel mixture bilaterally as well as field block on the right breast. I performed irrigation of the right breast and obtained hemostasis with bovie electrocautery. I then redraped the breast flaps down over the pedicle and tailor tacked closed with staples. I placed a 3-0 PDS at the triple point in the dermis to approximate.      The patient was set up to assess for size and symmetry of which was deemed to be adequate. I also determined the new location of the nipple on the right side which was found to be most  appropriate 6cm from the IMF, this was marked with a 42mm cookie cutter and she was then returned to the supine position for closure.      The incisions were then closed in layers with 3-0 Monocryl in  the deep dermis throughout. The skin was then closed with a 3-0 monocryl stratafix along the IMF and vertical incision and a 4-0 Monocryl running subcuticular suture around the NAC.       The patient's chest was then cleansed and dried. Exofin and Steri-Strips were placed over the incisions.  Fluffed Kerlix and an ACE wrap was applied to the patient's chest.     There were no complications all surgical counts were correct at the conclusion of the case.  Patient tolerated the procedure well and she was taken to the recovery room in stable condition.     Estimated Blood Loss: minimal     Urine Voided: 125 mL     Specimens:    Right breast weight: 96g              Specimens         ID Source Type Tests Collected By Collected At Frozen?     A Breast, Left Tissue TISSUE PATHOLOGY EXAM    Jaswinder Barbour MD 1/13/25 0758 No     Description: LEFT BREAST TISSUE     B Breast, Right Tissue TISSUE PATHOLOGY EXAM    Jaswinder Barbour MD 1/13/25 0904 No     Description: RIGHT BREAST TISSUE             Implant:   Natrelle inspira smooth round moderate 445cc,   REF: SCM 445cc; SN: [id number]     Drains: none     Findings: good overall size symmetry at conclusion, Right NAC pink and viable     Complications: none     Jaswinder Barbour MD      Date: 1/13/2025  Time: 10:36 EST

## 2025-01-13 NOTE — ANESTHESIA POSTPROCEDURE EVALUATION
Patient: Colleen Yanes    Procedure Summary       Date: 01/13/25 Room / Location: [facility] OR 02 / [facility] MAIN OR    Anesthesia Start: 0715 Anesthesia Stop: 1047    Procedures:       LEFT-TISSUE EXPANDER TO BREAST IMPLANT EXCHANGE (Left: Breast)      RIGHT -MASTOPEXY (Right: Breast) Diagnosis:     Surgeons: Jaswinder Barbour MD Provider: Jignesh Trevino DO    Anesthesia Type: general ASA Status: 3            Anesthesia Type: general    Vitals  Vitals Value Taken Time   /60 01/13/25 1116   Temp 36.7 °C (98.1 °F) 01/13/25 1115   Pulse 86 01/13/25 1128   Resp 16 01/13/25 1115   SpO2 92 % 01/13/25 1128   Vitals shown include unfiled device data.        Post Anesthesia Care and Evaluation    Patient location during evaluation: PHASE II  Patient participation: complete - patient participated  Level of consciousness: awake  Pain management: satisfactory to patient    Airway patency: patent  Anesthetic complications: No anesthetic complications  PONV Status: controlled  Cardiovascular status: acceptable  Respiratory status: acceptable  Hydration status: acceptable

## 2025-01-14 LAB
CYTO UR: NORMAL
LAB AP CASE REPORT: NORMAL
PATH REPORT.FINAL DX SPEC: NORMAL
PATH REPORT.GROSS SPEC: NORMAL

## 2025-01-21 DIAGNOSIS — R91.8 ABNORMAL CT SCAN OF LUNG: Primary | ICD-10-CM

## 2025-01-22 ENCOUNTER — OFFICE VISIT (OUTPATIENT)
Dept: PSYCHIATRY | Facility: HOSPITAL | Age: 65
End: 2025-01-22
Payer: COMMERCIAL

## 2025-01-22 DIAGNOSIS — F41.1 GENERALIZED ANXIETY DISORDER: ICD-10-CM

## 2025-01-22 DIAGNOSIS — F33.1 MAJOR DEPRESSIVE DISORDER, RECURRENT EPISODE, MODERATE: ICD-10-CM

## 2025-01-22 RX ORDER — LORAZEPAM 0.5 MG/1
TABLET ORAL
Qty: 60 TABLET | Refills: 0 | Status: SHIPPED | OUTPATIENT
Start: 2025-01-22

## 2025-01-22 RX ORDER — VENLAFAXINE HYDROCHLORIDE 150 MG/1
150 CAPSULE, EXTENDED RELEASE ORAL DAILY
Qty: 90 CAPSULE | Refills: 0 | Status: SHIPPED | OUTPATIENT
Start: 2025-01-22 | End: 2025-04-22

## 2025-01-22 NOTE — PROGRESS NOTES
Subjective  Patient ID: Colleen Yanes is a 64 y.o.. female seen in regularly scheduled group session.  Group participants have consented to group conducted in person    Total Group Time, Face to Face: 75 minutes  Total Group Participants: 6, plus facilitation per YONATHAN Vargas    Group Therapy  Group topics explored including development of new normal, interpersonal sensitivity, short and long term effects of diagnosis and treatment, significant other or family conflict, anticipitory anxiety, anxiety surrounding adjusted treatment plan or adjusted follow up, pain management, physical deconditioning, and lifestyle choices. Shares goals of gratitude, appreciated perspective, radical acceptance. Shared interpersonal stressors, love for children, need for time to process, unwavering support. Considered personal self care needs amidst role strain.     Counseling provided regarding cognitive behavioral therapy (CBT) strategies, behavioral activation/ activity scheduling, balancing avoidance with approach , recognition and allowance of need for rest, pharmacological and non pharmacological management of sleep disturbance, lifestyle counseling, benefits of exercise and strategies for managing barriers to engagement, allowance of self care, exploration of quality of life goals, survivorship issues, anxiety/ mood management , medication education, and existential distress. Considered shared experiences in group conversation, safe opportunity to discuss emotions and share information. Supported self care strategies, reinforcing importance of boundaries and limitations, specifically in the midst of changing family dynamics.    Discussed current programming available in [facility] and community.    Benefits of group discussed while also acknowledging the need for 1:1 consultation at times. Members invited to discuss any concerns privately with me following group setting or to schedule a 1:1 visit if needs not being met  in group.    Next shared medical visit: March 12 at 3:30 PM    Patient response to group: Pt shares openly in group setting.    Medications Management  Pt continues in survivorship of breast cancer.    CC: Anxiety, demoralization surrounding persistent medical complexity  HPI: Pt is seen in follow up regarding sx of anxiety, appreciating recent final reconstruction, noting increased softness, appropriate healing. Does report some dissonance regarding cosmetic outcome, hoping swelling resolves over time. Does report recent fall on steps prior to surgery, remaining sore. Current pain manageable due to acute care from surgery, curious what remains underlying. Appreciates current rest. Preparing to talk to PCP. Preparing to return to work on Monday. Mood and anxiety ermain stable, appreciating benefit of current medication sleep, sx mgmt.  Exam: As Above  Current medication regimen: venlafaxine  mg daily, gabapentin 300 mg tid, ativan 0.5   Lab Review:   Admission on 01/13/2025, Discharged on 01/13/2025   Component Date Value    Glucose 01/13/2025 145 (A)     Case Report 01/13/2025                      Value:Surgical Pathology Report                         Case: [id number]                                  Authorizing Provider:  Jaswinder Barbour MD   Collected:           01/13/2025 07:58 AM          Ordering Location:     [facility]     Received:            01/13/2025 10:55 AM                                 [facility]                                                                            OR                                                                           Pathologist:           Sayda Jasmine MD                                                        Specimens:   1) - Breast, Left, LEFT BREAST TISSUE                                                               2) - Breast, Right, RIGHT BREAST TISSUE                                                    Final Diagnosis  "01/13/2025                      Value:1.   Skin of left breast, excision:         A.  Benign skin and subcutis.    2.  Right breast, mastopexy:         A.  Benign breast parenchyma and skin.      Gross Description 01/13/2025                      Value:1. Breast, Left.  Received in formalin labeled \"left breast tissue\" 7.5 x 0.3 cm strip of tan-white skin excised to a depth of 0.2 cm.  The specimen is submitted in toto as 1A.    Time collected-758  Cold ischemia time-2 minutes  Total formalin fixation time-13 hours    jap/uso/vas    2. Breast, Right.  Received in formalin labeled \"right breast tissue\" is a 100 g, 12 x 8 x 5 cm aggregate of fibrofatty tissue partially surfaced by tan-white skin.  Sectioning reveals the tissues comprised of 5% fibrous tissue.  Representative sections are submitted as 2A.    Time collected-904  Cold ischemia time-0 minutes  Total formalin fixation time-12 hours    jap/uso/vas        Microscopic Description 01/13/2025                      Value:Unless \"gross only\" is specified, the final diagnosis for each specimen is based on microscopic examination of tissue.      Glucose 01/13/2025 184 (A)      MDM:  Meds reviewed and renewed as appropriate.  Continue medications as written.  FU scheduled in group setting.    Diagnoses and all orders for this visit:    1. Generalized anxiety disorder  -     LORazepam (Ativan) 0.5 MG tablet; Take 1/2 to 1 tablet by mouth up to twice daily as needed for anxiety  Dispense: 60 tablet; Refill: 0  -     venlafaxine XR (Effexor XR) 150 MG 24 hr capsule; Take 1 capsule by mouth Daily for 90 days.  Dispense: 90 capsule; Refill: 0    2. Major depressive disorder, recurrent episode, moderate  -     venlafaxine XR (Effexor XR) 150 MG 24 hr capsule; Take 1 capsule by mouth Daily for 90 days.  Dispense: 90 capsule; Refill: 0                        "

## 2025-02-24 ENCOUNTER — TRANSCRIBE ORDERS (OUTPATIENT)
Dept: SLEEP MEDICINE | Facility: HOSPITAL | Age: 65
End: 2025-02-24
Payer: COMMERCIAL

## 2025-02-24 DIAGNOSIS — G47.33 OSA (OBSTRUCTIVE SLEEP APNEA): Primary | ICD-10-CM

## 2025-03-12 ENCOUNTER — OFFICE VISIT (OUTPATIENT)
Dept: PSYCHIATRY | Facility: HOSPITAL | Age: 65
End: 2025-03-12
Payer: COMMERCIAL

## 2025-03-12 ENCOUNTER — OFFICE VISIT (OUTPATIENT)
Dept: OBSTETRICS AND GYNECOLOGY | Facility: CLINIC | Age: 65
End: 2025-03-12
Payer: COMMERCIAL

## 2025-03-12 VITALS — DIASTOLIC BLOOD PRESSURE: 74 MMHG | SYSTOLIC BLOOD PRESSURE: 106 MMHG | BODY MASS INDEX: 31.65 KG/M2 | WEIGHT: 184.4 LBS

## 2025-03-12 DIAGNOSIS — R93.89 ABNORMAL FINDING ON CT SCAN: ICD-10-CM

## 2025-03-12 DIAGNOSIS — Z78.0 POSTMENOPAUSAL STATUS: ICD-10-CM

## 2025-03-12 DIAGNOSIS — Z01.419 ENCOUNTER FOR WELL WOMAN EXAM: Primary | ICD-10-CM

## 2025-03-12 DIAGNOSIS — F33.1 MAJOR DEPRESSIVE DISORDER, RECURRENT EPISODE, MODERATE: ICD-10-CM

## 2025-03-12 DIAGNOSIS — Z85.3 HISTORY OF LEFT BREAST CANCER: ICD-10-CM

## 2025-03-12 DIAGNOSIS — N90.4 LICHEN SCLEROSUS OF FEMALE GENITALIA: ICD-10-CM

## 2025-03-12 DIAGNOSIS — Z12.4 CERVICAL CANCER SCREENING: ICD-10-CM

## 2025-03-12 DIAGNOSIS — F41.1 GENERALIZED ANXIETY DISORDER: ICD-10-CM

## 2025-03-12 PROCEDURE — 99396 PREV VISIT EST AGE 40-64: CPT | Performed by: STUDENT IN AN ORGANIZED HEALTH CARE EDUCATION/TRAINING PROGRAM

## 2025-03-12 RX ORDER — VENLAFAXINE HYDROCHLORIDE 150 MG/1
150 CAPSULE, EXTENDED RELEASE ORAL DAILY
Qty: 90 CAPSULE | Refills: 0 | Status: SHIPPED | OUTPATIENT
Start: 2025-03-12 | End: 2025-03-21

## 2025-03-12 RX ORDER — CLOBETASOL PROPIONATE 0.5 MG/G
1 CREAM TOPICAL 2 TIMES WEEKLY
Qty: 60 G | Refills: 6 | Status: SHIPPED | OUTPATIENT
Start: 2025-03-13

## 2025-03-12 RX ORDER — GABAPENTIN 600 MG/1
600 TABLET ORAL DAILY
Qty: 90 TABLET | Refills: 0 | Status: SHIPPED | OUTPATIENT
Start: 2025-03-12 | End: 2026-03-12

## 2025-03-12 NOTE — PROGRESS NOTES
GYN Annual Exam     CC- Here for annual exam.     Colleen Yanes is a 64 y.o. postmenopausal female who presents for annual well woman exam. She reports doing well today. Denies vaginal bleeding or vasomotor symptoms.  She has history of lichen sclerosus and reports flares up every now and then which she uses Clobetasol cream and A&D ointment.   She was diagnosed with 2 different breasts cancers at the same time and was treated with left breast mastectomy and sentinel lymph node biopsy with 2 separate cancers noted with lobular carcinoma and ductal carcinoma with negative margins and 1 sentinel lymph node negative for metastatic disease. She is now on Arimidex. She has since underwent reconstruction of the left breast with implant and had a right mastopexy for symmetry in January. Recommended mammogram in 4-6 months following surgery to allow for healing instead of this month.   Right bundle  to cardiac catheterization and had stents placed.   She has struggled with the death of her brother last year, around the time of her diagnosis.   She is also concerned regarding a left axillary lymph node measuring 7 mm that was noted on CT chest with contrast in 2024 given that this was the side of her breast cancer.     OB History          3    Para   3    Term   3            AB        Living             SAB        IAB        Ectopic        Molar        Multiple        Live Births                  She was on birth control pill until her mid 50s then switched to hormone patch but did not like how she felt with use.   Current contraception: post menopausal status  History of abnormal Pap smear:  yes - reports positive testing for HPV  and underwent colposcopy with benign biopsy in the past.   Family history of uterine, colon or ovarian cancer: no  History of abnormal mammogram: yes - please see above regarding breast cancer diagnosis last year.   Family history of breast cancer: yes - mother  diagnosed with breast cancer at age 80.  Last Pap :3/6/24- NILM   Last Completed Pap Smear            Upcoming       PAP SMEAR (Every 3 Years) Next due on 3/6/2027      03/06/2024  IGP, Rfx Aptima HPV ASCU    01/05/2022  IGP, Apt HPV,rfx 16 / 18,45    01/23/2019  Pap IG, Rfx HPV ASCU    10/25/2017  IGP,rfx Aptima HPV All Pth - ThinPrep Vial, Cervix                           Last mammogram: 3/20/24- see above for breast cancer diagnosis   Last Completed Mammogram            Upcoming       MAMMOGRAM (Every 2 Years) Next due on 3/20/2026      11/06/2024  Order placed for Mammo Screening Modified With Tomosynthesis Right With CAD by Riddhi Amezcua MD    03/20/2024  Mammo Diagnostic Digital Tomosynthesis Left With CAD    03/06/2024  Mammo Screening Digital Tomosynthesis Bilateral With CAD    01/05/2022  Mammo Screening Digital Tomosynthesis Bilateral With CAD    10/28/2020  Mammo Diagnostic Left With CAD     Only the first 5 history entries have been loaded, but more history exists.                         Last colonoscopy:  2/26/18- colonoscopy- recommended repeat in 2028   Last Completed Colonoscopy            Upcoming       COLORECTAL CANCER SCREENING (COLONOSCOPY - Every 10 Years) Next due on 2/26/2028 02/26/2018  Level of Service: VT COLONOSCOPY W/BIOPSY SINGLE/MULTIPLE                           Last DEXA: 6/6/24- normal bone density scan   Parental Hip Fracture: denies     Past Medical History:   Diagnosis Date    Anxiety     BBB (bundle branch block) 04/25/2024    per EKG on right    Depression     Diabetes mellitus     Emotional disorder 04/2024    recent loss of brother and diagnosis of left breast cancer    Facial basal cell cancer     using cream    GERD (gastroesophageal reflux disease)     H/O Arrhythmia     Heart palpitations     Hemorrhoid     History of mononucleosis     Hyperlipidemia     Hypertension     Malignant neoplasm of left breast 2024    Non-insulin dependent type 2 diabetes mellitus 2011     Restless leg     Sleep apnea     cpap    Varicella        Past Surgical History:   Procedure Laterality Date    BREAST BIOPSY Left      AND     BREAST CYST ASPIRATION Left     BREAST RECONSTRUCTION Left 2024    Procedure: Left BREAST RECONSTRUCTION-GOLDILOCKS, TISSUE EXPANDER PLACEMENT WITH ACELLULAR DERMAL MATRIX (ADM);  Surgeon: Jaswinder Barbour MD;  Location: [facility] MAIN OR;  Service: Plastics;  Laterality: Left;    BREAST TISSUE EXPANDER REMOVAL INSERTION OF IMPLANT Left 2025    Procedure: LEFT-TISSUE EXPANDER TO BREAST IMPLANT EXCHANGE;  Surgeon: Jaswinder Barbour MD;  Location: [facility] MAIN OR;  Service: Plastics;  Laterality: Left;     SECTION      x2    COLONOSCOPY      Negative, Dr. Avelar    DILATATION AND CURETTAGE      ENDOSCOPY      MASTECTOMY W/ SENTINEL NODE BIOPSY Left 2024    Procedure: LEFT BREAST MASTECTOMY WITH SENTINEL NODE BIOPSY;  Surgeon: Riddhi Amezcua MD;  Location: [facility] MAIN OR;  Service: General;  Laterality: Left;    MASTOPEXY Right 2025    Procedure: RIGHT -MASTOPEXY;  Surgeon: Jaswinder Barbour MD;  Location: [facility] MAIN OR;  Service: Plastics;  Laterality: Right;    US GUIDED CYST ASPIRATION BREAST N/A 2024    WISDOM TOOTH EXTRACTION           Current Outpatient Medications:     Accu-Chek Radha Plus test strip, 1 each by Other route Daily., Disp: , Rfl:     anastrozole (Arimidex) 1 MG tablet, Take 1 tablet by mouth Daily for 360 days., Disp: 90 tablet, Rfl: 3    clobetasol (TEMOVATE) 0.05 % ointment, Apply 1 application topically to the appropriate area as directed 2 (Two) Times a Week., Disp: 60 g, Rfl: 2    gabapentin (Neurontin) 100 MG capsule, Take 1 capsule by mouth 3 (Three) Times a Day., Disp: 90 capsule, Rfl: 2    gabapentin (Neurontin) 300 MG capsule, Take 1 capsule by mouth Take As Directed. 1 capsule PO q evening meal, 1 capsule po q hs, Disp: 60 capsule, Rfl: 2    Jardiance 25 MG tablet tablet, Take 1  tablet by mouth After Lunch., Disp: , Rfl:     LORazepam (Ativan) 0.5 MG tablet, Take 1/2 to 1 tablet by mouth up to twice daily as needed for anxiety, Disp: 60 tablet, Rfl: 0    losartan (COZAAR) 25 MG tablet, Take 1 tablet by mouth After Lunch., Disp: , Rfl: 1    metFORMIN ER (GLUCOPHAGE-XR) 500 MG 24 hr tablet, Take 2 tablets by mouth 2 (Two) Times a Day., Disp: , Rfl: 3    metoprolol succinate XL (TOPROL-XL) 25 MG 24 hr tablet, Take 1 tablet by mouth After Lunch., Disp: , Rfl: 1    pioglitazone (ACTOS) 15 MG tablet, Take 1 tablet by mouth Daily., Disp: , Rfl:     pramipexole (MIRAPEX) 0.5 MG tablet, Take 1 tablet by mouth Every Night., Disp: , Rfl:     rosuvastatin (CRESTOR) 40 MG tablet, Take 1 tablet by mouth After Lunch., Disp: , Rfl:     Tirzepatide (MOUNJARO SC), Inject 10 mg under the skin into the appropriate area as directed Every 7 (Seven) Days., Disp: , Rfl:     venlafaxine XR (Effexor XR) 150 MG 24 hr capsule, Take 1 capsule by mouth Daily for 90 days., Disp: 90 capsule, Rfl: 0    vitamin D3 (vitamin d) 125 MCG (5000 UT) capsule capsule, Take 1 capsule by mouth Daily., Disp: , Rfl:     aspirin 81 MG EC tablet, Take 1 tablet by mouth Daily., Disp: , Rfl:     naproxen sodium (ALEVE) 220 MG tablet, Take 3 tablets by mouth 2 (Two) Times a Day As Needed for Mild Pain, Fever or Headache., Disp: , Rfl:     prasugrel (EFFIENT) 10 MG tablet, Take 1 tablet by mouth Daily., Disp: , Rfl:     Allergies   Allergen Reactions    Bactrim [Sulfamethoxazole-Trimethoprim] Rash    Sulfa Antibiotics Itching and Rash    Lisinopril Cough    Simvastatin Myalgia     zocor    Ticagrelor Rash       Social History     Tobacco Use    Smoking status: Former     Current packs/day: 1.00     Types: Cigarettes     Passive exposure: Never    Smokeless tobacco: Never    Tobacco comments:     quit age 35   smoked about a pack a week  for  20 years quit 1996   Vaping Use    Vaping status: Never Used   Substance Use Topics    Alcohol use:  Yes     Comment: yearly 6    Drug use: Not Currently     Types: Marijuana, Cocaine(coke)     Comment: age 20       Family History   Problem Relation Age of Onset    Diabetes Mother     Heart disease Mother     Breast cancer Mother     Hearing loss Mother     Coronary artery disease Father     Heart disease Father     Heart attack Father     Diabetes Sister     Heart disease Sister     Coronary artery disease Sister     Squamous cell carcinoma Sister     Heart disease Brother     Diabetes Brother     Coronary artery disease Brother     Coronary artery disease Brother     Heart disease Brother     Diabetes Brother     Hearing loss Sister     Hyperlipidemia Sister     Skin cancer Sister     Diabetes Sister     Diabetes Brother     Heart attack Brother     Diabetes Brother     Heart failure Brother         Passed 3/9/24    Heart failure Brother     Ovarian cancer Neg Hx     Uterine cancer Neg Hx     Colon cancer Neg Hx     Malig Hyperthermia Neg Hx        Review of Systems   All other systems reviewed and are negative.      Wt 83.6 kg (184 lb 6.4 oz)   BMI 31.65 kg/m²     Physical Exam  Vitals reviewed. Exam conducted with a chaperone present.   Constitutional:       General: She is not in acute distress.  HENT:      Head: Normocephalic and atraumatic.      Right Ear: External ear normal.      Left Ear: External ear normal.   Eyes:      Extraocular Movements: Extraocular movements intact.      Pupils: Pupils are equal, round, and reactive to light.   Cardiovascular:      Rate and Rhythm: Normal rate.   Pulmonary:      Effort: Pulmonary effort is normal. No respiratory distress.   Chest:   Breasts:     Right: No swelling, bleeding, inverted nipple, mass, nipple discharge, skin change or tenderness.      Left: No swelling, bleeding, inverted nipple, mass, nipple discharge, skin change or tenderness.      Comments: Right mastopexy scars present.  Left breast implant in place with mastectomy scar present.   Abdominal:       General: There is no distension.      Palpations: Abdomen is soft.      Tenderness: There is no abdominal tenderness. There is no guarding or rebound.   Genitourinary:     General: Normal vulva.      Exam position: Lithotomy position.      Labia:         Right: No rash, tenderness, lesion or injury.         Left: No rash, tenderness, lesion or injury.       Urethra: No prolapse or urethral swelling.      Vagina: Prolapsed vaginal walls present. No vaginal discharge, erythema, tenderness, bleeding or lesions.      Cervix: Normal.      Uterus: Not enlarged, not fixed and not tender.       Adnexa:         Right: No mass, tenderness or fullness.          Left: No mass, tenderness or fullness.        Comments: Labial agglutination of the labia minora to the labia majora.   Moderate vulvovaginal atrophy present.  Mild anterior wall prolapse.  Musculoskeletal:         General: No deformity. Normal range of motion.      Cervical back: Normal range of motion and neck supple.   Lymphadenopathy:      Upper Body:      Right upper body: No supraclavicular or axillary adenopathy.      Left upper body: No supraclavicular or axillary adenopathy.      Lower Body: No right inguinal adenopathy. No left inguinal adenopathy.   Skin:     General: Skin is warm and dry.   Neurological:      General: No focal deficit present.      Mental Status: She is alert and oriented to person, place, and time.   Psychiatric:         Mood and Affect: Mood normal.         Behavior: Behavior normal.            Assessment     1) GYN annual well woman exam.   2) Postmenopausal status   3) History of left breast cancer- lobular and ductal carcinoma s/p left mastectomy with reconstruction and right mastopexy   4) Cervical cancer screening  5) Left axilla lymph node prominent on imaging   6) Lichen sclerosus      Plan     1) Breast Health - Clinical breast exam & mammogram yearly, Self breast awareness monthly. Patient has history of left breast cancer s/p  left mastectomy with reconstruction and right mastopexy. Managed currently on Arimidex and followed by oncology. Plan was for mammogram this month of the right breast but this will be delayed until June give recent reconstructive surgery.   2) Pap - Collected pap smear for cervical cancer screening today.   3) Smoking status- former smoker.   4) Colon health - screening colonoscopy recommended if not up to date.   5) Bone health - Weight bearing exercise, dietary calcium recommendations and vitamin D reviewed. Reviewed normal bone density scan in 2024.   6) Activity recommends - Adult 150-300 min/week of multi-component physical activities that include balance training, aerobic and physical strengthening.    7) Lichen sclerosus- Patient is doing well with Clobetasol cream and A&D ointment to manage flare ups. Clinical exam consistent with lichen sclerosus today. Refilled Clobetasol cream.   8) Left axilla lymph node prominent on imaging- will obtain left axilla ultrasound to evaluate stability of the lymph node given that it is on the side that her breast cancer occurred.   8) Follow up prn and one year      Dorothy Dean MD

## 2025-03-12 NOTE — PROGRESS NOTES
Subjective  Patient ID: Colleen Yanes is a 64 y.o.. female seen in regularly scheduled group session.  Group participants have consented to group conducted in person    Total Group Time, Face to Face: 65 minutes  Total Group Participants: 5, plus facilitation per YONATHAN Vargas    Group Therapy  Group topics explored including development of new normal, interpersonal sensitivity, short and long term effects of diagnosis and treatment, significant other or family conflict, intimacy concerns, anticipitory anxiety, anxiety surrounding adjusted treatment plan or adjusted follow up, weight management, and lifestyle choices. Members shared interpersonal stressors, ongoing impact of cancer, engagement in enjoyable activities, care giving responsibilities, health complications, and persistent stressors.    Counseling provided regarding cognitive behavioral therapy (CBT) strategies, behavioral activation/ activity scheduling, reintroduction to activity through graded tasks, balancing avoidance with approach , discussion of need for restorative sleep with consideration of sleep apnea, recognition and allowance of need for rest, pharmacological and non pharmacological management of sleep disturbance, lifestyle counseling, exploration of quality of life goals, survivorship issues, anxiety/ mood management , and medication education. Supported group dynamics inclusive of universality, installation of hope, support of other members, accountability in group setting.  Reviewed sleep hygiene techniques, medication and nonmedication strategies for managing anxiety, acknowledgment of boundaries with permission to uphold them, exploration of personal goals to engage in physical activity.    Discussed current programming available in [facility] and community.    Benefits of group discussed while also acknowledging the need for 1:1 consultation at times. Members invited to discuss any concerns privately with me following group  "setting or to schedule a 1:1 visit if needs not being met in group.    Next shared medical visit: April 30 at 3:30    Patient response to group: Pt listens attentively and shares appropriately in group setting.    Medications Management  Pt continues in survivorship of breast cancer.    CC: Anxiety  HPI: Pt is seen in follow up regarding sx of anxiety. Remains adherent to Arimidex in survivorship of breast cancer, continuing to blame \"everything on it.\" Specifically mourns disrupted sleep, typically sleeping 3-4 hours, even when taking gabapentin. Shares forgetting last night and slept well without it. Hopeful for continued trend toward improved stabilization with ability to reduce med burden. Remains anxious at times, appreciating benefit to ativan on rare PRN basis.   Exam: As Above  Current medication regimen: venlafaxine  mg daily, gabapentin 300 mg tid, ativan 0.5 mg PRN anxiety  Lab Review:   Admission on 01/13/2025, Discharged on 01/13/2025   Component Date Value    Glucose 01/13/2025 145 (A)     Case Report 01/13/2025                      Value:Surgical Pathology Report                         Case: [id number]                                  Authorizing Provider:  Jaswinder Barbour MD   Collected:           01/13/2025 07:58 AM          Ordering Location:     [facility]     Received:            01/13/2025 10:55 AM                                 [facility]                                                                            OR                                                                           Pathologist:           Sayda Jasmine MD                                                        Specimens:   1) - Breast, Left, LEFT BREAST TISSUE                                                               2) - Breast, Right, RIGHT BREAST TISSUE                                                    Final Diagnosis 01/13/2025                      Value:1.   Skin of " "left breast, excision:         A.  Benign skin and subcutis.    2.  Right breast, mastopexy:         A.  Benign breast parenchyma and skin.      Gross Description 01/13/2025                      Value:1. Breast, Left.  Received in formalin labeled \"left breast tissue\" 7.5 x 0.3 cm strip of tan-white skin excised to a depth of 0.2 cm.  The specimen is submitted in toto as 1A.    Time collected-758  Cold ischemia time-2 minutes  Total formalin fixation time-13 hours    jap/uso/vas    2. Breast, Right.  Received in formalin labeled \"right breast tissue\" is a 100 g, 12 x 8 x 5 cm aggregate of fibrofatty tissue partially surfaced by tan-white skin.  Sectioning reveals the tissues comprised of 5% fibrous tissue.  Representative sections are submitted as 2A.    Time collected-904  Cold ischemia time-0 minutes  Total formalin fixation time-12 hours    jap/uso/vas        Microscopic Description 01/13/2025                      Value:Unless \"gross only\" is specified, the final diagnosis for each specimen is based on microscopic examination of tissue.      Glucose 01/13/2025 184 (A)      MDM:  Meds reviewed and renewed as appropriate.  Continue medications as written.  FU scheduled in group setting.    Diagnoses and all orders for this visit:    1. Generalized anxiety disorder  -     gabapentin (Neurontin) 600 MG tablet; Take 1 tablet by mouth Daily.  Dispense: 90 tablet; Refill: 0  -     venlafaxine XR (Effexor XR) 150 MG 24 hr capsule; Take 1 capsule by mouth Daily for 90 days.  Dispense: 90 capsule; Refill: 0    2. Major depressive disorder, recurrent episode, moderate  -     venlafaxine XR (Effexor XR) 150 MG 24 hr capsule; Take 1 capsule by mouth Daily for 90 days.  Dispense: 90 capsule; Refill: 0                    "

## 2025-03-14 ENCOUNTER — HOSPITAL ENCOUNTER (OUTPATIENT)
Dept: SLEEP MEDICINE | Facility: HOSPITAL | Age: 65
End: 2025-03-14
Payer: COMMERCIAL

## 2025-03-14 VITALS — HEIGHT: 64 IN | WEIGHT: 184.3 LBS | BODY MASS INDEX: 31.47 KG/M2

## 2025-03-14 DIAGNOSIS — G47.33 OSA (OBSTRUCTIVE SLEEP APNEA): ICD-10-CM

## 2025-03-14 PROCEDURE — 95811 POLYSOM 6/>YRS CPAP 4/> PARM: CPT

## 2025-03-19 ENCOUNTER — PATIENT MESSAGE (OUTPATIENT)
Dept: OBSTETRICS AND GYNECOLOGY | Facility: CLINIC | Age: 65
End: 2025-03-19
Payer: COMMERCIAL

## 2025-03-19 LAB
CONV .: NORMAL
CYTOLOGIST CVX/VAG CYTO: NORMAL
CYTOLOGY CVX/VAG DOC CYTO: NORMAL
CYTOLOGY CVX/VAG DOC THIN PREP: NORMAL
DX ICD CODE: NORMAL
OTHER STN SPEC: NORMAL
SERVICE CMNT-IMP: NORMAL
STAT OF ADQ CVX/VAG CYTO-IMP: NORMAL

## 2025-03-19 NOTE — TELEPHONE ENCOUNTER
Patient stated was in on 3/12/25 and was supposed to have left axilla US. If so, please place referral.  Thanks!

## 2025-03-21 DIAGNOSIS — F33.1 MAJOR DEPRESSIVE DISORDER, RECURRENT EPISODE, MODERATE: ICD-10-CM

## 2025-03-21 DIAGNOSIS — F41.1 GENERALIZED ANXIETY DISORDER: ICD-10-CM

## 2025-03-21 RX ORDER — VENLAFAXINE HYDROCHLORIDE 150 MG/1
150 CAPSULE, EXTENDED RELEASE ORAL DAILY
Qty: 90 CAPSULE | Refills: 0 | Status: SHIPPED | OUTPATIENT
Start: 2025-03-21

## 2025-03-27 ENCOUNTER — HOSPITAL ENCOUNTER (OUTPATIENT)
Dept: ULTRASOUND IMAGING | Facility: HOSPITAL | Age: 65
Discharge: HOME | End: 2025-03-27
Admitting: STUDENT IN AN ORGANIZED HEALTH CARE EDUCATION/TRAINING PROGRAM
Payer: COMMERCIAL

## 2025-03-27 DIAGNOSIS — Z85.3 HISTORY OF LEFT BREAST CANCER: ICD-10-CM

## 2025-03-27 DIAGNOSIS — R93.89 ABNORMAL FINDING ON CT SCAN: ICD-10-CM

## 2025-03-27 PROCEDURE — 76882 US LMTD JT/FCL EVL NVASC XTR: CPT

## 2025-04-01 DIAGNOSIS — G47.33 OSA (OBSTRUCTIVE SLEEP APNEA): Primary | ICD-10-CM

## 2025-04-04 ENCOUNTER — TELEPHONE (OUTPATIENT)
Dept: SLEEP MEDICINE | Facility: HOSPITAL | Age: 65
End: 2025-04-04
Payer: COMMERCIAL

## 2025-05-14 ENCOUNTER — OFFICE VISIT (OUTPATIENT)
Dept: ONCOLOGY | Facility: CLINIC | Age: 65
End: 2025-05-14
Payer: COMMERCIAL

## 2025-05-14 ENCOUNTER — LAB (OUTPATIENT)
Dept: LAB | Facility: HOSPITAL | Age: 65
End: 2025-05-14
Payer: COMMERCIAL

## 2025-05-14 VITALS
SYSTOLIC BLOOD PRESSURE: 106 MMHG | HEIGHT: 64 IN | WEIGHT: 186.6 LBS | OXYGEN SATURATION: 97 % | RESPIRATION RATE: 17 BRPM | HEART RATE: 79 BPM | DIASTOLIC BLOOD PRESSURE: 62 MMHG | BODY MASS INDEX: 31.86 KG/M2 | TEMPERATURE: 97.3 F

## 2025-05-14 DIAGNOSIS — C50.812 MALIGNANT NEOPLASM OF OVERLAPPING SITES OF LEFT BREAST IN FEMALE, ESTROGEN RECEPTOR POSITIVE: Primary | ICD-10-CM

## 2025-05-14 DIAGNOSIS — C50.812 MALIGNANT NEOPLASM OF OVERLAPPING SITES OF LEFT BREAST IN FEMALE, ESTROGEN RECEPTOR POSITIVE: ICD-10-CM

## 2025-05-14 DIAGNOSIS — Z17.0 MALIGNANT NEOPLASM OF OVERLAPPING SITES OF LEFT BREAST IN FEMALE, ESTROGEN RECEPTOR POSITIVE: Primary | ICD-10-CM

## 2025-05-14 DIAGNOSIS — F41.1 GENERALIZED ANXIETY DISORDER: ICD-10-CM

## 2025-05-14 DIAGNOSIS — Z17.0 MALIGNANT NEOPLASM OF OVERLAPPING SITES OF LEFT BREAST IN FEMALE, ESTROGEN RECEPTOR POSITIVE: ICD-10-CM

## 2025-05-14 LAB
ALBUMIN SERPL-MCNC: 4.7 G/DL (ref 3.5–5.2)
ALBUMIN/GLOB SERPL: 1.7 G/DL
ALP SERPL-CCNC: 120 U/L (ref 39–117)
ALT SERPL W P-5'-P-CCNC: 21 U/L (ref 1–33)
ANION GAP SERPL CALCULATED.3IONS-SCNC: 14 MMOL/L (ref 5–15)
AST SERPL-CCNC: 14 U/L (ref 1–32)
BASOPHILS # BLD AUTO: 0.05 10*3/MM3 (ref 0–0.2)
BASOPHILS NFR BLD AUTO: 0.6 % (ref 0–1.5)
BILIRUB SERPL-MCNC: 0.3 MG/DL (ref 0–1.2)
BUN SERPL-MCNC: 18 MG/DL (ref 8–23)
BUN/CREAT SERPL: 21.2 (ref 7–25)
CALCIUM SPEC-SCNC: 10.2 MG/DL (ref 8.6–10.5)
CHLORIDE SERPL-SCNC: 104 MMOL/L (ref 98–107)
CO2 SERPL-SCNC: 24 MMOL/L (ref 22–29)
CREAT SERPL-MCNC: 0.85 MG/DL (ref 0.57–1)
DEPRECATED RDW RBC AUTO: 43 FL (ref 37–54)
EGFRCR SERPLBLD CKD-EPI 2021: 76.6 ML/MIN/1.73
EOSINOPHIL # BLD AUTO: 0.13 10*3/MM3 (ref 0–0.4)
EOSINOPHIL NFR BLD AUTO: 1.5 % (ref 0.3–6.2)
ERYTHROCYTE [DISTWIDTH] IN BLOOD BY AUTOMATED COUNT: 13 % (ref 12.3–15.4)
GLOBULIN UR ELPH-MCNC: 2.7 GM/DL
GLUCOSE SERPL-MCNC: 198 MG/DL (ref 65–99)
HCT VFR BLD AUTO: 40.4 % (ref 34–46.6)
HGB BLD-MCNC: 12.1 G/DL (ref 12–15.9)
IMM GRANULOCYTES # BLD AUTO: 0.03 10*3/MM3 (ref 0–0.05)
IMM GRANULOCYTES NFR BLD AUTO: 0.3 % (ref 0–0.5)
LYMPHOCYTES # BLD AUTO: 3.1 10*3/MM3 (ref 0.7–3.1)
LYMPHOCYTES NFR BLD AUTO: 35.1 % (ref 19.6–45.3)
MCH RBC QN AUTO: 27.1 PG (ref 26.6–33)
MCHC RBC AUTO-ENTMCNC: 30 G/DL (ref 31.5–35.7)
MCV RBC AUTO: 90.6 FL (ref 79–97)
MONOCYTES # BLD AUTO: 0.57 10*3/MM3 (ref 0.1–0.9)
MONOCYTES NFR BLD AUTO: 6.5 % (ref 5–12)
NEUTROPHILS NFR BLD AUTO: 4.94 10*3/MM3 (ref 1.7–7)
NEUTROPHILS NFR BLD AUTO: 56 % (ref 42.7–76)
NRBC BLD AUTO-RTO: 0 /100 WBC (ref 0–0.2)
PLATELET # BLD AUTO: 276 10*3/MM3 (ref 140–450)
PMV BLD AUTO: 9.6 FL (ref 6–12)
POTASSIUM SERPL-SCNC: 4.2 MMOL/L (ref 3.5–5.2)
PROT SERPL-MCNC: 7.4 G/DL (ref 6–8.5)
RBC # BLD AUTO: 4.46 10*6/MM3 (ref 3.77–5.28)
SODIUM SERPL-SCNC: 142 MMOL/L (ref 136–145)
WBC NRBC COR # BLD AUTO: 8.82 10*3/MM3 (ref 3.4–10.8)

## 2025-05-14 PROCEDURE — 80053 COMPREHEN METABOLIC PANEL: CPT

## 2025-05-14 PROCEDURE — 85025 COMPLETE CBC W/AUTO DIFF WBC: CPT

## 2025-05-14 PROCEDURE — 36415 COLL VENOUS BLD VENIPUNCTURE: CPT

## 2025-05-14 RX ORDER — GABAPENTIN 600 MG/1
600 TABLET ORAL DAILY
Qty: 90 TABLET | Refills: 3 | Status: SHIPPED | OUTPATIENT
Start: 2025-05-14 | End: 2026-05-14

## 2025-05-14 NOTE — PROGRESS NOTES
Subjective     REASON FOR CONSULTATION: mpT 2 N0 left breast cancer-30 mm invasive lobular grade 2 ER/VT positive HER2 negative and invasive ductal carcinoma with 15 mm ER/VT positive HER2 negative post mastectomy and sentinel node biopsy  Provide an opinion on any further workup or treatment                             REQUESTING PHYSICIAN: MD Temo Acharya MD    History of Present Illness patient is a 63-year-old lady with 2 breast cancers the same breast treated with mastectomy and sentinel node biopsy both of them was hormone +1 ductal and lobular the initial 1 had an Oncotype of 15 and we did an Oncotype on the second tumor that came back at 21 and based on this we called and Arimidex which she has been on now for the last 4 months    She had a bone density which was normal    So far she is tolerating Arimidex well her hot flashes are improved and also she is taking gabapentin at bedtime which is helping her sleep.  She is taking 600 mg and wants us to prescribe it as she is no longer going to the supportive oncology group which makes her depressed    She has changed her  CPAP monitor and her fatigue is somewhat improved  Thankfully she did have her stress test done and she ended up having 3 stents in her coronaries and because of the aspirin and Plavix her reconstruction has been delayed    Mammogram is due again in March    On her CT angiogram we talked about the calcified granulomata and hilar nodes and we will do a follow-up CAT scan in December to see if there is any change    Past Medical History:   Diagnosis Date    Anxiety     BBB (bundle branch block) 04/25/2024    per EKG on right    Depression     Diabetes mellitus     Emotional disorder 04/2024    recent loss of brother and diagnosis of left breast cancer    Facial basal cell cancer     using cream    GERD (gastroesophageal reflux disease)     H/O Arrhythmia     Heart palpitations      Hemorrhoid     History of mononucleosis     Hyperlipidemia     Hypertension     Malignant neoplasm of left breast     Non-insulin dependent type 2 diabetes mellitus     Restless leg     Sleep apnea     cpap    Varicella         Past Surgical History:   Procedure Laterality Date    BREAST BIOPSY Left      AND     BREAST CYST ASPIRATION Left     BREAST RECONSTRUCTION Left 2024    Procedure: Left BREAST RECONSTRUCTION-GOLDILOCKS, TISSUE EXPANDER PLACEMENT WITH ACELLULAR DERMAL MATRIX (ADM);  Surgeon: Jaswinder Barbour MD;  Location: [facility] MAIN OR;  Service: Plastics;  Laterality: Left;    BREAST TISSUE EXPANDER REMOVAL INSERTION OF IMPLANT Left 2025    Procedure: LEFT-TISSUE EXPANDER TO BREAST IMPLANT EXCHANGE;  Surgeon: Jaswinder Barbour MD;  Location: [facility] MAIN OR;  Service: Plastics;  Laterality: Left;     SECTION      x2    COLONOSCOPY      Negative, Dr. 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 Amezcua and underwent bowel breast MRI with the findings of the 2 separate tumors in the left breast  with no axillary adenopathy or internal mammary adenopathy and a benign right breast    Patient was taken to surgery had a mastectomy and sentinel node biopsy the findings of 2 separate tumors at 30 mm lobular cancer 6 o'clock position and a 15 mm ductal carcinoma 6:00.  There is perineural invasion around the ductal carcinoma but all margins were clear anterior margin was 1.5 mm and 1 sentinel node was negative for metastatic disease    She has done well post mastectomy with an expander in place    Oncotype DX score lot done on the larger tumor was 15-second Oncotype has been ordered today and pending    She is  3 para 3 first childbirth was at age 27 she did not breast-feed  Menarche was at age 12 menopause at 53 but she was on birth control for 20 years and when she stopped her periods did not resume    Family history is positive for mother with breast cancer age 78 there is no other malignancy in the family significant coronary artery disease and heart disease    She is not currently a smoker but smoked for 20 years and quit at age 37 she has not had drinker  She has never had a DVT MI or stroke  She has not had a bone density in 10 years she is overdue for her colonoscopy  She has diabetes but no hypertension    Patient is tearful and crying and most of this is because her brother also  recently in the middle of her cancer diagnosis and she is not dealing with this well.  Her Effexor dose has been doubled from 75 to 154 days ago by our supportive oncology caregivers and hopefully this will help     Await second Oncotype score and, DEXA scan    Discussed in detail the rationale for adjuvant hormonal blockade especially if her second Oncotype is also below 25 which I suspect it  will be.  Discussed the prognostic and predictive value of the Oncotype DX score    The side effects and toxicities of the Aromatase inhibitors was discussed with the patient including, hot flashes, mood swings and hair  thinning.Significant arthralgias and worsening bone density were also discussed. Baseline bone density evaluation was ordered.    We will call her in 2 weeks with a second Oncotype score she will be presented at tumor board because she is concerned that she may need radiation because of the perineural invasion but also because of a close anterior margin though no ink on tumor      Current Outpatient Medications on File Prior to Visit   Medication Sig Dispense Refill    Accu-Chek Radha Plus test strip 1 each by Other route Daily.      anastrozole (Arimidex) 1 MG tablet Take 1 tablet by mouth Daily for 360 days. 90 tablet 3    aspirin 81 MG EC tablet Take 1 tablet by mouth Daily.      clobetasol propionate (TEMOVATE) 0.05 % cream Apply 1 Application topically to the appropriate area as directed 2 (Two) Times a Week. 60 g 6    gabapentin (Neurontin) 600 MG tablet Take 1 tablet by mouth Daily. 90 tablet 0    Jardiance 25 MG tablet tablet Take 1 tablet by mouth After Lunch.      LORazepam (Ativan) 0.5 MG tablet Take 1/2 to 1 tablet by mouth up to twice daily as needed for anxiety 60 tablet 0    losartan (COZAAR) 25 MG tablet Take 1 tablet by mouth After Lunch.  1    metFORMIN ER (GLUCOPHAGE-XR) 500 MG 24 hr tablet Take 2 tablets by mouth 2 (Two) Times a Day.  3    metoprolol succinate XL (TOPROL-XL) 25 MG 24 hr tablet Take 1 tablet by mouth After Lunch.  1    naproxen sodium (ALEVE) 220 MG tablet Take 3 tablets by mouth 2 (Two) Times a Day As Needed for Mild Pain, Fever or Headache.      pioglitazone (ACTOS) 15 MG tablet Take 1 tablet by mouth Daily.      pramipexole (MIRAPEX) 0.5 MG tablet Take 1 tablet by mouth Every Night.      prasugrel (EFFIENT) 10 MG tablet Take 1 tablet by mouth Daily.      rosuvastatin (CRESTOR) 40 MG tablet Take 1 tablet by mouth After Lunch.      Tirzepatide (MOUNJARO SC) Inject 10 mg under the skin into the appropriate area as directed Every 7 (Seven) Days.      venlafaxine XR (EFFEXOR-XR) 150  MG 24 hr capsule TAKE 1 CAPSULE BY MOUTH DAILY 90 capsule 0    vitamin D3 (vitamin d) 125 MCG (5000 UT) capsule capsule Take 1 capsule by mouth Daily.       No current facility-administered medications on file prior to visit.        ALLERGIES:    Allergies   Allergen Reactions    Bactrim [Sulfamethoxazole-Trimethoprim] Rash    Sulfa Antibiotics Itching and Rash    Lisinopril Cough    Simvastatin Myalgia     zocor    Ticagrelor Rash        Social History     Socioeconomic History    Marital status:      Spouse name: Edwin    Number of children: 3   Tobacco Use    Smoking status: Former     Current packs/day: 1.00     Types: Cigarettes     Passive exposure: Never    Smokeless tobacco: Never    Tobacco comments:     quit age 35   smoked about a pack a week  for  20 years quit 1996   Vaping Use    Vaping status: Never Used   Substance and Sexual Activity    Alcohol use: Yes     Comment: yearly 6    Drug use: Not Currently     Types: Marijuana, Cocaine(coke)     Comment: age 20    Sexual activity: Yes     Birth control/protection: Post-menopausal        Family History   Problem Relation Age of Onset    Diabetes Mother     Heart disease Mother     Breast cancer Mother     Hearing loss Mother     Coronary artery disease Father     Heart disease Father     Heart attack Father     Diabetes Sister     Heart disease Sister     Coronary artery disease Sister     Squamous cell carcinoma Sister     Heart disease Brother     Diabetes Brother     Coronary artery disease Brother     Coronary artery disease Brother     Heart disease Brother     Diabetes Brother     Hearing loss Sister     Hyperlipidemia Sister     Skin cancer Sister     Diabetes Sister     Diabetes Brother     Heart attack Brother     Diabetes Brother     Heart failure Brother         Passed 3/9/24    Heart failure Brother     Ovarian cancer Neg Hx     Uterine cancer Neg Hx     Colon cancer Neg Hx     Malig Hyperthermia Neg Hx         Review of Systems  "  Psychiatric/Behavioral:  Negative for dysphoric mood. The patient is not nervous/anxious.         Objective     Vitals:    05/14/25 1054   BP: 106/62   Pulse: 79   Resp: 17   Temp: 97.3 °F (36.3 °C)   TempSrc: Oral   SpO2: 97%   Weight: 84.6 kg (186 lb 9.6 oz)   Height: 162.6 cm (64.02\")   PainSc: 0-No pain         5/14/2025    10:53 AM   Current Status   ECOG score 0       Physical Exam   CONSTITUTIONAL:  Vital signs reviewed.  No distress, looks comfortable.  Tearful  EYES:  Conjunctivae and lids unremarkable.  PERRLA  EARS,NOSE,MOUTH,THROAT:  Ears and nose appear unremarkable.  Lips, teeth, gums appear unremarkable.  RESPIRATORY:  Normal respiratory effort.  Lungs clear to auscultation bilaterally.  BREASTS: Right breast benign left chest wall shows a expander in place  CARDIOVASCULAR:  Normal S1, S2.  No murmurs rubs or gallops.  No significant lower extremity edema.  GASTROINTESTINAL: Abdomen appears unremarkable.  Nontender.  No hepatomegaly.  No splenomegaly.  LYMPHATIC:  No cervical, supraclavicular, axillary lymphadenopathy.  SKIN:  Warm.  No rashes.  PSYCHIATRIC:  Normal judgment and insight.  Normal mood and affect.      RECENT LABS:  Hematology WBC   Date Value Ref Range Status   05/14/2025 8.82 3.40 - 10.80 10*3/mm3 Final   06/15/2022 9.07 4.5 - 11.0 10*3/uL Final     RBC   Date Value Ref Range Status   05/14/2025 4.46 3.77 - 5.28 10*6/mm3 Final   06/15/2022 5.00 4.0 - 5.2 10*6/uL Final     Hemoglobin   Date Value Ref Range Status   05/14/2025 12.1 12.0 - 15.9 g/dL Final   06/15/2022 13.7 12.0 - 16.0 g/dL Final     Hematocrit   Date Value Ref Range Status   05/14/2025 40.4 34.0 - 46.6 % Final   06/15/2022 43.8 36.0 - 46.0 % Final     Platelets   Date Value Ref Range Status   05/14/2025 276 140 - 450 10*3/mm3 Final   06/15/2022 311 140 - 440 10*3/uL Final          3/22/24  Final Diagnosis   1. Left Breast, 6:00, Core Needle Biopsy for a Mass with Calcifications:               A. INVASIVE DUCTAL " CARCINOMA, Moderately Differentiated; [place] Histologic Grade II/III       (tubule score = 3, nuclear score = 3, mitoses score = 1), measuring at least 7 mm.               B. Negative for in situ carcinoma in this sample.               C. Negative for lymphovascular space invasion.               D. Calcifications associated with invasive tumor.  E. See biomarker template #1 and comment.     2. Left Breast, 12:00, Core Needle Biopsy for a Mass with Calcifications:               A. INVASIVE LOBULAR CARCINOMA, Moderately Differentiated; Hubert Histologic Grade II/III       (tubule score = 3, nuclear score = 2, mitoses score = 1), measuring at least 5 mm.               B. Low-grade ductal carcinoma in situ (DCIS), solid and cribriform types with associated calcifications.                   C. Atypical lobular hyperplasia.               D. Negative for lymphovascular space invasion.               E. Calcifications also associated with invasive tumor.               F. See biomarker template #2 and comment.   Protocol posted: 12/13/2023BREAST BIOMARKER REPORTING TEMPLATE - 1  Test(s) Performed     Estrogen Receptor (ER) Status  Positive (greater than 10% of cells demonstrate nuclear positivity)   Percentage of Cells with Nuclear Positivity  %   Average Intensity of Staining  Strong   Test Type  Food and Drug Administration (FDA) cleared (test / vendor): [employer]umann   Primary Antibody  SP1   Scoring System  Yuidth   Proportion Score  5   Intensity Score  3   Total Yudith Score  8   Test(s) Performed     Progesterone Receptor (PgR) Status  Positive   Percentage of Cells with Nuclear Positivity  81-90%   Average Intensity of Staining  Moderate   Test Type  Food and Drug Administration (FDA) cleared (test / vendor): [employer]umann   Primary Antibody  1E2   Scoring System  Yudith   Proportion Score  5   Intensity Score  2   Total Yudith Score  7   Test(s) Performed     HER2 by Immunohistochemistry  Negative (Score 1+)   Test  Type  Food and Drug Administration (FDA) cleared (test / vendor): [employer]   Primary Antibody  4B5   Test(s) Performed  Ki-67   Ki-67 Percentage of Positive Nuclei  18 %     BREAST BIOMARKER REPORTING TEMPLATE - 2  Test(s) Performed     Estrogen Receptor (ER) Status  Positive (greater than 10% of cells demonstrate nuclear positivity)   Percentage of Cells with Nuclear Positivity  %   Average Intensity of Staining  Strong   Test Type  Food and Drug Administration (FDA) cleared (test / vendor): [employer]   Primary Antibody  SP1   Scoring System  Yudith   Proportion Score  5   Intensity Score  3   Total Yudith Score  8   Test(s) Performed     Progesterone Receptor (PgR) Status  Positive   Percentage of Cells with Nuclear Positivity  %   Average Intensity of Staining  Strong   Test Type  Food and Drug Administration (FDA) cleared (test / vendor): [employer]   Primary Antibody  1E2   Scoring System  Yudith   Proportion Score  5   Intensity Score  3   Total Yudith Score  8   Test(s) Performed     HER2 by Immunohistochemistry  Negative (Score 1+)   Test Type  Food and Drug Administration (FDA) cleared (test / vendor): [employer]   Primary Antibody  4B5   Test(s) Performed  Ki-67   Ki-67 Percentage of Positive Nuclei  8 %     4/30/24  Synoptic Checklist  INVASIVE CARCINOMA OF THE BREAST: Resection (INVASIVE CARCINOMA OF THE BREAST: RESECTION - All Specimens) 8th Edition  - Protocol posted: 12/13/2023  SPECIMEN  Procedure Total mastectomy  Specimen Laterality Left  .  TUMOR  Tumor Site Clock position  6 o'clock  12 o'clock  Histologic Type Invasive lobular carcinoma  Histologic Grade (Hubert Histologic Score)  Glandular (Acinar) / Tubular Differentiation Score 3  Nuclear Pleomorphism Score 2  Mitotic Rate Score 1  Overall Grade Grade 2 (scores of 6 or 7)  Tumor Size Greatest dimension of largest invasive focus (Millimeters): 30 mm  Tumor Focality Multiple foci of invasive carcinoma  Number of Foci 2  Sizes of  Individual Foci in Millimeters (mm) 30, 15  Ductal Carcinoma In Situ (DCIS) Not identified  Lobular Carcinoma In Situ (LCIS) Present  Lymphatic and / or Vascular Invasion Not identified  Dermal Lymphatic and / or Vascular Invasion Not identified  Microcalcifications Not identified  Treatment Effect in the Breast No known presurgical therapy  .  MARGINS  Margin Status for Invasive Carcinoma All margins negative for invasive carcinoma  Distance from Invasive Carcinoma to Closest Margin 1.5 mm  Closest Margin(s) to Invasive Carcinoma Anterior  Distance from Invasive Carcinoma to Anterior Margin 1.5 mm  Distance from Invasive Carcinoma to Posterior Margin 30 mm  Distance from Invasive Carcinoma to Superior Margin 50 mm  Distance from Invasive Carcinoma to Inferior Margin 40 mm  Distance from Invasive Carcinoma to Medial Margin 70 mm  Distance from Invasive Carcinoma to Lateral Margin  pT Category pT2  T Suffix (m)  pN Category pN0  N Suffix (sn)  .      Assessment & Plan   Mp (2)T2N0 grade 2 IDC and ILC left breast post mastectomy and SLNB ER/KY + Her2 neg clear margins  Oncotype on larger tumor 15 which is the lobular cancer  Oncotype on ductal carcinoma 21  Arimidex started 7/24  Tolerance of Arimidex fair as of 5/25    2.  Anxiety and depression-situational her regular dose of Effexor was doubled from 75 to 150-4 days ago    3.  Diabetes mellitus    4.  Hypertension hyperlipidemia on treatment    5.  Sleep apnea-new monitor may help with her sleep issues and fatigue    6, calcified granulomata and hilar nodes noted on CT angiogram in 6/24  Stable 12/24    Plan  1.  Continue Arimidex 1 mg daily  2.   Follow-up in 6  months has been scheduled   3.  Continue gabapentin 600 at bedtime which we will prescribe

## 2025-05-19 ENCOUNTER — TRANSCRIBE ORDERS (OUTPATIENT)
Dept: ADMINISTRATIVE | Facility: HOSPITAL | Age: 65
End: 2025-05-19
Payer: COMMERCIAL

## 2025-05-19 DIAGNOSIS — R59.0 HILAR LYMPHADENOPATHY: Primary | ICD-10-CM

## 2025-05-28 LAB — REF LAB TEST RESULTS: NORMAL

## 2025-06-04 ENCOUNTER — PROCEDURE VISIT (OUTPATIENT)
Dept: OBSTETRICS AND GYNECOLOGY | Facility: CLINIC | Age: 65
End: 2025-06-04
Payer: COMMERCIAL

## 2025-06-04 DIAGNOSIS — Z12.31 VISIT FOR SCREENING MAMMOGRAM: Primary | ICD-10-CM

## 2025-06-18 DIAGNOSIS — F33.1 MAJOR DEPRESSIVE DISORDER, RECURRENT EPISODE, MODERATE: ICD-10-CM

## 2025-06-18 DIAGNOSIS — F41.1 GENERALIZED ANXIETY DISORDER: ICD-10-CM

## 2025-06-18 RX ORDER — VENLAFAXINE HYDROCHLORIDE 150 MG/1
150 CAPSULE, EXTENDED RELEASE ORAL DAILY
Qty: 90 CAPSULE | Refills: 0 | Status: SHIPPED | OUTPATIENT
Start: 2025-06-18

## 2025-06-18 NOTE — TELEPHONE ENCOUNTER
Rogelio Lunsford,  Filling now, can you call to schedule? Group preferred, individual if desired by patient.  Thank you!  Carlotta

## 2025-06-19 ENCOUNTER — OFFICE (OUTPATIENT)
Dept: URBAN - METROPOLITAN AREA CLINIC 76 | Facility: CLINIC | Age: 65
End: 2025-06-19
Payer: COMMERCIAL

## 2025-06-19 VITALS
HEART RATE: 96 BPM | DIASTOLIC BLOOD PRESSURE: 72 MMHG | SYSTOLIC BLOOD PRESSURE: 114 MMHG | OXYGEN SATURATION: 96 % | HEIGHT: 64 IN | WEIGHT: 186 LBS

## 2025-06-19 DIAGNOSIS — C50.919 MALIGNANT NEOPLASM OF UNSPECIFIED SITE OF UNSPECIFIED FEMALE: ICD-10-CM

## 2025-06-19 DIAGNOSIS — K21.9 GASTRO-ESOPHAGEAL REFLUX DISEASE WITHOUT ESOPHAGITIS: ICD-10-CM

## 2025-06-19 DIAGNOSIS — K76.0 FATTY (CHANGE OF) LIVER, NOT ELSEWHERE CLASSIFIED: ICD-10-CM

## 2025-06-19 DIAGNOSIS — Z12.11 ENCOUNTER FOR SCREENING FOR MALIGNANT NEOPLASM OF COLON: ICD-10-CM

## 2025-06-19 PROCEDURE — 99213 OFFICE O/P EST LOW 20 MIN: CPT

## 2025-06-20 ENCOUNTER — HOSPITAL ENCOUNTER (OUTPATIENT)
Facility: HOSPITAL | Age: 65
Discharge: HOME | End: 2025-06-20
Admitting: STUDENT IN AN ORGANIZED HEALTH CARE EDUCATION/TRAINING PROGRAM
Payer: COMMERCIAL

## 2025-06-20 DIAGNOSIS — R59.0 HILAR LYMPHADENOPATHY: ICD-10-CM

## 2025-06-20 PROCEDURE — 71250 CT THORAX DX C-: CPT

## 2025-07-21 RX ORDER — ANASTROZOLE 1 MG/1
1 TABLET ORAL DAILY
Qty: 90 TABLET | Refills: 1 | Status: SHIPPED | OUTPATIENT
Start: 2025-07-21

## (undated) DEVICE — ADHS SKIN SURG TISS VISC PREMIERPRO EXOFIN HI/VISC 1ML

## (undated) DEVICE — PREP SOL POVIDONE/IODINE BT 4OZ

## (undated) DEVICE — BANDAGE,GAUZE,BULKEE II,4.5"X4.1YD,STRL: Brand: MEDLINE

## (undated) DEVICE — GLV SURG BIOGEL M LTX PF 6 1/2

## (undated) DEVICE — SYS CLS SKIN PREMIERPRO EXOFINFUSION 22CM

## (undated) DEVICE — 4-PORT MANIFOLD: Brand: NEPTUNE 2

## (undated) DEVICE — SPONGE,LAP,18"X18",DLX,XR,ST,5/PK,40/PK: Brand: MEDLINE

## (undated) DEVICE — PATIENT RETURN ELECTRODE, SINGLE-USE, CONTACT QUALITY MONITORING, ADULT, WITH 9FT CORD, FOR PATIENTS WEIGING OVER 33LBS. (15KG): Brand: MEGADYNE

## (undated) DEVICE — BLANKT WARM LOWR/BDY 100X120CM

## (undated) DEVICE — TOWEL,OR,DSP,ST,BLUE,STD,4/PK,20PK/CS: Brand: MEDLINE

## (undated) DEVICE — SYR LUERLOK 20CC BX/50

## (undated) DEVICE — STPLR SKIN VISISTAT WD 35CT

## (undated) DEVICE — INTENDED FOR TISSUE SEPARATION, AND OTHER PROCEDURES THAT REQUIRE A SHARP SURGICAL BLADE TO PUNCTURE OR CUT.: Brand: BARD-PARKER ® CARBON RIB-BACK BLADES

## (undated) DEVICE — LOU MINOR PROCEDURE: Brand: MEDLINE INDUSTRIES, INC.

## (undated) DEVICE — STPCK 3WY D201 DISCOFIX

## (undated) DEVICE — ELECTRD BLD EZ CLN MOD 2.5IN

## (undated) DEVICE — BNDG,ELSTC,MATRIX,STRL,6"X5YD,LF,HOOK&LP: Brand: MEDLINE

## (undated) DEVICE — SUT PDS 3/0 SH 27IN DYED Z316H

## (undated) DEVICE — GLV SURG BIOGEL LTX PF 7

## (undated) DEVICE — NATRELLE TE SMOOTH 133S-MV-14-T (US)
Type: IMPLANTABLE DEVICE | Site: BREAST | Status: NON-FUNCTIONAL
Brand: NATRELLE 133S TISSUE EXPANDERS

## (undated) DEVICE — Device

## (undated) DEVICE — SMOKE EVACUATION TUBING WITH 7/8 IN TO 1/4 IN REDUCER: Brand: BUFFALO FILTER

## (undated) DEVICE — PROXIMATE RH ROTATING HEAD SKIN STAPLERS (35 WIDE) CONTAINS 35 STAINLESS STEEL STAPLES: Brand: PROXIMATE

## (undated) DEVICE — GLV SURG SENSICARE PI PF LF 7 GRN STRL

## (undated) DEVICE — KT DRP SPY/PHI W/ICG 25MG STRL

## (undated) DEVICE — TOTAL TRAY, 16FR 10ML SIL FOLEY, URN: Brand: MEDLINE

## (undated) DEVICE — DRSNG SURESITE WNDW 4X4.5

## (undated) DEVICE — DRP SLUSH WARMR MACH CIR 44X44IN

## (undated) DEVICE — JACKSON-PRATT 100CC BULB RESERVOIR: Brand: CARDINAL HEALTH

## (undated) DEVICE — NDL BLNT 18G 1 1/2IN

## (undated) DEVICE — HDRST POSTN SLOTTED A/

## (undated) DEVICE — DEV DEL BRST/IMP GEL KELLER2 FUNNEL EA/5

## (undated) DEVICE — SPNG LAP 18X18IN LF STRL PK/5

## (undated) DEVICE — LEGGINGS, PAIR, 31X48, STERILE: Brand: MEDLINE

## (undated) DEVICE — GLV SURG SENSICARE PI MIC PF SZ6 LF STRL

## (undated) DEVICE — DECANTER BAG 9": Brand: MEDLINE INDUSTRIES, INC.

## (undated) DEVICE — UNDRPD BREATH 23X36 BG/10

## (undated) DEVICE — ANTIBACTERIAL UNDYED BRAIDED (POLYGLACTIN 910), SYNTHETIC ABSORBABLE SUTURE: Brand: COATED VICRYL

## (undated) DEVICE — PK UNIV COMPL 40

## (undated) DEVICE — GLV SURG SENSICARE PI MIC PF SZ6.5 LF STRL

## (undated) DEVICE — NDL HYPO PRECISIONGLIDE REG 25G 1 1/2

## (undated) DEVICE — STRIP CLS WND SUTURESTRIP/PLS 0.5X4IN TP1103

## (undated) DEVICE — ELECTRD BLD EZ CLN MOD XLNG 2.75IN

## (undated) DEVICE — GLV SURG SENSICARE PI MIC PF SZ7 LF STRL

## (undated) DEVICE — SYR LUERLOK 30CC

## (undated) DEVICE — GLV SURG SENSICARE PI LF PF 7.5 GRN STRL

## (undated) DEVICE — MEDI-VAC YANKAUER SUCTION HANDLE W/BULBOUS TIP: Brand: CARDINAL HEALTH

## (undated) DEVICE — TBG PENCL TELESCP MEGADYNE SMOKE EVAC 10FT

## (undated) DEVICE — CONN TBG Y 5 IN 1 LF STRL

## (undated) DEVICE — SUT MNCRYL 4/0 P3 18IN Y494G

## (undated) DEVICE — PAD,ABDOMINAL,8"X10",ST,LF: Brand: MEDLINE

## (undated) DEVICE — GOWN,NON-REINFORCED,SIRUS,SET IN SLV,XL: Brand: MEDLINE

## (undated) DEVICE — STRIP CLS WND SUTURESTRIP/PLS 0.5X5IN TP1105

## (undated) DEVICE — CVR TRANSD CIV FLX TPR 11.9 TO 3.8X61CM

## (undated) DEVICE — EXTRCT STPL SKIN STRL BX/12

## (undated) DEVICE — DRAPE,TOWEL,LARGE,INVISISHIELD: Brand: MEDLINE

## (undated) DEVICE — SUT MNCRYL PLS ANTIB UD 4/0 PS2 18IN

## (undated) DEVICE — SUT MNCRYL 3/0 PS2 18IN MCP497G

## (undated) DEVICE — GLV SURG SENSICARE POLYISPRN W/ALOE PF LF 6.5 GRN STRL

## (undated) DEVICE — 1LYRTR 16FR10ML 100%SILI SNAP: Brand: MEDLINE INDUSTRIES, INC.

## (undated) DEVICE — SUT ETHLN 4/0 PS2 PLSTC 1667G

## (undated) DEVICE — SYR LL TP 10ML STRL

## (undated) DEVICE — SOL NACL 0.9PCT 100ML SGL

## (undated) DEVICE — NDL HYPO PRECISIONGLIDE/REG 18G 11/2 PNK

## (undated) DEVICE — APPL CHLORAPREP HI/LITE 26ML ORNG

## (undated) DEVICE — SYR LL W/SCALE/MARK 3ML STRL

## (undated) DEVICE — SUT SILK 2/0 FS BLK 18IN 685G

## (undated) DEVICE — BIOPATCH™ ANTIMICROBIAL DRESSING WITH CHLORHEXIDINE GLUCONATE IS A HYDROPHILLIC POLYURETHANE ABSORPTIVE FOAM WITH CHLORHEXIDINE GLUCONATE (CHG) WHICH INHIBITS BACTERIAL GROWTH UNDER THE DRESSING. THE DRESSING IS INTENDED TO BE USED TO ABSORB EXUDATE, COVER A WOUND CAUSED BY VASCULAR AND NONVASCULAR PERCUTANEOUS MEDICAL DEVICES DURING SURGERY, AS WELL AS REDUCE LOCAL INFECTION AND COLONIZATION OF MICROORGANISMS.: Brand: BIOPATCH

## (undated) DEVICE — TRAP FLD MINIVAC MEGADYNE 100ML

## (undated) DEVICE — CONTAINER,SPECIMEN,OR STERILE,4OZ: Brand: MEDLINE

## (undated) DEVICE — SYR LUERLOK 5CC

## (undated) DEVICE — IRRIGATOR BULB ASEPTO 60CC STRL

## (undated) DEVICE — THE STERILE LIGHT HANDLE COVER IS USED WITH STERIS SURGICAL LIGHTING AND VISUALIZATION SYSTEMS.

## (undated) DEVICE — BRECKENRIDGE UNIVERSAL COMPLET: Brand: MEDLINE INDUSTRIES, INC.